# Patient Record
Sex: FEMALE | Race: WHITE | NOT HISPANIC OR LATINO | Employment: OTHER | URBAN - NONMETROPOLITAN AREA
[De-identification: names, ages, dates, MRNs, and addresses within clinical notes are randomized per-mention and may not be internally consistent; named-entity substitution may affect disease eponyms.]

---

## 2017-01-03 ENCOUNTER — APPOINTMENT (OUTPATIENT)
Dept: CT IMAGING | Facility: HOSPITAL | Age: 77
End: 2017-01-03

## 2017-01-03 ENCOUNTER — HOSPITAL ENCOUNTER (EMERGENCY)
Facility: HOSPITAL | Age: 77
Discharge: HOME | End: 2017-01-03
Attending: EMERGENCY MEDICINE | Admitting: EMERGENCY MEDICINE

## 2017-01-03 VITALS
DIASTOLIC BLOOD PRESSURE: 72 MMHG | HEIGHT: 68 IN | OXYGEN SATURATION: 96 % | HEART RATE: 72 BPM | RESPIRATION RATE: 16 BRPM | WEIGHT: 135 LBS | BODY MASS INDEX: 20.46 KG/M2 | SYSTOLIC BLOOD PRESSURE: 138 MMHG | TEMPERATURE: 97.6 F

## 2017-01-03 DIAGNOSIS — R30.0 DYSURIA: Primary | ICD-10-CM

## 2017-01-03 LAB
ALBUMIN SERPL-MCNC: 4.3 G/DL (ref 3.4–4.8)
ALBUMIN/GLOB SERPL: 1.2 G/DL (ref 1.5–2.5)
ALP SERPL-CCNC: 94 U/L (ref 46–116)
ALT SERPL W P-5'-P-CCNC: 6 U/L (ref 10–36)
ANION GAP SERPL CALCULATED.3IONS-SCNC: 8 MMOL/L (ref 3.6–11.2)
AST SERPL-CCNC: 23 U/L (ref 10–30)
BASOPHILS # BLD AUTO: 0.05 10*3/MM3 (ref 0–0.3)
BASOPHILS NFR BLD AUTO: 0.7 % (ref 0–2)
BILIRUB SERPL-MCNC: 0.3 MG/DL (ref 0.2–1.8)
BILIRUB UR QL STRIP: NEGATIVE
BUN BLD-MCNC: 13 MG/DL (ref 7–21)
BUN/CREAT SERPL: 20.3 (ref 7–25)
CALCIUM SPEC-SCNC: 9.6 MG/DL (ref 7.7–10)
CHLORIDE SERPL-SCNC: 97 MMOL/L (ref 99–112)
CLARITY UR: CLEAR
CO2 SERPL-SCNC: 27 MMOL/L (ref 24.3–31.9)
COLOR UR: YELLOW
CREAT BLD-MCNC: 0.64 MG/DL (ref 0.43–1.29)
DEPRECATED RDW RBC AUTO: 40.8 FL (ref 37–54)
EOSINOPHIL # BLD AUTO: 0.05 10*3/MM3 (ref 0–0.7)
EOSINOPHIL NFR BLD AUTO: 0.7 % (ref 0–7)
ERYTHROCYTE [DISTWIDTH] IN BLOOD BY AUTOMATED COUNT: 13.2 % (ref 11.5–14.5)
GFR SERPL CREATININE-BSD FRML MDRD: 90 ML/MIN/1.73
GLOBULIN UR ELPH-MCNC: 3.5 GM/DL
GLUCOSE BLD-MCNC: 95 MG/DL (ref 70–110)
GLUCOSE UR STRIP-MCNC: NEGATIVE MG/DL
HCT VFR BLD AUTO: 38 % (ref 37–47)
HGB BLD-MCNC: 12.4 G/DL (ref 12–16)
HGB UR QL STRIP.AUTO: NEGATIVE
IMM GRANULOCYTES # BLD: 0.01 10*3/MM3 (ref 0–0.03)
IMM GRANULOCYTES NFR BLD: 0.1 % (ref 0–0.5)
KETONES UR QL STRIP: NEGATIVE
LEUKOCYTE ESTERASE UR QL STRIP.AUTO: NEGATIVE
LYMPHOCYTES # BLD AUTO: 1.72 10*3/MM3 (ref 1–3)
LYMPHOCYTES NFR BLD AUTO: 25.5 % (ref 16–46)
MCH RBC QN AUTO: 28 PG (ref 27–33)
MCHC RBC AUTO-ENTMCNC: 32.6 G/DL (ref 33–37)
MCV RBC AUTO: 85.8 FL (ref 80–94)
MONOCYTES # BLD AUTO: 0.76 10*3/MM3 (ref 0.1–0.9)
MONOCYTES NFR BLD AUTO: 11.3 % (ref 0–12)
NEUTROPHILS # BLD AUTO: 4.15 10*3/MM3 (ref 1.4–6.5)
NEUTROPHILS NFR BLD AUTO: 61.7 % (ref 40–75)
NITRITE UR QL STRIP: NEGATIVE
OSMOLALITY SERPL CALC.SUM OF ELEC: 264.4 MOSM/KG (ref 273–305)
PH UR STRIP.AUTO: 7 [PH] (ref 5–8)
PLATELET # BLD AUTO: 211 10*3/MM3 (ref 130–400)
PMV BLD AUTO: 9.4 FL (ref 6–10)
POTASSIUM BLD-SCNC: 4.4 MMOL/L (ref 3.5–5.3)
PROT SERPL-MCNC: 7.8 G/DL (ref 6–8)
PROT UR QL STRIP: NEGATIVE
RBC # BLD AUTO: 4.43 10*6/MM3 (ref 4.2–5.4)
SODIUM BLD-SCNC: 132 MMOL/L (ref 135–153)
SP GR UR STRIP: 1.01 (ref 1–1.03)
UROBILINOGEN UR QL STRIP: NORMAL
WBC NRBC COR # BLD: 6.74 10*3/MM3 (ref 4.5–12.5)

## 2017-01-03 PROCEDURE — 81003 URINALYSIS AUTO W/O SCOPE: CPT | Performed by: PHYSICIAN ASSISTANT

## 2017-01-03 PROCEDURE — 80053 COMPREHEN METABOLIC PANEL: CPT | Performed by: PHYSICIAN ASSISTANT

## 2017-01-03 PROCEDURE — 74177 CT ABD & PELVIS W/CONTRAST: CPT

## 2017-01-03 PROCEDURE — 85025 COMPLETE CBC W/AUTO DIFF WBC: CPT | Performed by: PHYSICIAN ASSISTANT

## 2017-01-03 PROCEDURE — 99283 EMERGENCY DEPT VISIT LOW MDM: CPT

## 2017-01-03 PROCEDURE — 74177 CT ABD & PELVIS W/CONTRAST: CPT | Performed by: RADIOLOGY

## 2017-01-03 PROCEDURE — 0 IOPAMIDOL 61 % SOLUTION: Performed by: EMERGENCY MEDICINE

## 2017-01-03 RX ORDER — SODIUM CHLORIDE 0.9 % (FLUSH) 0.9 %
10 SYRINGE (ML) INJECTION AS NEEDED
Status: DISCONTINUED | OUTPATIENT
Start: 2017-01-03 | End: 2017-01-03 | Stop reason: HOSPADM

## 2017-01-03 RX ORDER — PHENAZOPYRIDINE HYDROCHLORIDE 200 MG/1
200 TABLET, FILM COATED ORAL 3 TIMES DAILY PRN
Qty: 6 TABLET | Refills: 0 | Status: SHIPPED | OUTPATIENT
Start: 2017-01-03 | End: 2019-09-21

## 2017-01-03 RX ORDER — PHENAZOPYRIDINE HYDROCHLORIDE 200 MG/1
200 TABLET, FILM COATED ORAL ONCE
Status: COMPLETED | OUTPATIENT
Start: 2017-01-03 | End: 2017-01-03

## 2017-01-03 RX ADMIN — IOPAMIDOL 100 ML: 612 INJECTION, SOLUTION INTRAVENOUS at 18:49

## 2017-01-03 RX ADMIN — PHENAZOPYRIDINE 200 MG: 200 TABLET ORAL at 19:48

## 2017-01-03 NOTE — ED PROVIDER NOTES
Subjective   Patient is a 76 y.o. female presenting with difficulty urinating.   Female Dysuria   Pain quality:  Burning  Pain severity:  Moderate  Onset quality:  Gradual  Duration:  10 days  Timing:  Intermittent  Chronicity:  New  Recent urinary tract infections: yes    Relieved by:  Nothing  Worsened by:  Nothing  Ineffective treatments:  Antibiotics  Urinary symptoms: no frequent urination and no hematuria    Associated symptoms: abdominal pain (burning in her lower abdomen earlier today.  This has resolved.)    Associated symptoms: no fever, no flank pain, no nausea and no vomiting    Risk factors: no hx of pyelonephritis, no hx of urolithiasis and no kidney transplant        Review of Systems   Constitutional: Negative.  Negative for fever.   HENT: Negative.    Respiratory: Negative.    Cardiovascular: Negative.  Negative for chest pain.   Gastrointestinal: Positive for abdominal pain (burning in her lower abdomen earlier today.  This has resolved.). Negative for nausea and vomiting.   Endocrine: Negative.    Genitourinary: Positive for difficulty urinating. Negative for dysuria and flank pain.   Skin: Negative.    Neurological: Negative.    Psychiatric/Behavioral: Negative.    All other systems reviewed and are negative.      Past Medical History   Diagnosis Date   • Disease of thyroid gland        Allergies   Allergen Reactions   • Penicillins    • Quinolones        History reviewed. No pertinent past surgical history.    History reviewed. No pertinent family history.    Social History     Social History   • Marital status:      Spouse name: N/A   • Number of children: N/A   • Years of education: N/A     Social History Main Topics   • Smoking status: Current Every Day Smoker     Types: Cigarettes   • Smokeless tobacco: None   • Alcohol use None   • Drug use: None   • Sexual activity: Not Asked     Other Topics Concern   • None     Social History Narrative   • None           Objective   Physical Exam    Constitutional: She is oriented to person, place, and time. She appears well-developed and well-nourished. No distress.   HENT:   Head: Normocephalic and atraumatic.   Right Ear: External ear normal.   Left Ear: External ear normal.   Nose: Nose normal.   Eyes: Conjunctivae and EOM are normal. Pupils are equal, round, and reactive to light.   Neck: Normal range of motion. Neck supple. No JVD present. No tracheal deviation present.   Cardiovascular: Normal rate, regular rhythm and normal heart sounds.    No murmur heard.  Pulmonary/Chest: Effort normal and breath sounds normal. No respiratory distress. She has no wheezes.   Abdominal: Soft. Bowel sounds are normal. There is no tenderness.   Musculoskeletal: Normal range of motion. She exhibits no edema or deformity.   Neurological: She is alert and oriented to person, place, and time. No cranial nerve deficit.   Skin: Skin is warm and dry. No rash noted. She is not diaphoretic. No erythema. No pallor.   Psychiatric: She has a normal mood and affect. Her behavior is normal. Thought content normal.   Nursing note and vitals reviewed.      Procedures         ED Course  ED Course                  MDM  Number of Diagnoses or Management Options  Dysuria: new and requires workup     Amount and/or Complexity of Data Reviewed  Clinical lab tests: ordered and reviewed  Tests in the radiology section of CPT®: ordered and reviewed  Discuss the patient with other providers: yes  Independent visualization of images, tracings, or specimens: yes    Risk of Complications, Morbidity, and/or Mortality  Presenting problems: moderate        Final diagnoses:   Dysuria            MAURA Velasco  01/03/17 1931

## 2017-01-04 NOTE — DISCHARGE INSTRUCTIONS

## 2019-07-16 ENCOUNTER — TRANSCRIBE ORDERS (OUTPATIENT)
Dept: ADMINISTRATIVE | Facility: HOSPITAL | Age: 79
End: 2019-07-16

## 2019-07-16 DIAGNOSIS — G89.29 CHRONIC NONINTRACTABLE HEADACHE, UNSPECIFIED HEADACHE TYPE: Primary | ICD-10-CM

## 2019-07-16 DIAGNOSIS — R51.9 CHRONIC NONINTRACTABLE HEADACHE, UNSPECIFIED HEADACHE TYPE: Primary | ICD-10-CM

## 2019-07-26 ENCOUNTER — HOSPITAL ENCOUNTER (OUTPATIENT)
Dept: MRI IMAGING | Facility: HOSPITAL | Age: 79
Discharge: HOME | End: 2019-07-26
Admitting: FAMILY MEDICINE

## 2019-07-26 DIAGNOSIS — R51.9 CHRONIC NONINTRACTABLE HEADACHE, UNSPECIFIED HEADACHE TYPE: ICD-10-CM

## 2019-07-26 DIAGNOSIS — G89.29 CHRONIC NONINTRACTABLE HEADACHE, UNSPECIFIED HEADACHE TYPE: ICD-10-CM

## 2019-07-26 PROCEDURE — 70551 MRI BRAIN STEM W/O DYE: CPT

## 2019-07-26 PROCEDURE — 70551 MRI BRAIN STEM W/O DYE: CPT | Performed by: RADIOLOGY

## 2019-09-21 ENCOUNTER — APPOINTMENT (OUTPATIENT)
Dept: GENERAL RADIOLOGY | Facility: HOSPITAL | Age: 79
End: 2019-09-21

## 2019-09-21 ENCOUNTER — APPOINTMENT (OUTPATIENT)
Dept: CT IMAGING | Facility: HOSPITAL | Age: 79
End: 2019-09-21

## 2019-09-21 ENCOUNTER — HOSPITAL ENCOUNTER (INPATIENT)
Facility: HOSPITAL | Age: 79
LOS: 5 days | Discharge: HOME | End: 2019-09-26
Attending: FAMILY MEDICINE | Admitting: INTERNAL MEDICINE

## 2019-09-21 DIAGNOSIS — E87.1 HYPONATREMIA: Primary | ICD-10-CM

## 2019-09-21 DIAGNOSIS — I16.0 HYPERTENSIVE URGENCY: ICD-10-CM

## 2019-09-21 LAB
ALBUMIN SERPL-MCNC: 4.3 G/DL (ref 3.5–5.2)
ALBUMIN/GLOB SERPL: 1.3 G/DL
ALP SERPL-CCNC: 90 U/L (ref 39–117)
ALT SERPL W P-5'-P-CCNC: 7 U/L (ref 1–33)
ANION GAP SERPL CALCULATED.3IONS-SCNC: 9.5 MMOL/L (ref 5–15)
AST SERPL-CCNC: 20 U/L (ref 1–32)
BASOPHILS # BLD AUTO: 0.03 10*3/MM3 (ref 0–0.2)
BASOPHILS NFR BLD AUTO: 0.8 % (ref 0–1.5)
BILIRUB SERPL-MCNC: 0.2 MG/DL (ref 0.2–1.2)
BILIRUB UR QL STRIP: NEGATIVE
BUN BLD-MCNC: 15 MG/DL (ref 8–23)
BUN/CREAT SERPL: 20.8 (ref 7–25)
CALCIUM SPEC-SCNC: 9.5 MG/DL (ref 8.6–10.5)
CHLORIDE SERPL-SCNC: 81 MMOL/L (ref 98–107)
CHLORIDE UR-SCNC: 42 MMOL/L
CK SERPL-CCNC: 74 U/L (ref 20–180)
CLARITY UR: CLEAR
CO2 SERPL-SCNC: 26.5 MMOL/L (ref 22–29)
COLOR UR: YELLOW
CREAT BLD-MCNC: 0.72 MG/DL (ref 0.57–1)
CRP SERPL-MCNC: 0.22 MG/DL (ref 0–0.5)
DEPRECATED RDW RBC AUTO: 41.9 FL (ref 37–54)
EOSINOPHIL # BLD AUTO: 0.03 10*3/MM3 (ref 0–0.4)
EOSINOPHIL NFR BLD AUTO: 0.8 % (ref 0.3–6.2)
ERYTHROCYTE [DISTWIDTH] IN BLOOD BY AUTOMATED COUNT: 14.9 % (ref 12.3–15.4)
GFR SERPL CREATININE-BSD FRML MDRD: 78 ML/MIN/1.73
GLOBULIN UR ELPH-MCNC: 3.2 GM/DL
GLUCOSE BLD-MCNC: 107 MG/DL (ref 65–99)
GLUCOSE UR STRIP-MCNC: NEGATIVE MG/DL
HCT VFR BLD AUTO: 30.4 % (ref 34–46.6)
HGB BLD-MCNC: 9.8 G/DL (ref 12–15.9)
HGB UR QL STRIP.AUTO: NEGATIVE
IMM GRANULOCYTES # BLD AUTO: 0 10*3/MM3 (ref 0–0.05)
IMM GRANULOCYTES NFR BLD AUTO: 0 % (ref 0–0.5)
KETONES UR QL STRIP: NEGATIVE
LEUKOCYTE ESTERASE UR QL STRIP.AUTO: NEGATIVE
LYMPHOCYTES # BLD AUTO: 1.2 10*3/MM3 (ref 0.7–3.1)
LYMPHOCYTES NFR BLD AUTO: 30.2 % (ref 19.6–45.3)
MAGNESIUM SERPL-MCNC: 1.7 MG/DL (ref 1.6–2.4)
MCH RBC QN AUTO: 25.3 PG (ref 26.6–33)
MCHC RBC AUTO-ENTMCNC: 32.2 G/DL (ref 31.5–35.7)
MCV RBC AUTO: 78.6 FL (ref 79–97)
MONOCYTES # BLD AUTO: 0.46 10*3/MM3 (ref 0.1–0.9)
MONOCYTES NFR BLD AUTO: 11.6 % (ref 5–12)
NEUTROPHILS # BLD AUTO: 2.26 10*3/MM3 (ref 1.7–7)
NEUTROPHILS NFR BLD AUTO: 56.6 % (ref 42.7–76)
NITRITE UR QL STRIP: NEGATIVE
NT-PROBNP SERPL-MCNC: 999.7 PG/ML (ref 5–1800)
PH UR STRIP.AUTO: 7.5 [PH] (ref 5–8)
PLATELET # BLD AUTO: 232 10*3/MM3 (ref 140–450)
PMV BLD AUTO: 8.4 FL (ref 6–12)
POTASSIUM BLD-SCNC: 4.6 MMOL/L (ref 3.5–5.2)
PROT SERPL-MCNC: 7.5 G/DL (ref 6–8.5)
PROT UR QL STRIP: NEGATIVE
RBC # BLD AUTO: 3.87 10*6/MM3 (ref 3.77–5.28)
SODIUM BLD-SCNC: 117 MMOL/L (ref 136–145)
SODIUM UR-SCNC: 58 MMOL/L
SP GR UR STRIP: 1.01 (ref 1–1.03)
T4 FREE SERPL-MCNC: 1.68 NG/DL (ref 0.93–1.7)
TROPONIN T SERPL-MCNC: <0.01 NG/ML (ref 0–0.03)
TROPONIN T SERPL-MCNC: <0.01 NG/ML (ref 0–0.03)
TSH SERPL DL<=0.05 MIU/L-ACNC: 0.19 UIU/ML (ref 0.27–4.2)
UROBILINOGEN UR QL STRIP: NORMAL
WBC NRBC COR # BLD: 3.98 10*3/MM3 (ref 3.4–10.8)

## 2019-09-21 PROCEDURE — 84481 FREE ASSAY (FT-3): CPT | Performed by: HOSPITALIST

## 2019-09-21 PROCEDURE — 84484 ASSAY OF TROPONIN QUANT: CPT | Performed by: FAMILY MEDICINE

## 2019-09-21 PROCEDURE — 83880 ASSAY OF NATRIURETIC PEPTIDE: CPT | Performed by: FAMILY MEDICINE

## 2019-09-21 PROCEDURE — 82550 ASSAY OF CK (CPK): CPT | Performed by: FAMILY MEDICINE

## 2019-09-21 PROCEDURE — 93005 ELECTROCARDIOGRAM TRACING: CPT | Performed by: FAMILY MEDICINE

## 2019-09-21 PROCEDURE — 84439 ASSAY OF FREE THYROXINE: CPT | Performed by: HOSPITALIST

## 2019-09-21 PROCEDURE — 84300 ASSAY OF URINE SODIUM: CPT | Performed by: HOSPITALIST

## 2019-09-21 PROCEDURE — 99284 EMERGENCY DEPT VISIT MOD MDM: CPT

## 2019-09-21 PROCEDURE — 83735 ASSAY OF MAGNESIUM: CPT | Performed by: FAMILY MEDICINE

## 2019-09-21 PROCEDURE — 25010000002 INFLUENZA VAC SUBUNIT QUAD 0.5 ML SUSPENSION PREFILLED SYRINGE: Performed by: HOSPITALIST

## 2019-09-21 PROCEDURE — 82436 ASSAY OF URINE CHLORIDE: CPT | Performed by: HOSPITALIST

## 2019-09-21 PROCEDURE — 90674 CCIIV4 VAC NO PRSV 0.5 ML IM: CPT | Performed by: HOSPITALIST

## 2019-09-21 PROCEDURE — 84443 ASSAY THYROID STIM HORMONE: CPT | Performed by: FAMILY MEDICINE

## 2019-09-21 PROCEDURE — 71045 X-RAY EXAM CHEST 1 VIEW: CPT

## 2019-09-21 PROCEDURE — 82570 ASSAY OF URINE CREATININE: CPT | Performed by: INTERNAL MEDICINE

## 2019-09-21 PROCEDURE — 70450 CT HEAD/BRAIN W/O DYE: CPT

## 2019-09-21 PROCEDURE — G0008 ADMIN INFLUENZA VIRUS VAC: HCPCS | Performed by: HOSPITALIST

## 2019-09-21 PROCEDURE — 80053 COMPREHEN METABOLIC PANEL: CPT | Performed by: FAMILY MEDICINE

## 2019-09-21 PROCEDURE — 25010000002 HYDRALAZINE PER 20 MG

## 2019-09-21 PROCEDURE — 25010000002 HEPARIN (PORCINE) PER 1000 UNITS: Performed by: INTERNAL MEDICINE

## 2019-09-21 PROCEDURE — 99223 1ST HOSP IP/OBS HIGH 75: CPT | Performed by: HOSPITALIST

## 2019-09-21 PROCEDURE — 81003 URINALYSIS AUTO W/O SCOPE: CPT | Performed by: HOSPITALIST

## 2019-09-21 PROCEDURE — 85025 COMPLETE CBC W/AUTO DIFF WBC: CPT | Performed by: FAMILY MEDICINE

## 2019-09-21 PROCEDURE — 84484 ASSAY OF TROPONIN QUANT: CPT | Performed by: INTERNAL MEDICINE

## 2019-09-21 PROCEDURE — 86140 C-REACTIVE PROTEIN: CPT | Performed by: FAMILY MEDICINE

## 2019-09-21 RX ORDER — HEPARIN SODIUM 5000 [USP'U]/ML
5000 INJECTION, SOLUTION INTRAVENOUS; SUBCUTANEOUS EVERY 8 HOURS SCHEDULED
Status: DISCONTINUED | OUTPATIENT
Start: 2019-09-21 | End: 2019-09-26 | Stop reason: HOSPADM

## 2019-09-21 RX ORDER — POTASSIUM CHLORIDE 1.5 G/1.77G
40 POWDER, FOR SOLUTION ORAL AS NEEDED
Status: DISCONTINUED | OUTPATIENT
Start: 2019-09-21 | End: 2019-09-26 | Stop reason: HOSPADM

## 2019-09-21 RX ORDER — HYDRALAZINE HYDROCHLORIDE 20 MG/ML
INJECTION INTRAMUSCULAR; INTRAVENOUS
Status: COMPLETED
Start: 2019-09-21 | End: 2019-09-21

## 2019-09-21 RX ORDER — MAGNESIUM SULFATE HEPTAHYDRATE 40 MG/ML
2 INJECTION, SOLUTION INTRAVENOUS AS NEEDED
Status: DISCONTINUED | OUTPATIENT
Start: 2019-09-21 | End: 2019-09-26 | Stop reason: HOSPADM

## 2019-09-21 RX ORDER — MAGNESIUM SULFATE HEPTAHYDRATE 40 MG/ML
4 INJECTION, SOLUTION INTRAVENOUS AS NEEDED
Status: DISCONTINUED | OUTPATIENT
Start: 2019-09-21 | End: 2019-09-26 | Stop reason: HOSPADM

## 2019-09-21 RX ORDER — PHENAZOPYRIDINE HYDROCHLORIDE 100 MG/1
100 TABLET, FILM COATED ORAL 3 TIMES DAILY
Status: CANCELLED | OUTPATIENT
Start: 2019-09-21

## 2019-09-21 RX ORDER — LEVOTHYROXINE SODIUM 88 UG/1
88 TABLET ORAL DAILY
COMMUNITY
End: 2019-09-21

## 2019-09-21 RX ORDER — METOPROLOL SUCCINATE 25 MG/1
25 TABLET, EXTENDED RELEASE ORAL DAILY
COMMUNITY
End: 2022-08-12 | Stop reason: HOSPADM

## 2019-09-21 RX ORDER — POTASSIUM CHLORIDE 7.45 MG/ML
10 INJECTION INTRAVENOUS
Status: DISCONTINUED | OUTPATIENT
Start: 2019-09-21 | End: 2019-09-26 | Stop reason: HOSPADM

## 2019-09-21 RX ORDER — ASPIRIN 81 MG/1
81 TABLET ORAL DAILY
COMMUNITY
End: 2022-06-17

## 2019-09-21 RX ORDER — HYDROCODONE BITARTRATE AND ACETAMINOPHEN 10; 325 MG/1; MG/1
1 TABLET ORAL EVERY 8 HOURS PRN
COMMUNITY

## 2019-09-21 RX ORDER — SODIUM CHLORIDE 0.9 % (FLUSH) 0.9 %
10 SYRINGE (ML) INJECTION AS NEEDED
Status: DISCONTINUED | OUTPATIENT
Start: 2019-09-21 | End: 2019-09-26 | Stop reason: HOSPADM

## 2019-09-21 RX ORDER — LABETALOL HYDROCHLORIDE 5 MG/ML
10 INJECTION, SOLUTION INTRAVENOUS ONCE
Status: COMPLETED | OUTPATIENT
Start: 2019-09-21 | End: 2019-09-21

## 2019-09-21 RX ORDER — HYDRALAZINE HYDROCHLORIDE 20 MG/ML
20 INJECTION INTRAMUSCULAR; INTRAVENOUS ONCE
Status: COMPLETED | OUTPATIENT
Start: 2019-09-21 | End: 2019-09-21

## 2019-09-21 RX ORDER — ERGOCALCIFEROL (VITAMIN D2) 10 MCG
400 TABLET ORAL DAILY
COMMUNITY
End: 2022-06-17

## 2019-09-21 RX ORDER — AMLODIPINE BESYLATE 5 MG/1
5 TABLET ORAL DAILY
Status: ON HOLD | COMMUNITY
End: 2019-09-26 | Stop reason: SDUPTHER

## 2019-09-21 RX ORDER — LEVOTHYROXINE SODIUM 88 UG/1
88 TABLET ORAL DAILY
COMMUNITY
End: 2019-09-26 | Stop reason: HOSPADM

## 2019-09-21 RX ORDER — MAGNESIUM SULFATE HEPTAHYDRATE 40 MG/ML
4 INJECTION, SOLUTION INTRAVENOUS ONCE
Status: COMPLETED | OUTPATIENT
Start: 2019-09-21 | End: 2019-09-22

## 2019-09-21 RX ORDER — SODIUM CHLORIDE 0.9 % (FLUSH) 0.9 %
10 SYRINGE (ML) INJECTION EVERY 12 HOURS SCHEDULED
Status: DISCONTINUED | OUTPATIENT
Start: 2019-09-21 | End: 2019-09-26 | Stop reason: HOSPADM

## 2019-09-21 RX ORDER — LISINOPRIL AND HYDROCHLOROTHIAZIDE 20; 12.5 MG/1; MG/1
1 TABLET ORAL DAILY
COMMUNITY
End: 2019-09-26 | Stop reason: HOSPADM

## 2019-09-21 RX ORDER — HYDRALAZINE HYDROCHLORIDE 20 MG/ML
10 INJECTION INTRAMUSCULAR; INTRAVENOUS EVERY 6 HOURS PRN
Status: DISCONTINUED | OUTPATIENT
Start: 2019-09-21 | End: 2019-09-26 | Stop reason: HOSPADM

## 2019-09-21 RX ORDER — ISOSORBIDE DINITRATE 30 MG/1
30 TABLET ORAL 2 TIMES DAILY
COMMUNITY
End: 2023-01-11 | Stop reason: SDUPTHER

## 2019-09-21 RX ORDER — SODIUM CHLORIDE 9 MG/ML
75 INJECTION, SOLUTION INTRAVENOUS CONTINUOUS
Status: DISCONTINUED | OUTPATIENT
Start: 2019-09-21 | End: 2019-09-22

## 2019-09-21 RX ORDER — POTASSIUM CHLORIDE 750 MG/1
40 CAPSULE, EXTENDED RELEASE ORAL AS NEEDED
Status: DISCONTINUED | OUTPATIENT
Start: 2019-09-21 | End: 2019-09-26 | Stop reason: HOSPADM

## 2019-09-21 RX ORDER — ALPRAZOLAM 1 MG/1
0.5 TABLET ORAL NIGHTLY
COMMUNITY

## 2019-09-21 RX ADMIN — LABETALOL HYDROCHLORIDE 10 MG: 5 INJECTION INTRAVENOUS at 17:20

## 2019-09-21 RX ADMIN — HYDRALAZINE HYDROCHLORIDE 20 MG: 20 INJECTION INTRAMUSCULAR; INTRAVENOUS at 19:23

## 2019-09-21 RX ADMIN — INFLUENZA A VIRUS A/SINGAPORE/GP1908/2015 IVR-180 (H1N1) ANTIGEN (MDCK CELL DERIVED, PROPIOLACTONE INACTIVATED), INFLUENZA A VIRUS A/NORTH CAROLINA/04/2016 (H3N2) HEMAGGLUTININ ANTIGEN (MDCK CELL DERIVED, PROPIOLACTONE INACTIVATED), INFLUENZA B VIRUS B/IOWA/06/2017 HEMAGGLUTININ ANTIGEN (MDCK CELL DERIVED, PROPIOLACTONE INACTIVATED), INFLUENZA B VIRUS B/SINGAPORE/INFTT-16-0610/2016 HEMAGGLUTININ ANTIGEN (MDCK CELL DERIVED, PROPIOLACTONE INACTIVATED) 0.5 ML: 15; 15; 15; 15 INJECTION, SUSPENSION INTRAMUSCULAR at 21:38

## 2019-09-21 RX ADMIN — MAGNESIUM SULFATE HEPTAHYDRATE 4 G: 40 INJECTION, SOLUTION INTRAVENOUS at 21:28

## 2019-09-21 RX ADMIN — SODIUM CHLORIDE, PRESERVATIVE FREE 10 ML: 5 INJECTION INTRAVENOUS at 21:28

## 2019-09-21 RX ADMIN — HEPARIN SODIUM 5000 UNITS: 5000 INJECTION INTRAVENOUS; SUBCUTANEOUS at 21:38

## 2019-09-21 RX ADMIN — SODIUM CHLORIDE 75 ML/HR: 9 INJECTION, SOLUTION INTRAVENOUS at 21:28

## 2019-09-22 ENCOUNTER — APPOINTMENT (OUTPATIENT)
Dept: ULTRASOUND IMAGING | Facility: HOSPITAL | Age: 79
End: 2019-09-22

## 2019-09-22 ENCOUNTER — APPOINTMENT (OUTPATIENT)
Dept: CARDIOLOGY | Facility: HOSPITAL | Age: 79
End: 2019-09-22

## 2019-09-22 ENCOUNTER — APPOINTMENT (OUTPATIENT)
Dept: CT IMAGING | Facility: HOSPITAL | Age: 79
End: 2019-09-22

## 2019-09-22 LAB
25(OH)D3 SERPL-MCNC: 37.5 NG/ML (ref 30–100)
ALBUMIN SERPL-MCNC: 4 G/DL (ref 3.5–5.2)
ALBUMIN/GLOB SERPL: 1.2 G/DL
ALP SERPL-CCNC: 86 U/L (ref 39–117)
ALT SERPL W P-5'-P-CCNC: 7 U/L (ref 1–33)
ANION GAP SERPL CALCULATED.3IONS-SCNC: 12.9 MMOL/L (ref 5–15)
ANION GAP SERPL CALCULATED.3IONS-SCNC: 14.5 MMOL/L (ref 5–15)
ANION GAP SERPL CALCULATED.3IONS-SCNC: 16 MMOL/L (ref 5–15)
ANION GAP SERPL CALCULATED.3IONS-SCNC: 17 MMOL/L (ref 5–15)
AST SERPL-CCNC: 21 U/L (ref 1–32)
BASOPHILS # BLD AUTO: 0.02 10*3/MM3 (ref 0–0.2)
BASOPHILS NFR BLD AUTO: 0.3 % (ref 0–1.5)
BH CV ECHO MEAS - ACS: 1.2 CM
BH CV ECHO MEAS - AO MAX PG: 2.9 MMHG
BH CV ECHO MEAS - AO MEAN PG: 1.6 MMHG
BH CV ECHO MEAS - AO ROOT AREA (BSA CORRECTED): 2
BH CV ECHO MEAS - AO ROOT AREA: 9.4 CM^2
BH CV ECHO MEAS - AO ROOT DIAM: 3.5 CM
BH CV ECHO MEAS - AO V2 MAX: 85.5 CM/SEC
BH CV ECHO MEAS - AO V2 MEAN: 59.4 CM/SEC
BH CV ECHO MEAS - AO V2 VTI: 19.3 CM
BH CV ECHO MEAS - BSA(HAYCOCK): 1.7 M^2
BH CV ECHO MEAS - BSA: 1.7 M^2
BH CV ECHO MEAS - BZI_BMI: 19.8 KILOGRAMS/M^2
BH CV ECHO MEAS - BZI_METRIC_HEIGHT: 172.7 CM
BH CV ECHO MEAS - BZI_METRIC_WEIGHT: 59 KG
BH CV ECHO MEAS - EDV(CUBED): 28.9 ML
BH CV ECHO MEAS - EDV(MOD-SP4): 28 ML
BH CV ECHO MEAS - EDV(TEICH): 37 ML
BH CV ECHO MEAS - EF(CUBED): 77.7 %
BH CV ECHO MEAS - EF(MOD-SP4): 64.3 %
BH CV ECHO MEAS - EF(TEICH): 71.4 %
BH CV ECHO MEAS - ESV(CUBED): 6.5 ML
BH CV ECHO MEAS - ESV(MOD-SP4): 10 ML
BH CV ECHO MEAS - ESV(TEICH): 10.6 ML
BH CV ECHO MEAS - FS: 39.4 %
BH CV ECHO MEAS - IVS/LVPW: 1.1
BH CV ECHO MEAS - IVSD: 1.5 CM
BH CV ECHO MEAS - LA DIMENSION: 2.6 CM
BH CV ECHO MEAS - LA/AO: 0.74
BH CV ECHO MEAS - LV DIASTOLIC VOL/BSA (35-75): 16.5 ML/M^2
BH CV ECHO MEAS - LV MASS(C)D: 148.1 GRAMS
BH CV ECHO MEAS - LV MASS(C)DI: 87 GRAMS/M^2
BH CV ECHO MEAS - LV SYSTOLIC VOL/BSA (12-30): 5.9 ML/M^2
BH CV ECHO MEAS - LVIDD: 3.1 CM
BH CV ECHO MEAS - LVIDS: 1.9 CM
BH CV ECHO MEAS - LVLD AP4: 5.5 CM
BH CV ECHO MEAS - LVLS AP4: 4.8 CM
BH CV ECHO MEAS - LVOT AREA (M): 3.1 CM^2
BH CV ECHO MEAS - LVOT AREA: 3.1 CM^2
BH CV ECHO MEAS - LVOT DIAM: 2 CM
BH CV ECHO MEAS - LVPWD: 1.4 CM
BH CV ECHO MEAS - MV A MAX VEL: 83.9 CM/SEC
BH CV ECHO MEAS - MV E MAX VEL: 38.5 CM/SEC
BH CV ECHO MEAS - MV E/A: 0.46
BH CV ECHO MEAS - PA ACC SLOPE: 1068 CM/SEC^2
BH CV ECHO MEAS - PA ACC TIME: 0.09 SEC
BH CV ECHO MEAS - PA PR(ACCEL): 37.8 MMHG
BH CV ECHO MEAS - RAP SYSTOLE: 10 MMHG
BH CV ECHO MEAS - RVSP: 32.3 MMHG
BH CV ECHO MEAS - SI(AO): 106.1 ML/M^2
BH CV ECHO MEAS - SI(CUBED): 13.2 ML/M^2
BH CV ECHO MEAS - SI(MOD-SP4): 10.6 ML/M^2
BH CV ECHO MEAS - SI(TEICH): 15.5 ML/M^2
BH CV ECHO MEAS - SV(AO): 180.7 ML
BH CV ECHO MEAS - SV(CUBED): 22.5 ML
BH CV ECHO MEAS - SV(MOD-SP4): 18 ML
BH CV ECHO MEAS - SV(TEICH): 26.4 ML
BH CV ECHO MEAS - TR MAX VEL: 236.2 CM/SEC
BILIRUB SERPL-MCNC: 0.3 MG/DL (ref 0.2–1.2)
BUN BLD-MCNC: 10 MG/DL (ref 8–23)
BUN BLD-MCNC: 12 MG/DL (ref 8–23)
BUN BLD-MCNC: 15 MG/DL (ref 8–23)
BUN BLD-MCNC: 15 MG/DL (ref 8–23)
BUN/CREAT SERPL: 17.9 (ref 7–25)
BUN/CREAT SERPL: 19.7 (ref 7–25)
BUN/CREAT SERPL: 22.4 (ref 7–25)
BUN/CREAT SERPL: 22.7 (ref 7–25)
CALCIUM SPEC-SCNC: 8.8 MG/DL (ref 8.6–10.5)
CALCIUM SPEC-SCNC: 9 MG/DL (ref 8.6–10.5)
CALCIUM SPEC-SCNC: 9.1 MG/DL (ref 8.6–10.5)
CALCIUM SPEC-SCNC: 9.2 MG/DL (ref 8.6–10.5)
CHLORIDE SERPL-SCNC: 80 MMOL/L (ref 98–107)
CHLORIDE SERPL-SCNC: 81 MMOL/L (ref 98–107)
CHLORIDE SERPL-SCNC: 83 MMOL/L (ref 98–107)
CHLORIDE SERPL-SCNC: 83 MMOL/L (ref 98–107)
CO2 SERPL-SCNC: 20 MMOL/L (ref 22–29)
CO2 SERPL-SCNC: 21 MMOL/L (ref 22–29)
CO2 SERPL-SCNC: 22.1 MMOL/L (ref 22–29)
CO2 SERPL-SCNC: 22.5 MMOL/L (ref 22–29)
CORTIS SERPL-MCNC: 17.54 MCG/DL
CREAT BLD-MCNC: 0.56 MG/DL (ref 0.57–1)
CREAT BLD-MCNC: 0.61 MG/DL (ref 0.57–1)
CREAT BLD-MCNC: 0.66 MG/DL (ref 0.57–1)
CREAT BLD-MCNC: 0.67 MG/DL (ref 0.57–1)
CREAT UR-MCNC: 14.3 MG/DL
DEPRECATED RDW RBC AUTO: 41.6 FL (ref 37–54)
EOSINOPHIL # BLD AUTO: 0.02 10*3/MM3 (ref 0–0.4)
EOSINOPHIL NFR BLD AUTO: 0.3 % (ref 0.3–6.2)
ERYTHROCYTE [DISTWIDTH] IN BLOOD BY AUTOMATED COUNT: 14.9 % (ref 12.3–15.4)
FERRITIN SERPL-MCNC: 16.71 NG/ML (ref 13–150)
FOLATE SERPL-MCNC: 11 NG/ML (ref 4.78–24.2)
GFR SERPL CREATININE-BSD FRML MDRD: 104 ML/MIN/1.73
GFR SERPL CREATININE-BSD FRML MDRD: 85 ML/MIN/1.73
GFR SERPL CREATININE-BSD FRML MDRD: 86 ML/MIN/1.73
GFR SERPL CREATININE-BSD FRML MDRD: 94 ML/MIN/1.73
GLOBULIN UR ELPH-MCNC: 3.3 GM/DL
GLUCOSE BLD-MCNC: 116 MG/DL (ref 65–99)
GLUCOSE BLD-MCNC: 129 MG/DL (ref 65–99)
GLUCOSE BLD-MCNC: 130 MG/DL (ref 65–99)
GLUCOSE BLD-MCNC: 138 MG/DL (ref 65–99)
HCT VFR BLD AUTO: 30.9 % (ref 34–46.6)
HGB BLD-MCNC: 10 G/DL (ref 12–15.9)
IMM GRANULOCYTES # BLD AUTO: 0.02 10*3/MM3 (ref 0–0.05)
IMM GRANULOCYTES NFR BLD AUTO: 0.3 % (ref 0–0.5)
IRON 24H UR-MRATE: 32 MCG/DL (ref 37–145)
IRON SATN MFR SERPL: 9 % (ref 20–50)
LYMPHOCYTES # BLD AUTO: 1.43 10*3/MM3 (ref 0.7–3.1)
LYMPHOCYTES NFR BLD AUTO: 22.2 % (ref 19.6–45.3)
MAGNESIUM SERPL-MCNC: 3.1 MG/DL (ref 1.6–2.4)
MAXIMAL PREDICTED HEART RATE: 140 BPM
MCH RBC QN AUTO: 25.2 PG (ref 26.6–33)
MCHC RBC AUTO-ENTMCNC: 32.4 G/DL (ref 31.5–35.7)
MCV RBC AUTO: 77.8 FL (ref 79–97)
MONOCYTES # BLD AUTO: 0.66 10*3/MM3 (ref 0.1–0.9)
MONOCYTES NFR BLD AUTO: 10.3 % (ref 5–12)
NEUTROPHILS # BLD AUTO: 4.28 10*3/MM3 (ref 1.7–7)
NEUTROPHILS NFR BLD AUTO: 66.6 % (ref 42.7–76)
PHOSPHATE SERPL-MCNC: 3 MG/DL (ref 2.5–4.5)
PLATELET # BLD AUTO: 253 10*3/MM3 (ref 140–450)
PMV BLD AUTO: 8.7 FL (ref 6–12)
POTASSIUM BLD-SCNC: 4.1 MMOL/L (ref 3.5–5.2)
POTASSIUM BLD-SCNC: 4.2 MMOL/L (ref 3.5–5.2)
POTASSIUM BLD-SCNC: 4.2 MMOL/L (ref 3.5–5.2)
PROT SERPL-MCNC: 7.3 G/DL (ref 6–8.5)
RBC # BLD AUTO: 3.97 10*6/MM3 (ref 3.77–5.28)
SODIUM BLD-SCNC: 116 MMOL/L (ref 136–145)
SODIUM BLD-SCNC: 118 MMOL/L (ref 136–145)
SODIUM BLD-SCNC: 119 MMOL/L (ref 136–145)
SODIUM BLD-SCNC: 120 MMOL/L (ref 136–145)
STRESS TARGET HR: 119 BPM
T3FREE SERPL-MCNC: 1.96 PG/ML (ref 2–4.4)
TIBC SERPL-MCNC: 361 MCG/DL (ref 298–536)
TRANSFERRIN SERPL-MCNC: 242 MG/DL (ref 200–360)
TROPONIN T SERPL-MCNC: <0.01 NG/ML (ref 0–0.03)
TROPONIN T SERPL-MCNC: <0.01 NG/ML (ref 0–0.03)
TSH SERPL DL<=0.05 MIU/L-ACNC: 0.13 UIU/ML (ref 0.27–4.2)
VIT B12 BLD-MCNC: 226 PG/ML (ref 211–946)
WBC NRBC COR # BLD: 6.43 10*3/MM3 (ref 3.4–10.8)

## 2019-09-22 PROCEDURE — 76536 US EXAM OF HEAD AND NECK: CPT

## 2019-09-22 PROCEDURE — 84443 ASSAY THYROID STIM HORMONE: CPT | Performed by: INTERNAL MEDICINE

## 2019-09-22 PROCEDURE — 82533 TOTAL CORTISOL: CPT | Performed by: INTERNAL MEDICINE

## 2019-09-22 PROCEDURE — 82607 VITAMIN B-12: CPT | Performed by: INTERNAL MEDICINE

## 2019-09-22 PROCEDURE — 85025 COMPLETE CBC W/AUTO DIFF WBC: CPT | Performed by: INTERNAL MEDICINE

## 2019-09-22 PROCEDURE — 71260 CT THORAX DX C+: CPT | Performed by: RADIOLOGY

## 2019-09-22 PROCEDURE — 84484 ASSAY OF TROPONIN QUANT: CPT | Performed by: INTERNAL MEDICINE

## 2019-09-22 PROCEDURE — 80053 COMPREHEN METABOLIC PANEL: CPT | Performed by: INTERNAL MEDICINE

## 2019-09-22 PROCEDURE — 25010000002 HEPARIN (PORCINE) PER 1000 UNITS: Performed by: INTERNAL MEDICINE

## 2019-09-22 PROCEDURE — 93306 TTE W/DOPPLER COMPLETE: CPT

## 2019-09-22 PROCEDURE — 84132 ASSAY OF SERUM POTASSIUM: CPT | Performed by: INTERNAL MEDICINE

## 2019-09-22 PROCEDURE — 93880 EXTRACRANIAL BILAT STUDY: CPT

## 2019-09-22 PROCEDURE — 82746 ASSAY OF FOLIC ACID SERUM: CPT | Performed by: INTERNAL MEDICINE

## 2019-09-22 PROCEDURE — 71260 CT THORAX DX C+: CPT

## 2019-09-22 PROCEDURE — 93880 EXTRACRANIAL BILAT STUDY: CPT | Performed by: RADIOLOGY

## 2019-09-22 PROCEDURE — 82728 ASSAY OF FERRITIN: CPT | Performed by: INTERNAL MEDICINE

## 2019-09-22 PROCEDURE — 84466 ASSAY OF TRANSFERRIN: CPT | Performed by: INTERNAL MEDICINE

## 2019-09-22 PROCEDURE — 83540 ASSAY OF IRON: CPT | Performed by: INTERNAL MEDICINE

## 2019-09-22 PROCEDURE — 94799 UNLISTED PULMONARY SVC/PX: CPT

## 2019-09-22 PROCEDURE — 99233 SBSQ HOSP IP/OBS HIGH 50: CPT | Performed by: INTERNAL MEDICINE

## 2019-09-22 PROCEDURE — 76536 US EXAM OF HEAD AND NECK: CPT | Performed by: RADIOLOGY

## 2019-09-22 PROCEDURE — 82306 VITAMIN D 25 HYDROXY: CPT | Performed by: INTERNAL MEDICINE

## 2019-09-22 PROCEDURE — 84100 ASSAY OF PHOSPHORUS: CPT | Performed by: INTERNAL MEDICINE

## 2019-09-22 PROCEDURE — 0 IOVERSOL 68 % SOLUTION: Performed by: INTERNAL MEDICINE

## 2019-09-22 PROCEDURE — 83735 ASSAY OF MAGNESIUM: CPT | Performed by: INTERNAL MEDICINE

## 2019-09-22 RX ORDER — AMLODIPINE BESYLATE 5 MG/1
5 TABLET ORAL DAILY
Status: DISCONTINUED | OUTPATIENT
Start: 2019-09-22 | End: 2019-09-26 | Stop reason: HOSPADM

## 2019-09-22 RX ORDER — OMEGA-3S/DHA/EPA/FISH OIL/D3 300MG-1000
400 CAPSULE ORAL DAILY
Status: DISCONTINUED | OUTPATIENT
Start: 2019-09-22 | End: 2019-09-26 | Stop reason: HOSPADM

## 2019-09-22 RX ORDER — LEVOTHYROXINE SODIUM 0.07 MG/1
75 TABLET ORAL DAILY
Status: DISCONTINUED | OUTPATIENT
Start: 2019-09-22 | End: 2019-09-26 | Stop reason: HOSPADM

## 2019-09-22 RX ORDER — 3% SODIUM CHLORIDE 3 G/100ML
25 INJECTION, SOLUTION INTRAVENOUS CONTINUOUS
Status: DISCONTINUED | OUTPATIENT
Start: 2019-09-22 | End: 2019-09-23

## 2019-09-22 RX ORDER — ASPIRIN 81 MG/1
81 TABLET ORAL DAILY
Status: DISCONTINUED | OUTPATIENT
Start: 2019-09-22 | End: 2019-09-26 | Stop reason: HOSPADM

## 2019-09-22 RX ORDER — ACETAMINOPHEN 325 MG/1
650 TABLET ORAL EVERY 6 HOURS PRN
Status: DISCONTINUED | OUTPATIENT
Start: 2019-09-22 | End: 2019-09-26 | Stop reason: HOSPADM

## 2019-09-22 RX ORDER — METOPROLOL SUCCINATE 25 MG/1
25 TABLET, EXTENDED RELEASE ORAL DAILY
Status: DISCONTINUED | OUTPATIENT
Start: 2019-09-22 | End: 2019-09-26 | Stop reason: HOSPADM

## 2019-09-22 RX ORDER — DEXTROSE MONOHYDRATE 50 MG/ML
6 INJECTION, SOLUTION INTRAVENOUS CONTINUOUS PRN
Status: DISCONTINUED | OUTPATIENT
Start: 2019-09-22 | End: 2019-09-26 | Stop reason: HOSPADM

## 2019-09-22 RX ORDER — ALPRAZOLAM 1 MG/1
1 TABLET ORAL NIGHTLY PRN
Status: DISCONTINUED | OUTPATIENT
Start: 2019-09-22 | End: 2019-09-26 | Stop reason: HOSPADM

## 2019-09-22 RX ORDER — FERROUS SULFATE 325(65) MG
325 TABLET ORAL 2 TIMES DAILY WITH MEALS
Status: DISCONTINUED | OUTPATIENT
Start: 2019-09-22 | End: 2019-09-26 | Stop reason: HOSPADM

## 2019-09-22 RX ORDER — 3% SODIUM CHLORIDE 3 G/100ML
25 INJECTION, SOLUTION INTRAVENOUS CONTINUOUS
Status: DISPENSED | OUTPATIENT
Start: 2019-09-22 | End: 2019-09-22

## 2019-09-22 RX ORDER — HYDROCODONE BITARTRATE AND ACETAMINOPHEN 10; 325 MG/1; MG/1
1 TABLET ORAL EVERY 8 HOURS PRN
Status: DISCONTINUED | OUTPATIENT
Start: 2019-09-22 | End: 2019-09-26 | Stop reason: HOSPADM

## 2019-09-22 RX ADMIN — CHOLECALCIFEROL TAB 10 MCG (400 UNIT) 400 UNITS: 10 TAB at 14:02

## 2019-09-22 RX ADMIN — SODIUM CHLORIDE, PRESERVATIVE FREE 10 ML: 5 INJECTION INTRAVENOUS at 20:22

## 2019-09-22 RX ADMIN — ISOSORBIDE DINITRATE 30 MG: 20 TABLET ORAL at 14:04

## 2019-09-22 RX ADMIN — IOVERSOL 100 ML: 678 INJECTION INTRA-ARTERIAL; INTRAVENOUS at 17:30

## 2019-09-22 RX ADMIN — SODIUM CHLORIDE 25 ML/HR: 3 INJECTION, SOLUTION INTRAVENOUS at 20:20

## 2019-09-22 RX ADMIN — HEPARIN SODIUM 5000 UNITS: 5000 INJECTION INTRAVENOUS; SUBCUTANEOUS at 14:03

## 2019-09-22 RX ADMIN — ALPRAZOLAM 1 MG: 1 TABLET ORAL at 21:09

## 2019-09-22 RX ADMIN — HYDROCODONE BITARTRATE AND ACETAMINOPHEN 1 TABLET: 10; 325 TABLET ORAL at 21:09

## 2019-09-22 RX ADMIN — METOPROLOL SUCCINATE 25 MG: 25 TABLET, EXTENDED RELEASE ORAL at 14:04

## 2019-09-22 RX ADMIN — ISOSORBIDE DINITRATE 30 MG: 20 TABLET ORAL at 17:11

## 2019-09-22 RX ADMIN — HEPARIN SODIUM 5000 UNITS: 5000 INJECTION INTRAVENOUS; SUBCUTANEOUS at 09:39

## 2019-09-22 RX ADMIN — SODIUM CHLORIDE, PRESERVATIVE FREE 10 ML: 5 INJECTION INTRAVENOUS at 08:16

## 2019-09-22 RX ADMIN — SODIUM CHLORIDE 25 ML/HR: 3 INJECTION, SOLUTION INTRAVENOUS at 02:59

## 2019-09-22 RX ADMIN — ACETAMINOPHEN 650 MG: 325 TABLET ORAL at 09:39

## 2019-09-22 RX ADMIN — ASPIRIN 81 MG: 81 TABLET, COATED ORAL at 14:02

## 2019-09-22 RX ADMIN — FERROUS SULFATE TAB 325 MG (65 MG ELEMENTAL FE) 325 MG: 325 (65 FE) TAB at 17:11

## 2019-09-22 RX ADMIN — AMLODIPINE BESYLATE 5 MG: 5 TABLET ORAL at 14:02

## 2019-09-22 RX ADMIN — LEVOTHYROXINE SODIUM 75 MCG: 75 TABLET ORAL at 14:04

## 2019-09-23 LAB
ALBUMIN SERPL-MCNC: 3.65 G/DL (ref 3.5–5.2)
ALBUMIN/GLOB SERPL: 1.2 G/DL
ALP SERPL-CCNC: 79 U/L (ref 39–117)
ALT SERPL W P-5'-P-CCNC: 9 U/L (ref 1–33)
ANION GAP SERPL CALCULATED.3IONS-SCNC: 13.2 MMOL/L (ref 5–15)
AST SERPL-CCNC: 26 U/L (ref 1–32)
BASOPHILS # BLD AUTO: 0.02 10*3/MM3 (ref 0–0.2)
BASOPHILS NFR BLD AUTO: 0.4 % (ref 0–1.5)
BILIRUB SERPL-MCNC: 0.2 MG/DL (ref 0.2–1.2)
BUN BLD-MCNC: 17 MG/DL (ref 8–23)
BUN/CREAT SERPL: 25.8 (ref 7–25)
CALCIUM SPEC-SCNC: 8.9 MG/DL (ref 8.6–10.5)
CHLORIDE SERPL-SCNC: 86 MMOL/L (ref 98–107)
CO2 SERPL-SCNC: 20.8 MMOL/L (ref 22–29)
CREAT BLD-MCNC: 0.66 MG/DL (ref 0.57–1)
DEPRECATED RDW RBC AUTO: 42.3 FL (ref 37–54)
EOSINOPHIL # BLD AUTO: 0.04 10*3/MM3 (ref 0–0.4)
EOSINOPHIL NFR BLD AUTO: 0.7 % (ref 0.3–6.2)
ERYTHROCYTE [DISTWIDTH] IN BLOOD BY AUTOMATED COUNT: 15.4 % (ref 12.3–15.4)
GFR SERPL CREATININE-BSD FRML MDRD: 86 ML/MIN/1.73
GLOBULIN UR ELPH-MCNC: 3 GM/DL
GLUCOSE BLD-MCNC: 102 MG/DL (ref 65–99)
HCT VFR BLD AUTO: 27.8 % (ref 34–46.6)
HGB BLD-MCNC: 9 G/DL (ref 12–15.9)
IMM GRANULOCYTES # BLD AUTO: 0.02 10*3/MM3 (ref 0–0.05)
IMM GRANULOCYTES NFR BLD AUTO: 0.4 % (ref 0–0.5)
LYMPHOCYTES # BLD AUTO: 1.6 10*3/MM3 (ref 0.7–3.1)
LYMPHOCYTES NFR BLD AUTO: 28.2 % (ref 19.6–45.3)
MAGNESIUM SERPL-MCNC: 2.2 MG/DL (ref 1.6–2.4)
MCH RBC QN AUTO: 25.4 PG (ref 26.6–33)
MCHC RBC AUTO-ENTMCNC: 32.4 G/DL (ref 31.5–35.7)
MCV RBC AUTO: 78.3 FL (ref 79–97)
MONOCYTES # BLD AUTO: 0.73 10*3/MM3 (ref 0.1–0.9)
MONOCYTES NFR BLD AUTO: 12.9 % (ref 5–12)
NEUTROPHILS # BLD AUTO: 3.26 10*3/MM3 (ref 1.7–7)
NEUTROPHILS NFR BLD AUTO: 57.4 % (ref 42.7–76)
PLATELET # BLD AUTO: 237 10*3/MM3 (ref 140–450)
PMV BLD AUTO: 9.2 FL (ref 6–12)
POTASSIUM BLD-SCNC: 3.8 MMOL/L (ref 3.5–5.2)
POTASSIUM BLD-SCNC: 3.9 MMOL/L (ref 3.5–5.2)
POTASSIUM BLD-SCNC: 3.9 MMOL/L (ref 3.5–5.2)
POTASSIUM BLD-SCNC: 4 MMOL/L (ref 3.5–5.2)
POTASSIUM BLD-SCNC: 4 MMOL/L (ref 3.5–5.2)
POTASSIUM BLD-SCNC: 4.6 MMOL/L (ref 3.5–5.2)
PROT SERPL-MCNC: 6.6 G/DL (ref 6–8.5)
RBC # BLD AUTO: 3.55 10*6/MM3 (ref 3.77–5.28)
SODIUM BLD-SCNC: 119 MMOL/L (ref 136–145)
SODIUM BLD-SCNC: 120 MMOL/L (ref 136–145)
SODIUM BLD-SCNC: 122 MMOL/L (ref 136–145)
SODIUM BLD-SCNC: 123 MMOL/L (ref 136–145)
SODIUM BLD-SCNC: 123 MMOL/L (ref 136–145)
SODIUM BLD-SCNC: 125 MMOL/L (ref 136–145)
WBC NRBC COR # BLD: 5.67 10*3/MM3 (ref 3.4–10.8)

## 2019-09-23 PROCEDURE — 86235 NUCLEAR ANTIGEN ANTIBODY: CPT | Performed by: INTERNAL MEDICINE

## 2019-09-23 PROCEDURE — 85025 COMPLETE CBC W/AUTO DIFF WBC: CPT | Performed by: INTERNAL MEDICINE

## 2019-09-23 PROCEDURE — 84132 ASSAY OF SERUM POTASSIUM: CPT | Performed by: INTERNAL MEDICINE

## 2019-09-23 PROCEDURE — 80053 COMPREHEN METABOLIC PANEL: CPT | Performed by: INTERNAL MEDICINE

## 2019-09-23 PROCEDURE — 87305 ASPERGILLUS AG IA: CPT | Performed by: INTERNAL MEDICINE

## 2019-09-23 PROCEDURE — 86256 FLUORESCENT ANTIBODY TITER: CPT | Performed by: INTERNAL MEDICINE

## 2019-09-23 PROCEDURE — 87385 HISTOPLASMA CAPSUL AG IA: CPT | Performed by: INTERNAL MEDICINE

## 2019-09-23 PROCEDURE — 83735 ASSAY OF MAGNESIUM: CPT | Performed by: INTERNAL MEDICINE

## 2019-09-23 PROCEDURE — 86635 COCCIDIOIDES ANTIBODY: CPT | Performed by: INTERNAL MEDICINE

## 2019-09-23 PROCEDURE — 86225 DNA ANTIBODY NATIVE: CPT | Performed by: INTERNAL MEDICINE

## 2019-09-23 PROCEDURE — 87449 NOS EACH ORGANISM AG IA: CPT | Performed by: INTERNAL MEDICINE

## 2019-09-23 PROCEDURE — 83520 IMMUNOASSAY QUANT NOS NONAB: CPT | Performed by: INTERNAL MEDICINE

## 2019-09-23 PROCEDURE — 84295 ASSAY OF SERUM SODIUM: CPT | Performed by: INTERNAL MEDICINE

## 2019-09-23 PROCEDURE — 25010000002 HEPARIN (PORCINE) PER 1000 UNITS: Performed by: INTERNAL MEDICINE

## 2019-09-23 PROCEDURE — 99223 1ST HOSP IP/OBS HIGH 75: CPT | Performed by: INTERNAL MEDICINE

## 2019-09-23 PROCEDURE — 83935 ASSAY OF URINE OSMOLALITY: CPT | Performed by: INTERNAL MEDICINE

## 2019-09-23 PROCEDURE — 99233 SBSQ HOSP IP/OBS HIGH 50: CPT | Performed by: INTERNAL MEDICINE

## 2019-09-23 PROCEDURE — 86038 ANTINUCLEAR ANTIBODIES: CPT | Performed by: INTERNAL MEDICINE

## 2019-09-23 PROCEDURE — 86480 TB TEST CELL IMMUN MEASURE: CPT | Performed by: INTERNAL MEDICINE

## 2019-09-23 RX ORDER — ECHINACEA PURPUREA EXTRACT 125 MG
2 TABLET ORAL AS NEEDED
Status: DISCONTINUED | OUTPATIENT
Start: 2019-09-23 | End: 2019-09-26 | Stop reason: HOSPADM

## 2019-09-23 RX ADMIN — HEPARIN SODIUM 5000 UNITS: 5000 INJECTION INTRAVENOUS; SUBCUTANEOUS at 22:05

## 2019-09-23 RX ADMIN — SODIUM CHLORIDE, PRESERVATIVE FREE 10 ML: 5 INJECTION INTRAVENOUS at 08:24

## 2019-09-23 RX ADMIN — ALPRAZOLAM 1 MG: 1 TABLET ORAL at 22:08

## 2019-09-23 RX ADMIN — HYDROCODONE BITARTRATE AND ACETAMINOPHEN 1 TABLET: 10; 325 TABLET ORAL at 15:38

## 2019-09-23 RX ADMIN — FERROUS SULFATE TAB 325 MG (65 MG ELEMENTAL FE) 325 MG: 325 (65 FE) TAB at 08:20

## 2019-09-23 RX ADMIN — HEPARIN SODIUM 5000 UNITS: 5000 INJECTION INTRAVENOUS; SUBCUTANEOUS at 15:32

## 2019-09-23 RX ADMIN — CHOLECALCIFEROL TAB 10 MCG (400 UNIT) 400 UNITS: 10 TAB at 08:22

## 2019-09-23 RX ADMIN — ISOSORBIDE DINITRATE 30 MG: 20 TABLET ORAL at 18:26

## 2019-09-23 RX ADMIN — ASPIRIN 81 MG: 81 TABLET, COATED ORAL at 08:22

## 2019-09-23 RX ADMIN — HEPARIN SODIUM 5000 UNITS: 5000 INJECTION INTRAVENOUS; SUBCUTANEOUS at 00:35

## 2019-09-23 RX ADMIN — FERROUS SULFATE TAB 325 MG (65 MG ELEMENTAL FE) 325 MG: 325 (65 FE) TAB at 18:27

## 2019-09-23 RX ADMIN — LEVOTHYROXINE SODIUM 75 MCG: 75 TABLET ORAL at 08:20

## 2019-09-23 RX ADMIN — HEPARIN SODIUM 5000 UNITS: 5000 INJECTION INTRAVENOUS; SUBCUTANEOUS at 06:42

## 2019-09-23 RX ADMIN — SODIUM CHLORIDE, PRESERVATIVE FREE 10 ML: 5 INJECTION INTRAVENOUS at 22:08

## 2019-09-24 LAB
ALBUMIN SERPL-MCNC: 3.65 G/DL (ref 3.5–5.2)
ALBUMIN/GLOB SERPL: 1.2 G/DL
ALP SERPL-CCNC: 74 U/L (ref 39–117)
ALT SERPL W P-5'-P-CCNC: 9 U/L (ref 1–33)
ANION GAP SERPL CALCULATED.3IONS-SCNC: 12.4 MMOL/L (ref 5–15)
AST SERPL-CCNC: 27 U/L (ref 1–32)
BASOPHILS # BLD AUTO: 0.03 10*3/MM3 (ref 0–0.2)
BASOPHILS NFR BLD AUTO: 0.6 % (ref 0–1.5)
BILIRUB SERPL-MCNC: <0.2 MG/DL (ref 0.2–1.2)
BUN BLD-MCNC: 14 MG/DL (ref 8–23)
BUN/CREAT SERPL: 21.5 (ref 7–25)
CALCIUM SPEC-SCNC: 9 MG/DL (ref 8.6–10.5)
CHLORIDE SERPL-SCNC: 92 MMOL/L (ref 98–107)
CO2 SERPL-SCNC: 22.6 MMOL/L (ref 22–29)
CREAT BLD-MCNC: 0.65 MG/DL (ref 0.57–1)
DEPRECATED RDW RBC AUTO: 44.5 FL (ref 37–54)
EOSINOPHIL # BLD AUTO: 0.07 10*3/MM3 (ref 0–0.4)
EOSINOPHIL NFR BLD AUTO: 1.3 % (ref 0.3–6.2)
ERYTHROCYTE [DISTWIDTH] IN BLOOD BY AUTOMATED COUNT: 15.9 % (ref 12.3–15.4)
GFR SERPL CREATININE-BSD FRML MDRD: 88 ML/MIN/1.73
GLOBULIN UR ELPH-MCNC: 3 GM/DL
GLUCOSE BLD-MCNC: 91 MG/DL (ref 65–99)
HCT VFR BLD AUTO: 28.9 % (ref 34–46.6)
HGB BLD-MCNC: 9.2 G/DL (ref 12–15.9)
IMM GRANULOCYTES # BLD AUTO: 0.01 10*3/MM3 (ref 0–0.05)
IMM GRANULOCYTES NFR BLD AUTO: 0.2 % (ref 0–0.5)
LYMPHOCYTES # BLD AUTO: 1.47 10*3/MM3 (ref 0.7–3.1)
LYMPHOCYTES NFR BLD AUTO: 28.2 % (ref 19.6–45.3)
MAGNESIUM SERPL-MCNC: 2.2 MG/DL (ref 1.6–2.4)
MCH RBC QN AUTO: 25.4 PG (ref 26.6–33)
MCHC RBC AUTO-ENTMCNC: 31.8 G/DL (ref 31.5–35.7)
MCV RBC AUTO: 79.8 FL (ref 79–97)
MONOCYTES # BLD AUTO: 0.69 10*3/MM3 (ref 0.1–0.9)
MONOCYTES NFR BLD AUTO: 13.2 % (ref 5–12)
NEUTROPHILS # BLD AUTO: 2.95 10*3/MM3 (ref 1.7–7)
NEUTROPHILS NFR BLD AUTO: 56.5 % (ref 42.7–76)
OSMOLALITY UR: 354 MOSM/KG
OSMOLALITY UR: 394 MOSM/KG
PLATELET # BLD AUTO: 231 10*3/MM3 (ref 140–450)
PMV BLD AUTO: 9.2 FL (ref 6–12)
POTASSIUM BLD-SCNC: 4 MMOL/L (ref 3.5–5.2)
POTASSIUM BLD-SCNC: 4.2 MMOL/L (ref 3.5–5.2)
POTASSIUM BLD-SCNC: 4.4 MMOL/L (ref 3.5–5.2)
POTASSIUM BLD-SCNC: 4.4 MMOL/L (ref 3.5–5.2)
POTASSIUM BLD-SCNC: 4.5 MMOL/L (ref 3.5–5.2)
POTASSIUM BLD-SCNC: 4.5 MMOL/L (ref 3.5–5.2)
POTASSIUM BLD-SCNC: 4.8 MMOL/L (ref 3.5–5.2)
PROT SERPL-MCNC: 6.6 G/DL (ref 6–8.5)
RBC # BLD AUTO: 3.62 10*6/MM3 (ref 3.77–5.28)
SODIUM BLD-SCNC: 122 MMOL/L (ref 136–145)
SODIUM BLD-SCNC: 127 MMOL/L (ref 136–145)
SODIUM BLD-SCNC: 128 MMOL/L (ref 136–145)
SODIUM BLD-SCNC: 129 MMOL/L (ref 136–145)
WBC NRBC COR # BLD: 5.22 10*3/MM3 (ref 3.4–10.8)

## 2019-09-24 PROCEDURE — 84132 ASSAY OF SERUM POTASSIUM: CPT | Performed by: INTERNAL MEDICINE

## 2019-09-24 PROCEDURE — 25010000002 HEPARIN (PORCINE) PER 1000 UNITS: Performed by: INTERNAL MEDICINE

## 2019-09-24 PROCEDURE — 83935 ASSAY OF URINE OSMOLALITY: CPT | Performed by: INTERNAL MEDICINE

## 2019-09-24 PROCEDURE — 99233 SBSQ HOSP IP/OBS HIGH 50: CPT | Performed by: INTERNAL MEDICINE

## 2019-09-24 PROCEDURE — 80053 COMPREHEN METABOLIC PANEL: CPT | Performed by: INTERNAL MEDICINE

## 2019-09-24 PROCEDURE — 85025 COMPLETE CBC W/AUTO DIFF WBC: CPT | Performed by: INTERNAL MEDICINE

## 2019-09-24 PROCEDURE — 99231 SBSQ HOSP IP/OBS SF/LOW 25: CPT | Performed by: INTERNAL MEDICINE

## 2019-09-24 PROCEDURE — 83735 ASSAY OF MAGNESIUM: CPT | Performed by: INTERNAL MEDICINE

## 2019-09-24 PROCEDURE — 84295 ASSAY OF SERUM SODIUM: CPT | Performed by: INTERNAL MEDICINE

## 2019-09-24 RX ADMIN — ISOSORBIDE DINITRATE 30 MG: 20 TABLET ORAL at 08:32

## 2019-09-24 RX ADMIN — HEPARIN SODIUM 5000 UNITS: 5000 INJECTION INTRAVENOUS; SUBCUTANEOUS at 14:51

## 2019-09-24 RX ADMIN — METOPROLOL SUCCINATE 25 MG: 25 TABLET, EXTENDED RELEASE ORAL at 08:31

## 2019-09-24 RX ADMIN — HYDROCODONE BITARTRATE AND ACETAMINOPHEN 1 TABLET: 10; 325 TABLET ORAL at 18:08

## 2019-09-24 RX ADMIN — HYDROCODONE BITARTRATE AND ACETAMINOPHEN 1 TABLET: 10; 325 TABLET ORAL at 02:01

## 2019-09-24 RX ADMIN — AMLODIPINE BESYLATE 5 MG: 5 TABLET ORAL at 08:31

## 2019-09-24 RX ADMIN — ISOSORBIDE DINITRATE 30 MG: 20 TABLET ORAL at 18:09

## 2019-09-24 RX ADMIN — SODIUM CHLORIDE, PRESERVATIVE FREE 10 ML: 5 INJECTION INTRAVENOUS at 08:33

## 2019-09-24 RX ADMIN — HEPARIN SODIUM 5000 UNITS: 5000 INJECTION INTRAVENOUS; SUBCUTANEOUS at 21:45

## 2019-09-24 RX ADMIN — ASPIRIN 81 MG: 81 TABLET, COATED ORAL at 08:32

## 2019-09-24 RX ADMIN — CHOLECALCIFEROL TAB 10 MCG (400 UNIT) 400 UNITS: 10 TAB at 08:31

## 2019-09-24 RX ADMIN — FERROUS SULFATE TAB 325 MG (65 MG ELEMENTAL FE) 325 MG: 325 (65 FE) TAB at 18:09

## 2019-09-24 RX ADMIN — LEVOTHYROXINE SODIUM 75 MCG: 75 TABLET ORAL at 08:32

## 2019-09-24 RX ADMIN — FERROUS SULFATE TAB 325 MG (65 MG ELEMENTAL FE) 325 MG: 325 (65 FE) TAB at 08:31

## 2019-09-24 RX ADMIN — ALPRAZOLAM 1 MG: 1 TABLET ORAL at 21:45

## 2019-09-24 RX ADMIN — HEPARIN SODIUM 5000 UNITS: 5000 INJECTION INTRAVENOUS; SUBCUTANEOUS at 06:19

## 2019-09-24 RX ADMIN — SODIUM CHLORIDE, PRESERVATIVE FREE 10 ML: 5 INJECTION INTRAVENOUS at 21:45

## 2019-09-25 LAB
ANA SER QL IA: POSITIVE
ANA SPECKLED TITR SER: ABNORMAL {TITER}
ANION GAP SERPL CALCULATED.3IONS-SCNC: 13.3 MMOL/L (ref 5–15)
BUN BLD-MCNC: 18 MG/DL (ref 8–23)
BUN/CREAT SERPL: 26.1 (ref 7–25)
CALCIUM SPEC-SCNC: 9 MG/DL (ref 8.6–10.5)
CENTROMERE B AB SER-ACNC: <0.2 AI (ref 0–0.9)
CHLORIDE SERPL-SCNC: 95 MMOL/L (ref 98–107)
CHROMATIN AB SERPL-ACNC: 0.2 AI (ref 0–0.9)
CO2 SERPL-SCNC: 21.7 MMOL/L (ref 22–29)
CREAT BLD-MCNC: 0.69 MG/DL (ref 0.57–1)
DSDNA AB SER-ACNC: 1 IU/ML (ref 0–9)
ENA JO1 AB SER-ACNC: <0.2 AI (ref 0–0.9)
ENA RNP AB SER-ACNC: <0.2 AI (ref 0–0.9)
ENA SCL70 AB SER-ACNC: <0.2 AI (ref 0–0.9)
ENA SM AB SER-ACNC: <0.2 AI (ref 0–0.9)
ENA SS-A AB SER-ACNC: >8 AI (ref 0–0.9)
ENA SS-B AB SER-ACNC: <0.2 AI (ref 0–0.9)
GALACTOMANNAN AG SPEC IA-ACNC: 0.03 INDEX (ref 0–0.49)
GFR SERPL CREATININE-BSD FRML MDRD: 82 ML/MIN/1.73
GLUCOSE BLD-MCNC: 103 MG/DL (ref 65–99)
H CAPSUL AG SER IA-MCNC: <0.5 NG/ML
Lab: ABNORMAL
Lab: NORMAL
NUCLEAR DOT PATTERN: ABNORMAL
POTASSIUM BLD-SCNC: 4.1 MMOL/L (ref 3.5–5.2)
POTASSIUM BLD-SCNC: 4.2 MMOL/L (ref 3.5–5.2)
POTASSIUM BLD-SCNC: 4.2 MMOL/L (ref 3.5–5.2)
POTASSIUM BLD-SCNC: 4.4 MMOL/L (ref 3.5–5.2)
POTASSIUM BLD-SCNC: 4.5 MMOL/L (ref 3.5–5.2)
POTASSIUM BLD-SCNC: 4.7 MMOL/L (ref 3.5–5.2)
POTASSIUM BLD-SCNC: 4.9 MMOL/L (ref 3.5–5.2)
RESULT: 58 PG/ML
SODIUM BLD-SCNC: 128 MMOL/L (ref 136–145)
SODIUM BLD-SCNC: 128 MMOL/L (ref 136–145)
SODIUM BLD-SCNC: 129 MMOL/L (ref 136–145)
SODIUM BLD-SCNC: 130 MMOL/L (ref 136–145)

## 2019-09-25 PROCEDURE — 25010000002 HEPARIN (PORCINE) PER 1000 UNITS: Performed by: INTERNAL MEDICINE

## 2019-09-25 PROCEDURE — 84295 ASSAY OF SERUM SODIUM: CPT | Performed by: INTERNAL MEDICINE

## 2019-09-25 PROCEDURE — 84132 ASSAY OF SERUM POTASSIUM: CPT | Performed by: INTERNAL MEDICINE

## 2019-09-25 PROCEDURE — 80048 BASIC METABOLIC PNL TOTAL CA: CPT | Performed by: INTERNAL MEDICINE

## 2019-09-25 PROCEDURE — 99232 SBSQ HOSP IP/OBS MODERATE 35: CPT | Performed by: INTERNAL MEDICINE

## 2019-09-25 PROCEDURE — 99231 SBSQ HOSP IP/OBS SF/LOW 25: CPT | Performed by: INTERNAL MEDICINE

## 2019-09-25 RX ADMIN — LEVOTHYROXINE SODIUM 75 MCG: 75 TABLET ORAL at 08:30

## 2019-09-25 RX ADMIN — FERROUS SULFATE TAB 325 MG (65 MG ELEMENTAL FE) 325 MG: 325 (65 FE) TAB at 08:30

## 2019-09-25 RX ADMIN — FERROUS SULFATE TAB 325 MG (65 MG ELEMENTAL FE) 325 MG: 325 (65 FE) TAB at 17:30

## 2019-09-25 RX ADMIN — ISOSORBIDE DINITRATE 30 MG: 20 TABLET ORAL at 08:30

## 2019-09-25 RX ADMIN — SODIUM CHLORIDE, PRESERVATIVE FREE 10 ML: 5 INJECTION INTRAVENOUS at 08:30

## 2019-09-25 RX ADMIN — ASPIRIN 81 MG: 81 TABLET, COATED ORAL at 08:30

## 2019-09-25 RX ADMIN — ISOSORBIDE DINITRATE 30 MG: 20 TABLET ORAL at 17:30

## 2019-09-25 RX ADMIN — METOPROLOL SUCCINATE 25 MG: 25 TABLET, EXTENDED RELEASE ORAL at 08:29

## 2019-09-25 RX ADMIN — HEPARIN SODIUM 5000 UNITS: 5000 INJECTION INTRAVENOUS; SUBCUTANEOUS at 06:24

## 2019-09-25 RX ADMIN — HEPARIN SODIUM 5000 UNITS: 5000 INJECTION INTRAVENOUS; SUBCUTANEOUS at 22:40

## 2019-09-25 RX ADMIN — HYDROCODONE BITARTRATE AND ACETAMINOPHEN 1 TABLET: 10; 325 TABLET ORAL at 15:16

## 2019-09-25 RX ADMIN — CHOLECALCIFEROL TAB 10 MCG (400 UNIT) 400 UNITS: 10 TAB at 08:30

## 2019-09-25 RX ADMIN — SODIUM CHLORIDE, PRESERVATIVE FREE 10 ML: 5 INJECTION INTRAVENOUS at 22:40

## 2019-09-25 RX ADMIN — AMLODIPINE BESYLATE 5 MG: 5 TABLET ORAL at 08:30

## 2019-09-25 RX ADMIN — HEPARIN SODIUM 5000 UNITS: 5000 INJECTION INTRAVENOUS; SUBCUTANEOUS at 14:10

## 2019-09-26 VITALS
RESPIRATION RATE: 15 BRPM | HEIGHT: 68 IN | WEIGHT: 127.19 LBS | SYSTOLIC BLOOD PRESSURE: 122 MMHG | TEMPERATURE: 97.5 F | BODY MASS INDEX: 19.28 KG/M2 | DIASTOLIC BLOOD PRESSURE: 62 MMHG | OXYGEN SATURATION: 98 % | HEART RATE: 66 BPM

## 2019-09-26 PROBLEM — E87.1 HYPONATREMIA: Status: RESOLVED | Noted: 2019-09-21 | Resolved: 2019-09-26

## 2019-09-26 LAB
ALBUMIN SERPL-MCNC: 3.65 G/DL (ref 3.5–5.2)
ALBUMIN/GLOB SERPL: 1.2 G/DL
ALP SERPL-CCNC: 76 U/L (ref 39–117)
ALT SERPL W P-5'-P-CCNC: 10 U/L (ref 1–33)
ANION GAP SERPL CALCULATED.3IONS-SCNC: 10.8 MMOL/L (ref 5–15)
AST SERPL-CCNC: 21 U/L (ref 1–32)
BASOPHILS # BLD AUTO: 0.04 10*3/MM3 (ref 0–0.2)
BASOPHILS NFR BLD AUTO: 0.7 % (ref 0–1.5)
BILIRUB SERPL-MCNC: <0.2 MG/DL (ref 0.2–1.2)
BUN BLD-MCNC: 16 MG/DL (ref 8–23)
BUN/CREAT SERPL: 23.2 (ref 7–25)
C-ANCA TITR SER IF: ABNORMAL TITER
CALCIUM SPEC-SCNC: 8.9 MG/DL (ref 8.6–10.5)
CHLORIDE SERPL-SCNC: 95 MMOL/L (ref 98–107)
CO2 SERPL-SCNC: 24.2 MMOL/L (ref 22–29)
CREAT BLD-MCNC: 0.69 MG/DL (ref 0.57–1)
DEPRECATED RDW RBC AUTO: 48.9 FL (ref 37–54)
EOSINOPHIL # BLD AUTO: 0.1 10*3/MM3 (ref 0–0.4)
EOSINOPHIL NFR BLD AUTO: 1.7 % (ref 0.3–6.2)
ERYTHROCYTE [DISTWIDTH] IN BLOOD BY AUTOMATED COUNT: 16.8 % (ref 12.3–15.4)
GFR SERPL CREATININE-BSD FRML MDRD: 82 ML/MIN/1.73
GLOBULIN UR ELPH-MCNC: 3.2 GM/DL
GLUCOSE BLD-MCNC: 97 MG/DL (ref 65–99)
HCT VFR BLD AUTO: 30 % (ref 34–46.6)
HGB BLD-MCNC: 9 G/DL (ref 12–15.9)
IMM GRANULOCYTES # BLD AUTO: 0.02 10*3/MM3 (ref 0–0.05)
IMM GRANULOCYTES NFR BLD AUTO: 0.3 % (ref 0–0.5)
LYMPHOCYTES # BLD AUTO: 1.8 10*3/MM3 (ref 0.7–3.1)
LYMPHOCYTES NFR BLD AUTO: 30.5 % (ref 19.6–45.3)
MCH RBC QN AUTO: 24.7 PG (ref 26.6–33)
MCHC RBC AUTO-ENTMCNC: 30 G/DL (ref 31.5–35.7)
MCV RBC AUTO: 82.2 FL (ref 79–97)
MONOCYTES # BLD AUTO: 0.83 10*3/MM3 (ref 0.1–0.9)
MONOCYTES NFR BLD AUTO: 14.1 % (ref 5–12)
MYELOPEROXIDASE AB SER-ACNC: <9 U/ML (ref 0–9)
NEUTROPHILS # BLD AUTO: 3.11 10*3/MM3 (ref 1.7–7)
NEUTROPHILS NFR BLD AUTO: 52.7 % (ref 42.7–76)
P-ANCA ATYPICAL TITR SER IF: ABNORMAL TITER
P-ANCA TITR SER IF: ABNORMAL TITER
PLATELET # BLD AUTO: 227 10*3/MM3 (ref 140–450)
PMV BLD AUTO: 8.8 FL (ref 6–12)
POTASSIUM BLD-SCNC: 4.3 MMOL/L (ref 3.5–5.2)
POTASSIUM BLD-SCNC: 4.4 MMOL/L (ref 3.5–5.2)
POTASSIUM BLD-SCNC: 4.4 MMOL/L (ref 3.5–5.2)
POTASSIUM BLD-SCNC: 4.6 MMOL/L (ref 3.5–5.2)
PROT SERPL-MCNC: 6.8 G/DL (ref 6–8.5)
PROTEINASE3 AB SER IA-ACNC: <3.5 U/ML (ref 0–3.5)
QUANTIFERON CRITERIA: NORMAL
QUANTIFERON MITOGEN VALUE: >10 IU/ML
QUANTIFERON NIL VALUE: 0.02 IU/ML
QUANTIFERON TB1 AG VALUE: 0.09 IU/ML
QUANTIFERON TB2 AG VALUE: 0.07 IU/ML
QUANTIFERON-TB GOLD PLUS: NEGATIVE
RBC # BLD AUTO: 3.65 10*6/MM3 (ref 3.77–5.28)
SODIUM BLD-SCNC: 130 MMOL/L (ref 136–145)
SODIUM BLD-SCNC: 130 MMOL/L (ref 136–145)
SODIUM BLD-SCNC: 132 MMOL/L (ref 136–145)
URATE SERPL-MCNC: 3.2 MG/DL (ref 2.4–5.7)
WBC NRBC COR # BLD: 5.9 10*3/MM3 (ref 3.4–10.8)

## 2019-09-26 PROCEDURE — 84295 ASSAY OF SERUM SODIUM: CPT | Performed by: INTERNAL MEDICINE

## 2019-09-26 PROCEDURE — 85025 COMPLETE CBC W/AUTO DIFF WBC: CPT | Performed by: INTERNAL MEDICINE

## 2019-09-26 PROCEDURE — 99239 HOSP IP/OBS DSCHRG MGMT >30: CPT | Performed by: INTERNAL MEDICINE

## 2019-09-26 PROCEDURE — 99231 SBSQ HOSP IP/OBS SF/LOW 25: CPT | Performed by: INTERNAL MEDICINE

## 2019-09-26 PROCEDURE — 80053 COMPREHEN METABOLIC PANEL: CPT | Performed by: INTERNAL MEDICINE

## 2019-09-26 PROCEDURE — 84550 ASSAY OF BLOOD/URIC ACID: CPT | Performed by: INTERNAL MEDICINE

## 2019-09-26 PROCEDURE — 84132 ASSAY OF SERUM POTASSIUM: CPT | Performed by: INTERNAL MEDICINE

## 2019-09-26 RX ORDER — LEVOTHYROXINE SODIUM 0.07 MG/1
75 TABLET ORAL DAILY
Qty: 30 TABLET | Refills: 0 | Status: SHIPPED | OUTPATIENT
Start: 2019-09-27

## 2019-09-26 RX ORDER — AMLODIPINE BESYLATE 5 MG/1
2.5 TABLET ORAL DAILY
Qty: 30 TABLET | Refills: 0 | Status: ON HOLD | OUTPATIENT
Start: 2019-09-26 | End: 2022-08-09

## 2019-09-26 RX ORDER — FERROUS SULFATE 325(65) MG
325 TABLET ORAL 2 TIMES DAILY WITH MEALS
Qty: 60 TABLET | Refills: 0 | Status: SHIPPED | OUTPATIENT
Start: 2019-09-26 | End: 2022-06-17

## 2019-09-26 RX ADMIN — METOPROLOL SUCCINATE 25 MG: 25 TABLET, EXTENDED RELEASE ORAL at 08:05

## 2019-09-26 RX ADMIN — SODIUM CHLORIDE, PRESERVATIVE FREE 10 ML: 5 INJECTION INTRAVENOUS at 08:08

## 2019-09-26 RX ADMIN — LEVOTHYROXINE SODIUM 75 MCG: 75 TABLET ORAL at 08:06

## 2019-09-26 RX ADMIN — CHOLECALCIFEROL TAB 10 MCG (400 UNIT) 400 UNITS: 10 TAB at 08:06

## 2019-09-26 RX ADMIN — ISOSORBIDE DINITRATE 30 MG: 20 TABLET ORAL at 08:05

## 2019-09-26 RX ADMIN — FERROUS SULFATE TAB 325 MG (65 MG ELEMENTAL FE) 325 MG: 325 (65 FE) TAB at 08:06

## 2019-09-26 RX ADMIN — ALPRAZOLAM 1 MG: 1 TABLET ORAL at 02:14

## 2019-09-26 RX ADMIN — HYDROCODONE BITARTRATE AND ACETAMINOPHEN 1 TABLET: 10; 325 TABLET ORAL at 02:13

## 2019-09-26 RX ADMIN — ACETAMINOPHEN 650 MG: 325 TABLET ORAL at 02:13

## 2019-09-26 RX ADMIN — AMLODIPINE BESYLATE 5 MG: 5 TABLET ORAL at 08:06

## 2019-09-27 ENCOUNTER — READMISSION MANAGEMENT (OUTPATIENT)
Dept: CALL CENTER | Facility: HOSPITAL | Age: 79
End: 2019-09-27

## 2019-09-27 LAB
COCCIDIOIDES IGM SER QL: 0.5 IV
MVISTA(R) BLASTOMYCES AG: NORMAL NG/ML
SPECIMEN SOURCE: NORMAL

## 2019-09-27 NOTE — OUTREACH NOTE
Prep Survey      Responses   Facility patient discharged from?  [facility]   Is patient eligible?  Yes   Discharge diagnosis  HTN, hyponatremia, dysuria , pulmonary nodule of LLL   Does the patient have one of the following disease processes/diagnoses(primary or secondary)?  Other   Does the patient have Home health ordered?  No   Is there a DME ordered?  No   Comments regarding appointments  See AVS   Prep survey completed?  Yes          Tiesha Bazan RN

## 2019-10-01 ENCOUNTER — READMISSION MANAGEMENT (OUTPATIENT)
Dept: CALL CENTER | Facility: HOSPITAL | Age: 79
End: 2019-10-01

## 2019-10-01 NOTE — OUTREACH NOTE
Medical Week 1 Survey      Responses   Facility patient discharged from?  [facility]   Does the patient have one of the following disease processes/diagnoses(primary or secondary)?  Other   Is there a successful TCM telephone encounter documented?  No   Week 1 attempt successful?  No   Unsuccessful attempts  Attempt 1          Tori Chance RN

## 2019-10-02 ENCOUNTER — READMISSION MANAGEMENT (OUTPATIENT)
Dept: CALL CENTER | Facility: HOSPITAL | Age: 79
End: 2019-10-02

## 2019-10-02 NOTE — OUTREACH NOTE
Medical Week 1 Survey      Responses   Facility patient discharged from?  [facility]   Does the patient have one of the following disease processes/diagnoses(primary or secondary)?  Other   Is there a successful TCM telephone encounter documented?  No   Week 1 attempt successful?  No   Unsuccessful attempts  Attempt 2          Cyndy Hester RN

## 2019-10-03 ENCOUNTER — READMISSION MANAGEMENT (OUTPATIENT)
Dept: CALL CENTER | Facility: HOSPITAL | Age: 79
End: 2019-10-03

## 2019-10-03 NOTE — OUTREACH NOTE
Medical Week 2 Survey      Responses   Facility patient discharged from?  [facility]   Does the patient have one of the following disease processes/diagnoses(primary or secondary)?  Other   Week 2 attempt successful?  Yes   Call start time  1439   Discharge diagnosis  HTN, hyponatremia, dysuria , pulmonary nodule of LLL   Call end time  1440   Meds reviewed with patient/caregiver?  Yes   Is the patient having any side effects they believe may be caused by any medication additions or changes?  No   Does the patient have all medications ordered at discharge?  Yes   Is the patient taking all medications as directed (includes completed medication regime)?  Yes   Does the patient have a primary care provider?   Yes   Does the patient have an appointment with their PCP within 7 days of discharge?  Yes   Has the patient kept scheduled appointments due by today?  Yes   Has home health visited the patient within 72 hours of discharge?  N/A   Psychosocial issues?  No   Did the patient receive a copy of their discharge instructions?  Yes   Nursing interventions  Reviewed instructions with patient   What is the patient's perception of their health status since discharge?  Improving   Is the patient/caregiver able to teach back signs and symptoms related to disease process for when to call PCP?  Yes   Is the patient/caregiver able to teach back signs and symptoms related to disease process for when to call 911?  Yes   Is the patient/caregiver able to teach back the hierarchy of who to call/visit for symptoms/problems? PCP, Specialist, Home health nurse, Urgent Care, ED, 911  Yes   Additional teach back comments  Says she has seen the kidney drMarivel without changes.  Says she is doing ok at this time.   Week 2 Call Completed?  Yes          Mitzy Torres RN

## 2019-10-09 ENCOUNTER — OFFICE VISIT (OUTPATIENT)
Dept: PULMONOLOGY | Facility: CLINIC | Age: 79
End: 2019-10-09

## 2019-10-09 VITALS
SYSTOLIC BLOOD PRESSURE: 138 MMHG | WEIGHT: 122 LBS | HEIGHT: 68 IN | TEMPERATURE: 97.8 F | DIASTOLIC BLOOD PRESSURE: 68 MMHG | OXYGEN SATURATION: 96 % | HEART RATE: 78 BPM | BODY MASS INDEX: 18.49 KG/M2

## 2019-10-09 DIAGNOSIS — R76.8 POSITIVE ANA (ANTINUCLEAR ANTIBODY): Primary | ICD-10-CM

## 2019-10-09 DIAGNOSIS — R91.1 INCIDENTAL PULMONARY NODULE, GREATER THAN OR EQUAL TO 8MM: ICD-10-CM

## 2019-10-09 PROCEDURE — 99213 OFFICE O/P EST LOW 20 MIN: CPT | Performed by: PHYSICIAN ASSISTANT

## 2019-10-09 NOTE — PROGRESS NOTES
Interval history since last visit: None    Recent hospitalizations: Yes (Hospital Follow Up)    Investigations (imaging, PFT's, labs, sleep study, record requests, etc.) Tests done in hospital.     Have you had the Influenza Vaccine? yes    Would you like to receive this Vaccine today? no    Have you had the Pneumonia Vaccine?  no  Would you like to receive this Vaccine today? no    Subjective    Tila Cleveland presents for the following Hospital Follow Up      History of Present Illness     Patient presents today for follow-up of pulmonary nodule following recent hospitalization.  She was previously hospitalized from 9/21/2019 to 9/26/2019.  Initially admitted at that time for dizziness and uncontrolled hypertension.  She was also notable for hyponatremia upon assessment.  Imaging incidentally noted a parenchymal nodule of the left lower lobe.  Upon lab assessment, QuantiFERON-TB was negative.  ZENIA was positive as well as ZENIA SSA antibody was elevated.  Also notable for speckled pattern of 1-320.  Other autoimmune testing was negative.  She was recommended for outpatient rheumatology follow-up.  Fungal testing was negative as well.  For further assessment of the pulmonary nodule, she was recommended to complete a PET scan in the outpatient setting as risk of malignancy is 20% per Fleishner guidelines.   Further biopsying would depend upon PET scan results.    History is also significant for hypothyroidism, hypertension, ASCVD post stent placement.   Ambulation is partially impaired by chronic back pain.  Otherwise she is able to ambulate around her with minimal shortness of breath upon exertion.  No shortness of breath noted at rest.  She denies episodes of wheezing.  She denies any significant coughing, fever, chills, hemoptysis.  She denies a previous diagnosis of COPD.  She does not have a history of childhood asthma.  She is not aware of any significant snoring or apnea.  She does not use supplemental oxygen  during daytime or nighttime.  She has no previous history of DVT or PE.  She does have a previous smoking history of approximately 20 years with 1 pack/day use. She quit approximately 10 years ago.  She has since replaced smoking with tobacco dip use.      Review of Systems   Constitutional: Negative for activity change, fatigue and unexpected weight change.   HENT: Negative for congestion, postnasal drip and rhinorrhea.    Respiratory: Positive for shortness of breath (Occasional). Negative for apnea, cough, chest tightness and wheezing.    Cardiovascular: Negative for chest pain and palpitations.   Gastrointestinal: Negative for nausea.   Musculoskeletal: Positive for arthralgias and back pain.   Allergic/Immunologic: Negative for environmental allergies.   Psychiatric/Behavioral: Negative for agitation and confusion.       Active Problems:  Problem List Items Addressed This Visit     None            Past Medical History:  Past Medical History:   Diagnosis Date   • Disease of thyroid gland    • Hypertension    • Hypothyroidism        Family History:  History reviewed. No pertinent family history.    Social History:  Social History     Tobacco Use   • Smoking status: Former Smoker     Years: 30.00     Types: Cigarettes   • Smokeless tobacco: Current User     Types: Snuff   Substance Use Topics   • Alcohol use: No     Frequency: Never       Current Medications:  Current Outpatient Medications   Medication Sig Dispense Refill   • ALPRAZolam (XANAX) 1 MG tablet Take 1 mg by mouth At Night As Needed for Sleep.     • amLODIPine (NORVASC) 5 MG tablet Take 1/2 tablet by mouth Daily. 30 tablet 0   • aspirin 81 MG EC tablet Take 81 mg by mouth Daily.     • ferrous sulfate 325 (65 FE) MG tablet Take 1 tablet by mouth 2 Times a Day With Meals. 60 tablet 0   • HYDROcodone-acetaminophen (NORCO)  MG per tablet Take 1 tablet by mouth Every 8 (Eight) Hours As Needed for Moderate Pain .     • isosorbide dinitrate (ISORDIL) 30  "MG tablet Take 30 mg by mouth 2 (Two) Times a Day. PTA: Pt has been taking QD due to being unaware prescription was written for BID.     • levothyroxine (SYNTHROID, LEVOTHROID) 75 MCG tablet Take 1 tablet by mouth Daily. 30 tablet 0   • metoprolol succinate XL (TOPROL-XL) 25 MG 24 hr tablet Take 25 mg by mouth Daily.     • Vitamin D, Cholecalciferol, (CHOLECALCIFEROL) 400 units tablet Take 400 Units by mouth Daily.       No current facility-administered medications for this visit.        Allergies:  Allergies   Allergen Reactions   • Penicillins    • Quinolones        Vitals:  /68   Pulse 78   Temp 97.8 °F (36.6 °C) (Oral)   Ht 172.7 cm (68\")   Wt 55.3 kg (122 lb)   SpO2 96%   BMI 18.55 kg/m²     Imaging:    Imaging Results (most recent)     None          Pulmonary Functions Testing Results:    No results found for: FEV1, FVC, EKH3XST, TLC, DLCO    Results for orders placed or performed during the hospital encounter of 09/21/19   Blastomyces Antigen - Urine, Urine, Clean Catch   Result Value Ref Range    MVista(R) Blastomyces Ag None Detected None Detected ng/mL    Specimen Type URINE    Comprehensive Metabolic Panel   Result Value Ref Range    Glucose 107 (H) 65 - 99 mg/dL    BUN 15 8 - 23 mg/dL    Creatinine 0.72 0.57 - 1.00 mg/dL    Sodium 117 (C) 136 - 145 mmol/L    Potassium 4.6 3.5 - 5.2 mmol/L    Chloride 81 (L) 98 - 107 mmol/L    CO2 26.5 22.0 - 29.0 mmol/L    Calcium 9.5 8.6 - 10.5 mg/dL    Total Protein 7.5 6.0 - 8.5 g/dL    Albumin 4.30 3.50 - 5.20 g/dL    ALT (SGPT) 7 1 - 33 U/L    AST (SGOT) 20 1 - 32 U/L    Alkaline Phosphatase 90 39 - 117 U/L    Total Bilirubin 0.2 0.2 - 1.2 mg/dL    eGFR Non African Amer 78 >60 mL/min/1.73    Globulin 3.2 gm/dL    A/G Ratio 1.3 g/dL    BUN/Creatinine Ratio 20.8 7.0 - 25.0    Anion Gap 9.5 5.0 - 15.0 mmol/L   BNP   Result Value Ref Range    proBNP 999.7 5.0-1,800.0 pg/mL   Troponin   Result Value Ref Range    Troponin T <0.010 0.000 - 0.030 ng/mL "   C-reactive Protein   Result Value Ref Range    C-Reactive Protein 0.22 0.00 - 0.50 mg/dL   TSH   Result Value Ref Range    TSH 0.194 (L) 0.270 - 4.200 uIU/mL   Magnesium   Result Value Ref Range    Magnesium 1.7 1.6 - 2.4 mg/dL   CK   Result Value Ref Range    Creatine Kinase 74 20 - 180 U/L   CBC Auto Differential   Result Value Ref Range    WBC 3.98 3.40 - 10.80 10*3/mm3    RBC 3.87 3.77 - 5.28 10*6/mm3    Hemoglobin 9.8 (L) 12.0 - 15.9 g/dL    Hematocrit 30.4 (L) 34.0 - 46.6 %    MCV 78.6 (L) 79.0 - 97.0 fL    MCH 25.3 (L) 26.6 - 33.0 pg    MCHC 32.2 31.5 - 35.7 g/dL    RDW 14.9 12.3 - 15.4 %    RDW-SD 41.9 37.0 - 54.0 fl    MPV 8.4 6.0 - 12.0 fL    Platelets 232 140 - 450 10*3/mm3    Neutrophil % 56.6 42.7 - 76.0 %    Lymphocyte % 30.2 19.6 - 45.3 %    Monocyte % 11.6 5.0 - 12.0 %    Eosinophil % 0.8 0.3 - 6.2 %    Basophil % 0.8 0.0 - 1.5 %    Immature Grans % 0.0 0.0 - 0.5 %    Neutrophils, Absolute 2.26 1.70 - 7.00 10*3/mm3    Lymphocytes, Absolute 1.20 0.70 - 3.10 10*3/mm3    Monocytes, Absolute 0.46 0.10 - 0.90 10*3/mm3    Eosinophils, Absolute 0.03 0.00 - 0.40 10*3/mm3    Basophils, Absolute 0.03 0.00 - 0.20 10*3/mm3    Immature Grans, Absolute 0.00 0.00 - 0.05 10*3/mm3   T3, Free   Result Value Ref Range    T3, Free 1.96 (L) 2.00 - 4.40 pg/mL   T4, Free   Result Value Ref Range    Free T4 1.68 0.93 - 1.70 ng/dL   Troponin   Result Value Ref Range    Troponin T <0.010 0.000 - 0.030 ng/mL   Troponin   Result Value Ref Range    Troponin T <0.010 0.000 - 0.030 ng/mL   Creatinine, Urine, Random - Urine, Clean Catch   Result Value Ref Range    Creatinine, Urine 14.3 mg/dL   Urinalysis With Culture If Indicated - Urine, Clean Catch   Result Value Ref Range    Color, UA Yellow Yellow, Straw    Appearance, UA Clear Clear    pH, UA 7.5 5.0 - 8.0    Specific Gravity, UA 1.006 1.005 - 1.030    Glucose, UA Negative Negative    Ketones, UA Negative Negative    Bilirubin, UA Negative Negative    Blood, UA Negative  Negative    Protein, UA Negative Negative    Leuk Esterase, UA Negative Negative    Nitrite, UA Negative Negative    Urobilinogen, UA 0.2 E.U./dL 0.2 - 1.0 E.U./dL   Sodium, Urine, Random - Urine, Clean Catch   Result Value Ref Range    Sodium, Urine 58 mmol/L   Chloride, Urine, Random - Urine, Clean Catch   Result Value Ref Range    Chloride, Urine 42 mmol/L   CBC Auto Differential   Result Value Ref Range    WBC 6.43 3.40 - 10.80 10*3/mm3    RBC 3.97 3.77 - 5.28 10*6/mm3    Hemoglobin 10.0 (L) 12.0 - 15.9 g/dL    Hematocrit 30.9 (L) 34.0 - 46.6 %    MCV 77.8 (L) 79.0 - 97.0 fL    MCH 25.2 (L) 26.6 - 33.0 pg    MCHC 32.4 31.5 - 35.7 g/dL    RDW 14.9 12.3 - 15.4 %    RDW-SD 41.6 37.0 - 54.0 fl    MPV 8.7 6.0 - 12.0 fL    Platelets 253 140 - 450 10*3/mm3    Neutrophil % 66.6 42.7 - 76.0 %    Lymphocyte % 22.2 19.6 - 45.3 %    Monocyte % 10.3 5.0 - 12.0 %    Eosinophil % 0.3 0.3 - 6.2 %    Basophil % 0.3 0.0 - 1.5 %    Immature Grans % 0.3 0.0 - 0.5 %    Neutrophils, Absolute 4.28 1.70 - 7.00 10*3/mm3    Lymphocytes, Absolute 1.43 0.70 - 3.10 10*3/mm3    Monocytes, Absolute 0.66 0.10 - 0.90 10*3/mm3    Eosinophils, Absolute 0.02 0.00 - 0.40 10*3/mm3    Basophils, Absolute 0.02 0.00 - 0.20 10*3/mm3    Immature Grans, Absolute 0.02 0.00 - 0.05 10*3/mm3   Comprehensive Metabolic Panel   Result Value Ref Range    Glucose 116 (H) 65 - 99 mg/dL    BUN 12 8 - 23 mg/dL    Creatinine 0.61 0.57 - 1.00 mg/dL    Sodium 116 (C) 136 - 145 mmol/L    Potassium 4.1 3.5 - 5.2 mmol/L    Chloride 81 (L) 98 - 107 mmol/L    CO2 22.1 22.0 - 29.0 mmol/L    Calcium 9.2 8.6 - 10.5 mg/dL    Total Protein 7.3 6.0 - 8.5 g/dL    Albumin 4.00 3.50 - 5.20 g/dL    ALT (SGPT) 7 1 - 33 U/L    AST (SGOT) 21 1 - 32 U/L    Alkaline Phosphatase 86 39 - 117 U/L    Total Bilirubin 0.3 0.2 - 1.2 mg/dL    eGFR Non African Amer 94 >60 mL/min/1.73    Globulin 3.3 gm/dL    A/G Ratio 1.2 g/dL    BUN/Creatinine Ratio 19.7 7.0 - 25.0    Anion Gap 12.9 5.0 - 15.0  mmol/L   TSH   Result Value Ref Range    TSH 0.127 (L) 0.270 - 4.200 uIU/mL   Magnesium   Result Value Ref Range    Magnesium 3.1 (H) 1.6 - 2.4 mg/dL   Phosphorus   Result Value Ref Range    Phosphorus 3.0 2.5 - 4.5 mg/dL   Cortisol   Result Value Ref Range    Cortisol 17.54   mcg/dL   Iron Profile   Result Value Ref Range    Iron 32 (L) 37 - 145 mcg/dL    Iron Saturation 9 (L) 20 - 50 %    Transferrin 242 200 - 360 mg/dL    TIBC 361 298 - 536 mcg/dL   Ferritin   Result Value Ref Range    Ferritin 16.71 13.00 - 150.00 ng/mL   Vitamin B12   Result Value Ref Range    Vitamin B-12 226 211 - 946 pg/mL   Folate   Result Value Ref Range    Folate 11.00 4.78 - 24.20 ng/mL   Vitamin D 25 Hydroxy   Result Value Ref Range    25 Hydroxy, Vitamin D 37.5 30.0 - 100.0 ng/ml   Basic Metabolic Panel   Result Value Ref Range    Glucose 130 (H) 65 - 99 mg/dL    BUN 10 8 - 23 mg/dL    Creatinine 0.56 (L) 0.57 - 1.00 mg/dL    Sodium 120 (L) 136 - 145 mmol/L    Potassium 4.1 3.5 - 5.2 mmol/L    Chloride 83 (L) 98 - 107 mmol/L    CO2 22.5 22.0 - 29.0 mmol/L    Calcium 9.1 8.6 - 10.5 mg/dL    eGFR Non African Amer 104 >60 mL/min/1.73    BUN/Creatinine Ratio 17.9 7.0 - 25.0    Anion Gap 14.5 5.0 - 15.0 mmol/L   Troponin   Result Value Ref Range    Troponin T <0.010 0.000 - 0.030 ng/mL   Basic Metabolic Panel   Result Value Ref Range    Glucose 138 (H) 65 - 99 mg/dL    BUN 15 8 - 23 mg/dL    Creatinine 0.67 0.57 - 1.00 mg/dL    Sodium 118 (C) 136 - 145 mmol/L    Potassium 4.1 3.5 - 5.2 mmol/L    Chloride 80 (L) 98 - 107 mmol/L    CO2 21.0 (L) 22.0 - 29.0 mmol/L    Calcium 9.0 8.6 - 10.5 mg/dL    eGFR Non African Amer 85 >60 mL/min/1.73    BUN/Creatinine Ratio 22.4 7.0 - 25.0    Anion Gap 17.0 (H) 5.0 - 15.0 mmol/L   Potassium   Result Value Ref Range    Potassium 4.2 3.5 - 5.2 mmol/L   Potassium   Result Value Ref Range    Potassium 4.0 3.5 - 5.2 mmol/L   Basic Metabolic Panel   Result Value Ref Range    Glucose 129 (H) 65 - 99 mg/dL    BUN  15 8 - 23 mg/dL    Creatinine 0.66 0.57 - 1.00 mg/dL    Sodium 119 (C) 136 - 145 mmol/L    Potassium 4.2 3.5 - 5.2 mmol/L    Chloride 83 (L) 98 - 107 mmol/L    CO2 20.0 (L) 22.0 - 29.0 mmol/L    Calcium 8.8 8.6 - 10.5 mg/dL    eGFR Non African Amer 86 >60 mL/min/1.73    BUN/Creatinine Ratio 22.7 7.0 - 25.0    Anion Gap 16.0 (H) 5.0 - 15.0 mmol/L   Sodium   Result Value Ref Range    Sodium 119 (C) 136 - 145 mmol/L   Potassium   Result Value Ref Range    Potassium 3.8 3.5 - 5.2 mmol/L   Comprehensive Metabolic Panel   Result Value Ref Range    Glucose 102 (H) 65 - 99 mg/dL    BUN 17 8 - 23 mg/dL    Creatinine 0.66 0.57 - 1.00 mg/dL    Sodium 120 (L) 136 - 145 mmol/L    Potassium 4.0 3.5 - 5.2 mmol/L    Chloride 86 (L) 98 - 107 mmol/L    CO2 20.8 (L) 22.0 - 29.0 mmol/L    Calcium 8.9 8.6 - 10.5 mg/dL    Total Protein 6.6 6.0 - 8.5 g/dL    Albumin 3.65 3.50 - 5.20 g/dL    ALT (SGPT) 9 1 - 33 U/L    AST (SGOT) 26 1 - 32 U/L    Alkaline Phosphatase 79 39 - 117 U/L    Total Bilirubin 0.2 0.2 - 1.2 mg/dL    eGFR Non African Amer 86 >60 mL/min/1.73    Globulin 3.0 gm/dL    A/G Ratio 1.2 g/dL    BUN/Creatinine Ratio 25.8 (H) 7.0 - 25.0    Anion Gap 13.2 5.0 - 15.0 mmol/L   Magnesium   Result Value Ref Range    Magnesium 2.2 1.6 - 2.4 mg/dL   CBC Auto Differential   Result Value Ref Range    WBC 5.67 3.40 - 10.80 10*3/mm3    RBC 3.55 (L) 3.77 - 5.28 10*6/mm3    Hemoglobin 9.0 (L) 12.0 - 15.9 g/dL    Hematocrit 27.8 (L) 34.0 - 46.6 %    MCV 78.3 (L) 79.0 - 97.0 fL    MCH 25.4 (L) 26.6 - 33.0 pg    MCHC 32.4 31.5 - 35.7 g/dL    RDW 15.4 12.3 - 15.4 %    RDW-SD 42.3 37.0 - 54.0 fl    MPV 9.2 6.0 - 12.0 fL    Platelets 237 140 - 450 10*3/mm3    Neutrophil % 57.4 42.7 - 76.0 %    Lymphocyte % 28.2 19.6 - 45.3 %    Monocyte % 12.9 (H) 5.0 - 12.0 %    Eosinophil % 0.7 0.3 - 6.2 %    Basophil % 0.4 0.0 - 1.5 %    Immature Grans % 0.4 0.0 - 0.5 %    Neutrophils, Absolute 3.26 1.70 - 7.00 10*3/mm3    Lymphocytes, Absolute 1.60 0.70 -  3.10 10*3/mm3    Monocytes, Absolute 0.73 0.10 - 0.90 10*3/mm3    Eosinophils, Absolute 0.04 0.00 - 0.40 10*3/mm3    Basophils, Absolute 0.02 0.00 - 0.20 10*3/mm3    Immature Grans, Absolute 0.02 0.00 - 0.05 10*3/mm3   Sodium   Result Value Ref Range    Sodium 125 (L) 136 - 145 mmol/L   Potassium   Result Value Ref Range    Potassium 3.9 3.5 - 5.2 mmol/L   Sodium   Result Value Ref Range    Sodium 123 (L) 136 - 145 mmol/L   Potassium   Result Value Ref Range    Potassium 3.9 3.5 - 5.2 mmol/L   Osmolality, Urine - Urine, Clean Catch   Result Value Ref Range    Osmolality, Urine 394 mOsm/kg   Sodium   Result Value Ref Range    Sodium 122 (L) 136 - 145 mmol/L   Sodium   Result Value Ref Range    Sodium 123 (L) 136 - 145 mmol/L   Potassium   Result Value Ref Range    Potassium 4.6 3.5 - 5.2 mmol/L   QuantiFERON TB Plus Client Incubated   Result Value Ref Range    QuantiFERON Criteria Comment     QUANTIFERON TB1 AG VALUE 0.09 IU/mL    QUANTIFERON TB2 AG VALUE 0.07 IU/mL    QuantiFERON Nil Value 0.02 IU/mL    QuantiFERON Mitogen Value >10.00 IU/mL    QUANTIFERON-TB GOLD PLUS Negative Negative   ZENIA by IFA, Reflex 9-biomarkers profile   Result Value Ref Range    ZENIA Positive (A)     Please note Comment    ANCA Panel   Result Value Ref Range    Myeloperoxidase Ab <9.0 0.0 - 9.0 U/mL    Antiproteinase 3 (NV-3) <3.5 0.0 - 3.5 U/mL    C-ANCA <1:20 Neg:<1:20 titer    P-ANCA <1:20 Neg:<1:20 titer    Atypical pANCA 1:40 (H) Neg:<1:20 titer   Histoplasma Antigen ser   Result Value Ref Range    Histoplasma Galactomannan Ag Ser <0.5 <0.5 ng/mL    Disclaimer: Comment    Aspergillus Galactomannan Antigen   Result Value Ref Range    Aspergillus Ag, BAL/Serum 0.03 0.00 - 0.49 Index   Fungitell B-D Glucan   Result Value Ref Range    Result 58 <80 pg/mL   Coccidioides Ab, IgM by JAZZ   Result Value Ref Range    Coccidioides IgM 0.5 <=0.9 IV   Sodium   Result Value Ref Range    Sodium 122 (L) 136 - 145 mmol/L   Potassium   Result Value  Ref Range    Potassium 4.2 3.5 - 5.2 mmol/L   Potassium   Result Value Ref Range    Potassium 4.4 3.5 - 5.2 mmol/L   Comprehensive Metabolic Panel   Result Value Ref Range    Glucose 91 65 - 99 mg/dL    BUN 14 8 - 23 mg/dL    Creatinine 0.65 0.57 - 1.00 mg/dL    Sodium 127 (L) 136 - 145 mmol/L    Potassium 4.4 3.5 - 5.2 mmol/L    Chloride 92 (L) 98 - 107 mmol/L    CO2 22.6 22.0 - 29.0 mmol/L    Calcium 9.0 8.6 - 10.5 mg/dL    Total Protein 6.6 6.0 - 8.5 g/dL    Albumin 3.65 3.50 - 5.20 g/dL    ALT (SGPT) 9 1 - 33 U/L    AST (SGOT) 27 1 - 32 U/L    Alkaline Phosphatase 74 39 - 117 U/L    Total Bilirubin <0.2 (L) 0.2 - 1.2 mg/dL    eGFR Non African Amer 88 >60 mL/min/1.73    Globulin 3.0 gm/dL    A/G Ratio 1.2 g/dL    BUN/Creatinine Ratio 21.5 7.0 - 25.0    Anion Gap 12.4 5.0 - 15.0 mmol/L   Magnesium   Result Value Ref Range    Magnesium 2.2 1.6 - 2.4 mg/dL   CBC Auto Differential   Result Value Ref Range    WBC 5.22 3.40 - 10.80 10*3/mm3    RBC 3.62 (L) 3.77 - 5.28 10*6/mm3    Hemoglobin 9.2 (L) 12.0 - 15.9 g/dL    Hematocrit 28.9 (L) 34.0 - 46.6 %    MCV 79.8 79.0 - 97.0 fL    MCH 25.4 (L) 26.6 - 33.0 pg    MCHC 31.8 31.5 - 35.7 g/dL    RDW 15.9 (H) 12.3 - 15.4 %    RDW-SD 44.5 37.0 - 54.0 fl    MPV 9.2 6.0 - 12.0 fL    Platelets 231 140 - 450 10*3/mm3    Neutrophil % 56.5 42.7 - 76.0 %    Lymphocyte % 28.2 19.6 - 45.3 %    Monocyte % 13.2 (H) 5.0 - 12.0 %    Eosinophil % 1.3 0.3 - 6.2 %    Basophil % 0.6 0.0 - 1.5 %    Immature Grans % 0.2 0.0 - 0.5 %    Neutrophils, Absolute 2.95 1.70 - 7.00 10*3/mm3    Lymphocytes, Absolute 1.47 0.70 - 3.10 10*3/mm3    Monocytes, Absolute 0.69 0.10 - 0.90 10*3/mm3    Eosinophils, Absolute 0.07 0.00 - 0.40 10*3/mm3    Basophils, Absolute 0.03 0.00 - 0.20 10*3/mm3    Immature Grans, Absolute 0.01 0.00 - 0.05 10*3/mm3   Sodium   Result Value Ref Range    Sodium 127 (L) 136 - 145 mmol/L   Potassium   Result Value Ref Range    Potassium 4.8 3.5 - 5.2 mmol/L   Sodium   Result Value  Ref Range    Sodium 127 (L) 136 - 145 mmol/L   Potassium   Result Value Ref Range    Potassium 4.0 3.5 - 5.2 mmol/L   Osmolality, Urine - Urine, Clean Catch   Result Value Ref Range    Osmolality, Urine 354 mOsm/kg   Sodium   Result Value Ref Range    Sodium 128 (L) 136 - 145 mmol/L   Potassium   Result Value Ref Range    Potassium 4.5 3.5 - 5.2 mmol/L   Sodium   Result Value Ref Range    Sodium 129 (L) 136 - 145 mmol/L   Potassium   Result Value Ref Range    Potassium 4.5 3.5 - 5.2 mmol/L   Sodium   Result Value Ref Range    Sodium 129 (L) 136 - 145 mmol/L   Potassium   Result Value Ref Range    Potassium 4.4 3.5 - 5.2 mmol/L   Sodium   Result Value Ref Range    Sodium 128 (L) 136 - 145 mmol/L   Potassium   Result Value Ref Range    Potassium 4.1 3.5 - 5.2 mmol/L   Sodium   Result Value Ref Range    Sodium 130 (L) 136 - 145 mmol/L   Potassium   Result Value Ref Range    Potassium 4.2 3.5 - 5.2 mmol/L   Sodium   Result Value Ref Range    Sodium 130 (L) 136 - 145 mmol/L   Potassium   Result Value Ref Range    Potassium 4.5 3.5 - 5.2 mmol/L   Basic Metabolic Panel   Result Value Ref Range    Glucose 103 (H) 65 - 99 mg/dL    BUN 18 8 - 23 mg/dL    Creatinine 0.69 0.57 - 1.00 mg/dL    Sodium 130 (L) 136 - 145 mmol/L    Potassium 4.2 3.5 - 5.2 mmol/L    Chloride 95 (L) 98 - 107 mmol/L    CO2 21.7 (L) 22.0 - 29.0 mmol/L    Calcium 9.0 8.6 - 10.5 mg/dL    eGFR Non African Amer 82 >60 mL/min/1.73    BUN/Creatinine Ratio 26.1 (H) 7.0 - 25.0    Anion Gap 13.3 5.0 - 15.0 mmol/L   Sodium   Result Value Ref Range    Sodium 128 (L) 136 - 145 mmol/L   Potassium   Result Value Ref Range    Potassium 4.7 3.5 - 5.2 mmol/L   Sodium   Result Value Ref Range    Sodium 130 (L) 136 - 145 mmol/L   Potassium   Result Value Ref Range    Potassium 4.9 3.5 - 5.2 mmol/L   JOSEFA+DNA/DS+Antich+Centro+FA..   Result Value Ref Range    Speckled Pattern 1:320 (H)     Nuclear Dot Pattern >1:1280 (A)     Note: (Reference) Comment     Anti-DNA (DS) Ab Qn  1 0 - 9 IU/mL    RNP Antibodies <0.2 0.0 - 0.9 AI    Cho Antibodies <0.2 0.0 - 0.9 AI    Antiscleroderma-70 Antibodies <0.2 0.0 - 0.9 AI    JOSEFA SSA (RO) Ab >8.0 (H) 0.0 - 0.9 AI    JOSEFA SSB (LA) Ab <0.2 0.0 - 0.9 AI    Antichromatin Antibodies 0.2 0.0 - 0.9 AI    ROSELYN-1 IgG <0.2 0.0 - 0.9 AI    Anti-Centromere B Antibodies <0.2 0.0 - 0.9 AI    See below: Comment    Potassium   Result Value Ref Range    Potassium 4.4 3.5 - 5.2 mmol/L   Sodium   Result Value Ref Range    Sodium 130 (L) 136 - 145 mmol/L   Potassium   Result Value Ref Range    Potassium 4.3 3.5 - 5.2 mmol/L   Uric Acid   Result Value Ref Range    Uric Acid 3.2 2.4 - 5.7 mg/dL   Comprehensive Metabolic Panel   Result Value Ref Range    Glucose 97 65 - 99 mg/dL    BUN 16 8 - 23 mg/dL    Creatinine 0.69 0.57 - 1.00 mg/dL    Sodium 130 (L) 136 - 145 mmol/L    Potassium 4.4 3.5 - 5.2 mmol/L    Chloride 95 (L) 98 - 107 mmol/L    CO2 24.2 22.0 - 29.0 mmol/L    Calcium 8.9 8.6 - 10.5 mg/dL    Total Protein 6.8 6.0 - 8.5 g/dL    Albumin 3.65 3.50 - 5.20 g/dL    ALT (SGPT) 10 1 - 33 U/L    AST (SGOT) 21 1 - 32 U/L    Alkaline Phosphatase 76 39 - 117 U/L    Total Bilirubin <0.2 (L) 0.2 - 1.2 mg/dL    eGFR Non African Amer 82 >60 mL/min/1.73    Globulin 3.2 gm/dL    A/G Ratio 1.2 g/dL    BUN/Creatinine Ratio 23.2 7.0 - 25.0    Anion Gap 10.8 5.0 - 15.0 mmol/L   CBC Auto Differential   Result Value Ref Range    WBC 5.90 3.40 - 10.80 10*3/mm3    RBC 3.65 (L) 3.77 - 5.28 10*6/mm3    Hemoglobin 9.0 (L) 12.0 - 15.9 g/dL    Hematocrit 30.0 (L) 34.0 - 46.6 %    MCV 82.2 79.0 - 97.0 fL    MCH 24.7 (L) 26.6 - 33.0 pg    MCHC 30.0 (L) 31.5 - 35.7 g/dL    RDW 16.8 (H) 12.3 - 15.4 %    RDW-SD 48.9 37.0 - 54.0 fl    MPV 8.8 6.0 - 12.0 fL    Platelets 227 140 - 450 10*3/mm3    Neutrophil % 52.7 42.7 - 76.0 %    Lymphocyte % 30.5 19.6 - 45.3 %    Monocyte % 14.1 (H) 5.0 - 12.0 %    Eosinophil % 1.7 0.3 - 6.2 %    Basophil % 0.7 0.0 - 1.5 %    Immature Grans % 0.3 0.0 - 0.5 %     Neutrophils, Absolute 3.11 1.70 - 7.00 10*3/mm3    Lymphocytes, Absolute 1.80 0.70 - 3.10 10*3/mm3    Monocytes, Absolute 0.83 0.10 - 0.90 10*3/mm3    Eosinophils, Absolute 0.10 0.00 - 0.40 10*3/mm3    Basophils, Absolute 0.04 0.00 - 0.20 10*3/mm3    Immature Grans, Absolute 0.02 0.00 - 0.05 10*3/mm3   Sodium   Result Value Ref Range    Sodium 132 (L) 136 - 145 mmol/L   Potassium   Result Value Ref Range    Potassium 4.6 3.5 - 5.2 mmol/L   Adult Transthoracic Echo Complete W/ Cont if Necessary Per Protocol   Result Value Ref Range    BSA 1.7 m^2    IVSd 1.5 cm    LVIDd 3.1 cm    LVIDs 1.9 cm    LVPWd 1.4 cm    IVS/LVPW 1.1     FS 39.4 %    EDV(Teich) 37.0 ml    ESV(Teich) 10.6 ml    EF(Teich) 71.4 %    EDV(cubed) 28.9 ml    ESV(cubed) 6.5 ml    EF(cubed) 77.7 %    LV mass(C)d 148.1 grams    LV mass(C)dI 87.0 grams/m^2    SV(Teich) 26.4 ml    SI(Teich) 15.5 ml/m^2    SV(cubed) 22.5 ml    SI(cubed) 13.2 ml/m^2    Ao root diam 3.5 cm    Ao root area 9.4 cm^2    ACS 1.2 cm    LA dimension 2.6 cm    LA/Ao 0.74     LVOT diam 2.0 cm    LVOT area 3.1 cm^2    LVOT area(traced) 3.1 cm^2    LVLd ap4 5.5 cm    EDV(MOD-sp4) 28.0 ml    LVLs ap4 4.8 cm    ESV(MOD-sp4) 10.0 ml    EF(MOD-sp4) 64.3 %    SV(MOD-sp4) 18.0 ml    SI(MOD-sp4) 10.6 ml/m^2    Ao root area (BSA corrected) 2.0     LV Yoon Vol (BSA corrected) 16.5 ml/m^2    LV Sys Vol (BSA corrected) 5.9 ml/m^2    MV E max maco 38.5 cm/sec    MV A max maco 83.9 cm/sec    MV E/A 0.46     Ao pk maco 85.5 cm/sec    Ao max PG 2.9 mmHg    Ao V2 mean 59.4 cm/sec    Ao mean PG 1.6 mmHg    Ao V2 VTI 19.3 cm    SV(Ao) 180.7 ml    SI(Ao) 106.1 ml/m^2    PA acc slope 1,068 cm/sec^2    PA acc time 0.09 sec    TR max maco 236.2 cm/sec    RVSP(TR) 32.3 mmHg    RAP systole 10.0 mmHg    PA pr(Accel) 37.8 mmHg     CV ECHO LSO - BZI_BMI 19.8 kilograms/m^2     CV ECHO LOS - BSA(HAYCOCK) 1.7 m^2     CV ECHO LOS - BZI_METRIC_WEIGHT 59.0 kg     CV ECHO LOS - BZI_METRIC_HEIGHT 172.7 cm     Target HR (85%) 119 bpm    Max. Pred. HR (100%) 140 bpm       Objective   Physical Exam   Constitutional: She is oriented to person, place, and time. She appears well-developed and well-nourished. No distress.   Elderly female.   HENT:   Head: Normocephalic and atraumatic.   Nose: Nose normal.   Mouth/Throat: No oropharyngeal exudate.   Eyes: EOM are normal. Pupils are equal, round, and reactive to light.   Cardiovascular: Normal rate, regular rhythm, S1 normal, S2 normal and normal heart sounds.   Pulmonary/Chest: Effort normal.   Bilateral air entry positive and appreciated throughout.  No wheezing, rhonchi, or crackles.   Musculoskeletal: Normal range of motion. She exhibits no edema.   Neurological: She is alert and oriented to person, place, and time.   Skin: Skin is warm and dry. She is not diaphoretic.   Psychiatric: She has a normal mood and affect. Her behavior is normal.   Vitals reviewed.        Assessment/Plan      I have reviewed the past medical history, family history, social history, surgical history, and allergies.     I have reviewed the recent labs.  Labs on 9/23/2019 were significant for positive ZENIA,  Elevated JOSEFA SSA RO antibody, elevated speckled pattern ratio, and atypical p-ANCA.  Other labs including: TB, RNP antibodies, Cho antibodies, Coccidioides IgM, histoplasma galactomannan antigen, Blastomyces antigen, anticentromere B antibodies, anti-chromatin antibodies, anti-DNA antibody, c-ANCA, JOSEFA SSB antibody, Shanita-1 IgG, myeloperoxidase antibody, p-ANCA, anti-proteinase 3,  anti-scleroderma, Aspergillus antigen were negative.    I have reviewed the imaging of the previous CT of the chest completed on 9/22/2019.  This showed parenchymal nodule of the left lower lobe on image 39 measuring approximately 9.7 mm in the greatest dimension.  No other nodules present.  COPD changes are noted.    I have reviewed the echocardiogram completed on 9/22/2019.  This showed EF 56 to 60%, grade 1 diastolic  dysfunction, trace to mild mitral valve regurgitation, mild tricuspid valve regurgitation.  No evidence of pulmonary hypertension.  No pericardial effusion.          ICD-10-CM ICD-9-CM   1. Positive ZENIA (antinuclear antibody) R76.8 795.79   2. Incidental pulmonary nodule, greater than or equal to 8mm R91.1 793.11        Positive ZENIA:  · Lab testing during recent hospitalization was positive for ZENIA, elevated ZENIA SSA RO antibody, elevated speckled pattern ratio, and atypical p-ANCA.  · I have placed the referral to outpatient rheumatology for further lab assessment and management.      Left lower lobe parenchymal nodule, Former smoker:  · Recommended for outpatient PET scan following discharge during the recent hospitalization.  Nodule measured at 9.7 mm.  Patient also has previous smoking history and quit in the last 10 years.  · I have ordered for PET scan as there is 20% risk of malignancy following Fleishner guidelines.       Saturation noted at 96% today upon room air.  Patient denies significant dyspnea on exertion at this time or episodic shortness of breath, wheezing.  Patient preferred to await COPD work-up at this time, and believes that she did not need a rescue inhaler at this time.  We will reassess at the next visit.  My member present today confirm stability of symptoms at this time      Vaccinations: Up-to-date on influenza vaccination.  Awaiting pneumonia vaccination today.    Patient's Body mass index is 18.55 kg/m². BMI is within normal parameters. No follow-up required..      Return in about 6 months (around 4/9/2020), or as needed.

## 2019-10-18 ENCOUNTER — HOSPITAL ENCOUNTER (OUTPATIENT)
Dept: PET IMAGING | Facility: HOSPITAL | Age: 79
Discharge: HOME | End: 2019-10-18

## 2019-10-18 DIAGNOSIS — R91.1 INCIDENTAL PULMONARY NODULE, GREATER THAN OR EQUAL TO 8MM: ICD-10-CM

## 2019-10-25 ENCOUNTER — APPOINTMENT (OUTPATIENT)
Dept: PET IMAGING | Facility: HOSPITAL | Age: 79
End: 2019-10-25

## 2019-11-01 ENCOUNTER — HOSPITAL ENCOUNTER (OUTPATIENT)
Dept: PET IMAGING | Facility: HOSPITAL | Age: 79
Discharge: HOME | End: 2019-11-01
Admitting: PHYSICIAN ASSISTANT

## 2019-11-01 PROCEDURE — 0 FLUDEOXYGLUCOSE F18 SOLUTION: Performed by: PHYSICIAN ASSISTANT

## 2019-11-01 PROCEDURE — 78815 PET IMAGE W/CT SKULL-THIGH: CPT | Performed by: RADIOLOGY

## 2019-11-01 PROCEDURE — A9552 F18 FDG: HCPCS | Performed by: PHYSICIAN ASSISTANT

## 2019-11-01 PROCEDURE — 78815 PET IMAGE W/CT SKULL-THIGH: CPT

## 2019-11-01 RX ADMIN — FLUDEOXYGLUCOSE F18 1 DOSE: 300 INJECTION INTRAVENOUS at 12:20

## 2019-11-08 ENCOUNTER — TELEPHONE (OUTPATIENT)
Dept: PULMONOLOGY | Facility: CLINIC | Age: 79
End: 2019-11-08

## 2019-11-08 DIAGNOSIS — R91.1 LEFT LOWER LOBE PULMONARY NODULE: Primary | ICD-10-CM

## 2019-11-08 NOTE — TELEPHONE ENCOUNTER
Called patient with PET scan results for left lower lobe pulmonary nodule with recommend PET scan imaging as it was previously noted at 9.7 mm in size on 9/22/2019.     PET scan showed the 1 cm nodule persisted without hypermetabolic activity.  Recommended for repeat CT imaging in 3 months.   Also notable for focal airspace disease of the right upper lobe without significant hypermetabolism, concerning for resolving phase pneumonia or pneumonitis.    Discussed findings and stated that we will follow-up with another CT without contrast in 3 months.   · Ordered CT chest without contrast for February 3rd 2020  Discussed the findings of possible resolving pneumonia/pneumonitis.  She stated that she had recently had a head cold or allergies, and had recently seen her primary care physician but otherwise was feeling well at this time.  I am awaiting antibiotic therapy as the pneumonia may have resolved, but discussed if symptoms worsen to follow-up with her primary care provider.  She conveyed understanding.

## 2020-01-17 ENCOUNTER — HOSPITAL ENCOUNTER (OUTPATIENT)
Dept: CT IMAGING | Facility: HOSPITAL | Age: 80
Discharge: HOME | End: 2020-01-17
Admitting: PHYSICIAN ASSISTANT

## 2020-01-17 DIAGNOSIS — R91.1 LEFT LOWER LOBE PULMONARY NODULE: ICD-10-CM

## 2020-01-17 PROCEDURE — 71250 CT THORAX DX C-: CPT

## 2020-01-17 PROCEDURE — 71250 CT THORAX DX C-: CPT | Performed by: RADIOLOGY

## 2020-01-27 ENCOUNTER — TELEPHONE (OUTPATIENT)
Dept: PULMONOLOGY | Facility: CLINIC | Age: 80
End: 2020-01-27

## 2020-01-27 DIAGNOSIS — R91.1 LEFT LOWER LOBE PULMONARY NODULE: Primary | ICD-10-CM

## 2020-01-27 NOTE — TELEPHONE ENCOUNTER
Called the patient with CT scan results.  She was able to answer the phone.  I discussed that the CT was notable for a slight decrease in size, but overall stability of the 1 cm pulmonary nodule of the left lower lobe.    Discussed follow-up recommendations with Dr. Castellon. Recommended repeat imaging in 6 months.   She would still like to proceed with follow-up imaging and monitoring at this time.  She remains asymptomatic overall with no complaints of shortness of breath or hemoptysis.    She believes she is currently scheduled for a follow-up in March.  I provided the office phone number to call and confirm the appointment or schedule I as needed with us.  She was agreeable with this plan.     · Ordered CT chest without contrast for July 2020 for 6-month follow-up of 1 cm left lower lobe nodule

## 2020-07-31 ENCOUNTER — HOSPITAL ENCOUNTER (OUTPATIENT)
Dept: CT IMAGING | Facility: HOSPITAL | Age: 80
Discharge: HOME | End: 2020-07-31
Admitting: PHYSICIAN ASSISTANT

## 2020-07-31 DIAGNOSIS — R91.1 LEFT LOWER LOBE PULMONARY NODULE: ICD-10-CM

## 2020-07-31 PROCEDURE — 71250 CT THORAX DX C-: CPT | Performed by: RADIOLOGY

## 2020-07-31 PROCEDURE — 71250 CT THORAX DX C-: CPT

## 2020-08-07 ENCOUNTER — TELEPHONE (OUTPATIENT)
Dept: PULMONOLOGY | Facility: CLINIC | Age: 80
End: 2020-08-07

## 2020-08-07 DIAGNOSIS — R91.1 LEFT LOWER LOBE PULMONARY NODULE: Primary | ICD-10-CM

## 2020-08-07 NOTE — TELEPHONE ENCOUNTER
Call patient with CT scan results.  This was notable for stability of the left lower lobe nodule.  It continues to measure approximately 1.2 cm.  Calcifications of the upper right lung also appear stable as compared to the previous imaging.  Thus, the nodule stable over 6 months.  Previous PET scan was nonsuggestive of hypermetabolic activity.    Discussed recommendations for repeat scan in approximately 8 months for continued monitoring.  She was agreeable.      Left lower 1.2 cm nodule:   · Ordered CT chest without contrast for March 2021.

## 2021-02-26 ENCOUNTER — IMMUNIZATION (OUTPATIENT)
Dept: VACCINE CLINIC | Facility: HOSPITAL | Age: 81
End: 2021-02-26

## 2021-02-26 PROCEDURE — 0001A: CPT | Performed by: INTERNAL MEDICINE

## 2021-02-26 PROCEDURE — 91300 HC SARSCOV02 VAC 30MCG/0.3ML IM: CPT | Performed by: INTERNAL MEDICINE

## 2021-03-19 ENCOUNTER — IMMUNIZATION (OUTPATIENT)
Dept: VACCINE CLINIC | Facility: HOSPITAL | Age: 81
End: 2021-03-19

## 2021-03-19 PROCEDURE — 91300 HC SARSCOV02 VAC 30MCG/0.3ML IM: CPT | Performed by: INTERNAL MEDICINE

## 2021-03-19 PROCEDURE — 0002A: CPT | Performed by: INTERNAL MEDICINE

## 2021-03-25 ENCOUNTER — HOSPITAL ENCOUNTER (OUTPATIENT)
Dept: CT IMAGING | Facility: HOSPITAL | Age: 81
Discharge: HOME | End: 2021-03-25
Admitting: PHYSICIAN ASSISTANT

## 2021-03-25 DIAGNOSIS — R91.1 LEFT LOWER LOBE PULMONARY NODULE: ICD-10-CM

## 2021-03-25 PROCEDURE — 71250 CT THORAX DX C-: CPT

## 2021-03-25 PROCEDURE — 71250 CT THORAX DX C-: CPT | Performed by: RADIOLOGY

## 2021-03-26 ENCOUNTER — APPOINTMENT (OUTPATIENT)
Dept: CT IMAGING | Facility: HOSPITAL | Age: 81
End: 2021-03-26

## 2021-04-01 ENCOUNTER — TELEPHONE (OUTPATIENT)
Dept: PULMONOLOGY | Facility: CLINIC | Age: 81
End: 2021-04-01

## 2021-04-01 DIAGNOSIS — R91.1 LEFT LOWER LOBE PULMONARY NODULE: Primary | ICD-10-CM

## 2021-04-01 NOTE — TELEPHONE ENCOUNTER
Called patient with CT scan results.         1) Previously referred to rheumatology for positive ZENIA and multiple calcified nodules.   2) Reviewed results with Dr. Castellon. Recommended referral to Dr. Sainz for possible biopsy due to slight increase in size over the last 8 months.     · Ordered CT surgery referral to Dr. Sainz as Mrs. Cleveland was agreeable with this recommendation.

## 2021-04-21 ENCOUNTER — OFFICE VISIT (OUTPATIENT)
Dept: CARDIAC SURGERY | Facility: CLINIC | Age: 81
End: 2021-04-21

## 2021-04-21 VITALS
SYSTOLIC BLOOD PRESSURE: 149 MMHG | WEIGHT: 137 LBS | OXYGEN SATURATION: 97 % | HEART RATE: 76 BPM | DIASTOLIC BLOOD PRESSURE: 78 MMHG | TEMPERATURE: 97.5 F | BODY MASS INDEX: 20.76 KG/M2 | HEIGHT: 68 IN

## 2021-04-21 DIAGNOSIS — R91.1 LEFT LOWER LOBE PULMONARY NODULE: Primary | ICD-10-CM

## 2021-04-21 PROCEDURE — 99203 OFFICE O/P NEW LOW 30 MIN: CPT | Performed by: THORACIC SURGERY (CARDIOTHORACIC VASCULAR SURGERY)

## 2021-04-21 NOTE — PROGRESS NOTES
04/21/2021  Patient Information  [place] [address]   1940  'PCP/Referring Physician'  Hayden Luciano MD  [phone]  Nandini Concepcion, ASUNCION*  [phone]  Chief Complaint   Patient presents with   • Consult     Np referred a left lower lobe lung nodule that has increased in size,complains of having a cough in the mornings.   • Lung Nodule       History of Present Illness: 81-year-old  female with a history of hypertension, coronary artery disease status post stenting and previous tobacco abuse who presents with a left lower lobe lung nodule.  The patient has chronic back pain that is unchanged.  She denies a cough other than in the morning, hemoptysis, lymphadenopathy, fevers or chills.  She has subjectively lost some weight over the past year but cannot quantify the weight.      Patient Active Problem List   Diagnosis   • Left lower lobe pulmonary nodule     Past Medical History:   Diagnosis Date   • Disease of thyroid gland    • Hypertension    • Hypothyroidism    • Lung nodule      Past Surgical History:   Procedure Laterality Date   • CORONARY ANGIOPLASTY WITH STENT PLACEMENT     • THYROIDECTOMY, PARTIAL         Current Outpatient Medications:   •  ALPRAZolam (XANAX) 1 MG tablet, Take 1 mg by mouth At Night As Needed for Sleep., Disp: , Rfl:   •  amLODIPine (NORVASC) 5 MG tablet, Take 1/2 tablet by mouth Daily., Disp: 30 tablet, Rfl: 0  •  HYDROcodone-acetaminophen (NORCO)  MG per tablet, Take 1 tablet by mouth Every 8 (Eight) Hours As Needed for Moderate Pain ., Disp: , Rfl:   •  isosorbide dinitrate (ISORDIL) 30 MG tablet, Take 30 mg by mouth 2 (Two) Times a Day. PTA: Pt has been taking QD due to being unaware prescription was written for BID., Disp: , Rfl:   •  levothyroxine (SYNTHROID, LEVOTHROID) 75 MCG tablet, Take 1 tablet by mouth Daily., Disp: 30 tablet,  Rfl: 0  •  metoprolol succinate XL (TOPROL-XL) 25 MG 24 hr tablet, Take 25 mg by mouth Daily., Disp: , Rfl:   •  aspirin 81 MG EC tablet, Take 81 mg by mouth Daily., Disp: , Rfl:   •  ferrous sulfate 325 (65 FE) MG tablet, Take 1 tablet by mouth 2 Times a Day With Meals., Disp: 60 tablet, Rfl: 0  •  Vitamin D, Cholecalciferol, (CHOLECALCIFEROL) 400 units tablet, Take 400 Units by mouth Daily., Disp: , Rfl:   Allergies   Allergen Reactions   • Quinolones Other (See Comments)     Can not recall reaction but she had to go to the hospital.   • Penicillins Rash     Social History     Socioeconomic History   • Marital status:      Spouse name: Not on file   • Number of children: 3   • Years of education: Not on file   • Highest education level: Not on file   Tobacco Use   • Smoking status: Former Smoker     Packs/day: 1.00     Years: 20.00     Pack years: 20.00     Types: Cigarettes     Quit date:      Years since quittin.3   • Smokeless tobacco: Current User     Types: Snuff   Substance and Sexual Activity   • Alcohol use: No   • Drug use: No   • Sexual activity: Defer     Family History   Problem Relation Age of Onset   • Diabetes Mother    • Stroke Mother    • Lung cancer Father      Review of Systems   Constitutional: Positive for malaise/fatigue. Negative for chills, fever, night sweats and weight loss.   HENT: Negative for hearing loss, odynophagia and sore throat.    Cardiovascular: Negative for chest pain, dyspnea on exertion, leg swelling, orthopnea and palpitations.   Respiratory: Positive for cough (in the mornings). Negative for hemoptysis.    Endocrine: Negative for cold intolerance, heat intolerance, polydipsia, polyphagia and polyuria.   Hematologic/Lymphatic: Does not bruise/bleed easily.   Skin: Negative for itching and rash.   Musculoskeletal: Positive for back pain. Negative for joint pain, joint swelling and myalgias.   Gastrointestinal: Negative for abdominal pain, constipation,  "diarrhea, hematemesis, hematochezia, melena, nausea and vomiting.   Genitourinary: Negative for dysuria, frequency and hematuria.   Neurological: Positive for numbness (leftleg). Negative for focal weakness, headaches and seizures.   Psychiatric/Behavioral: Negative for suicidal ideas. The patient has insomnia.    All other systems reviewed and are negative.    Vitals:    04/21/21 1323   BP: 149/78   BP Location: Left arm   Patient Position: Sitting   Pulse: 76   Temp: 97.5 °F (36.4 °C)   SpO2: 97%   Weight: 62.1 kg (137 lb)   Height: 172.7 cm (68\")      Physical Exam  Vitals reviewed.   Constitutional:       General: She is not in acute distress.     Appearance: Normal appearance.      Comments: Elderly  female who is present with her granddaughter and great granddaughter.  The patient has a rolling walker.   HENT:      Head: Normocephalic and atraumatic.      Nose: Nose normal.   Eyes:      General: No scleral icterus.  Cardiovascular:      Rate and Rhythm: Normal rate and regular rhythm.      Heart sounds: No murmur heard.   No friction rub. No gallop.    Pulmonary:      Effort: Pulmonary effort is normal. No respiratory distress.      Breath sounds: Normal breath sounds. No rhonchi.   Abdominal:      General: There is no distension.      Palpations: Abdomen is soft. There is no mass.      Tenderness: There is no abdominal tenderness. There is no guarding or rebound.   Musculoskeletal:         General: No deformity.      Cervical back: Neck supple.      Right lower leg: No edema.      Left lower leg: No edema.   Lymphadenopathy:      Cervical: No cervical adenopathy.      Upper Body:      Right upper body: No supraclavicular or axillary adenopathy.      Left upper body: No supraclavicular or axillary adenopathy.   Skin:     General: Skin is warm and dry.      Coloration: Skin is not jaundiced.   Neurological:      Mental Status: She is alert and oriented to person, place, and time.      Gait: Gait " normal.   Psychiatric:         Mood and Affect: Mood normal.         Behavior: Behavior normal.         Thought Content: Thought content normal.         Judgment: Judgment normal.         The ROS, past medical history, surgical history, family history, social history and vitals were reviewed by myself and corrected as needed.      Labs/Imaging:  -CT of the chest without contrast performed 3/25/2021, personally reviewed, demonstrates a 1.2 cm irregular left lower lobe lung nodule.  This has minimally changed as compared to CT scan performed back in 2019.    Assessment/Plan:  81-year-old  female with a history of hypertension, coronary artery disease status post stenting and previous tobacco abuse who presents with a slightly enlarging left lower lobe lung nodule.  I discussed with the patient performing a CT-guided needle biopsy.  The patient did not wish to proceed with biopsy at this time and wanted to pursue continued surveillance imaging.  I will arrange for a repeat CT scan of the chest in 6 months to evaluate for interval growth.  If this nodule continues to grow, we will again discuss possible biopsy.  The patient will follow-up in surgical clinic in 6 months to discuss the results of her repeat scan.    Patient Active Problem List   Diagnosis   • Left lower lobe pulmonary nodule

## 2021-10-05 ENCOUNTER — HOSPITAL ENCOUNTER (OUTPATIENT)
Dept: CT IMAGING | Facility: HOSPITAL | Age: 81
Discharge: HOME | End: 2021-10-05
Admitting: THORACIC SURGERY (CARDIOTHORACIC VASCULAR SURGERY)

## 2021-10-05 DIAGNOSIS — R91.1 LEFT LOWER LOBE PULMONARY NODULE: ICD-10-CM

## 2021-10-05 PROCEDURE — 71250 CT THORAX DX C-: CPT

## 2021-10-05 PROCEDURE — 71250 CT THORAX DX C-: CPT | Performed by: RADIOLOGY

## 2021-10-13 ENCOUNTER — OFFICE VISIT (OUTPATIENT)
Dept: CARDIAC SURGERY | Facility: CLINIC | Age: 81
End: 2021-10-13

## 2021-10-13 VITALS
BODY MASS INDEX: 23.82 KG/M2 | HEART RATE: 90 BPM | HEIGHT: 65 IN | DIASTOLIC BLOOD PRESSURE: 62 MMHG | SYSTOLIC BLOOD PRESSURE: 108 MMHG | TEMPERATURE: 98.4 F | OXYGEN SATURATION: 93 % | WEIGHT: 143 LBS

## 2021-10-13 DIAGNOSIS — R91.1 LEFT LOWER LOBE PULMONARY NODULE: Primary | ICD-10-CM

## 2021-10-13 PROCEDURE — 99214 OFFICE O/P EST MOD 30 MIN: CPT | Performed by: NURSE PRACTITIONER

## 2021-10-13 RX ORDER — ERGOCALCIFEROL 1.25 MG/1
1 CAPSULE ORAL WEEKLY
COMMUNITY
Start: 2021-06-07 | End: 2022-06-17

## 2021-10-13 RX ORDER — CLOTRIMAZOLE 10 MG/1
LOZENGE ORAL; TOPICAL
COMMUNITY
Start: 2021-10-08 | End: 2022-06-17

## 2021-10-13 NOTE — PROGRESS NOTES
[facility] Cardiothoracic Surgery Office Follow Up Note     Date of Encounter: 10/13/2021     Name: Tila Cleveland  : 1940     Referred By: No ref. provider found  PCP: Jodi Guthrie APRN    Chief Complaint:    Chief Complaint   Patient presents with   • Follow-up     ^month folllwo up ct chest Left lung nodule. Pt states that she is fatigeud sometimes but no other complaints. Denies SOB        Subjective      History of Present Illness:    Tila Cleveland is a 81 y.o. female former smoker, HTN, CAD s/p stenting and left lower lobe lung nodule. Last seen in clinic by Dr Sainz for slightly enlarging left lower lobe lung nodule. At that time Dr Sainz recommended CT guided needle biopsy, but patient wanted to pursue continued surveillance imaging. Patient presents today for 6 month f/u with imaging. She denies a cough other than in the morning, hemoptysis, lymphadenopathy, weight loss, fevers or chills.  Reports she has been feeling well overall.     Review of Systems:  Review of Systems   Constitutional: Positive for malaise/fatigue. Negative for chills, decreased appetite, fever, night sweats, weight gain and weight loss.   HENT: Negative for hearing loss, odynophagia and sore throat.    Cardiovascular: Negative for chest pain, claudication, cyanosis, dyspnea on exertion, irregular heartbeat, leg swelling, near-syncope, orthopnea, palpitations, paroxysmal nocturnal dyspnea and syncope.   Respiratory: Positive for cough. Negative for hemoptysis.    Endocrine: Negative for cold intolerance, heat intolerance, polydipsia, polyphagia and polyuria.   Hematologic/Lymphatic: Negative for adenopathy. Does not bruise/bleed easily.   Skin: Negative for itching and rash.   Musculoskeletal: Positive for back pain, joint pain, neck pain and stiffness. Negative for arthritis, falls, gout, joint swelling, muscle weakness and myalgias.   Gastrointestinal: Negative for abdominal pain, anorexia, constipation,  diarrhea, dysphagia, heartburn, hematemesis, hematochezia, melena, nausea and vomiting.   Genitourinary: Negative for dysuria, frequency and hematuria.   Neurological: Negative for dizziness, focal weakness, headaches, loss of balance, numbness, seizures, vertigo and weakness.   Psychiatric/Behavioral: Negative for altered mental status, depression, substance abuse and suicidal ideas. The patient is not nervous/anxious.    Allergic/Immunologic: Positive for environmental allergies. Negative for HIV exposure and persistent infections.   All other systems reviewed and are negative.      I have reviewed the following portions of the patient's history: allergies, current medications, past family history, past medical history, past social history, past surgical history and ROS and confirm it's accurate.    Allergies:  Allergies   Allergen Reactions   • Quinolones Other (See Comments)     Can not recall reaction but she had to go to the hospital.   • Penicillins Rash       Medications:      Current Outpatient Medications:   •  ALPRAZolam (XANAX) 1 MG tablet, Take 1 mg by mouth At Night As Needed for Sleep., Disp: , Rfl:   •  amLODIPine (NORVASC) 5 MG tablet, Take 1/2 tablet by mouth Daily., Disp: 30 tablet, Rfl: 0  •  aspirin 81 MG EC tablet, Take 81 mg by mouth Daily., Disp: , Rfl:   •  ergocalciferol (ERGOCALCIFEROL) 1.25 MG (59839 UT) capsule, Take 1 capsule by mouth 1 (One) Time Per Week., Disp: , Rfl:   •  HYDROcodone-acetaminophen (NORCO)  MG per tablet, Take 1 tablet by mouth Every 8 (Eight) Hours As Needed for Moderate Pain ., Disp: , Rfl:   •  isosorbide dinitrate (ISORDIL) 30 MG tablet, Take 30 mg by mouth 2 (Two) Times a Day. PTA: Pt has been taking QD due to being unaware prescription was written for BID., Disp: , Rfl:   •  levothyroxine (SYNTHROID, LEVOTHROID) 75 MCG tablet, Take 1 tablet by mouth Daily., Disp: 30 tablet, Rfl: 0  •  metoprolol succinate XL (TOPROL-XL) 25 MG 24 hr tablet, Take 25 mg by  "mouth Daily., Disp: , Rfl:   •  Vitamin D, Cholecalciferol, (CHOLECALCIFEROL) 400 units tablet, Take 400 Units by mouth Daily., Disp: , Rfl:   •  clotrimazole (MYCELEX) 10 MG vanessa, DISSOLVE 1 TABLET ON TONGUE SLOWLY IN MOUTH 5 TIMES A DAY AS NEEDED, Disp: , Rfl:   •  ferrous sulfate 325 (65 FE) MG tablet, Take 1 tablet by mouth 2 Times a Day With Meals., Disp: 60 tablet, Rfl: 0    History:   Past Medical History:   Diagnosis Date   • Disease of thyroid gland    • Hypertension    • Hypothyroidism    • Lung nodule        Past Surgical History:   Procedure Laterality Date   • CORONARY ANGIOPLASTY WITH STENT PLACEMENT     • THYROIDECTOMY, PARTIAL         Social History     Socioeconomic History   • Marital status:    • Number of children: 3   Tobacco Use   • Smoking status: Former Smoker     Packs/day: 1.00     Years: 20.00     Pack years: 20.00     Types: Cigarettes     Quit date:      Years since quittin.7   • Smokeless tobacco: Current User     Types: Snuff   Vaping Use   • Vaping Use: Never used   Substance and Sexual Activity   • Alcohol use: No   • Drug use: No   • Sexual activity: Defer        Family History   Problem Relation Age of Onset   • Diabetes Mother    • Stroke Mother    • Lung cancer Father        Objective     Physical Exam:  Vitals:    10/13/21 1156   BP: 108/62   Pulse: 90   Temp: 98.4 °F (36.9 °C)   SpO2: 93%   Weight: 64.9 kg (143 lb)   Height: 165.1 cm (65\")      Body mass index is 23.8 kg/m².    Physical Exam  Vitals and nursing note reviewed.   Constitutional:       General: She is awake.      Appearance: Normal appearance.   HENT:      Head: Normocephalic and atraumatic.   Eyes:      Pupils: Pupils are equal, round, and reactive to light.   Cardiovascular:      Rate and Rhythm: Normal rate and regular rhythm.      Pulses: Normal pulses.      Heart sounds: Normal heart sounds, S1 normal and S2 normal.   Pulmonary:      Effort: Pulmonary effort is normal.      Breath sounds: " Decreased air movement present.   Chest:   Breasts:      Right: No axillary adenopathy or supraclavicular adenopathy.      Left: No axillary adenopathy or supraclavicular adenopathy.       Abdominal:      Palpations: Abdomen is soft.   Musculoskeletal:         General: Normal range of motion.      Cervical back: Normal range of motion and neck supple.      Right lower leg: No edema.      Left lower leg: No edema.   Lymphadenopathy:      Cervical: No cervical adenopathy.      Right cervical: No superficial, deep or posterior cervical adenopathy.     Left cervical: No superficial, deep or posterior cervical adenopathy.      Upper Body:      Right upper body: No supraclavicular or axillary adenopathy.      Left upper body: No supraclavicular or axillary adenopathy.   Skin:     General: Skin is warm and dry.      Capillary Refill: Capillary refill takes less than 2 seconds.      Findings: No lesion.      Nails: There is no clubbing.      Comments: Thoracotomy incision: well healing with wound edges approximated, no surrounding erythema, edema, tenderness or induration on palpation  Chest tube site: closed with no surround erythema, warmth, or tenderness    Neurological:      General: No focal deficit present.      Mental Status: She is alert and oriented to person, place, and time. Mental status is at baseline.      GCS: GCS eye subscore is 4. GCS verbal subscore is 5. GCS motor subscore is 6.      Cranial Nerves: Cranial nerves are intact.      Sensory: Sensation is intact.      Motor: Motor function is intact.      Coordination: Coordination is intact.      Gait: Gait is intact.   Psychiatric:         Mood and Affect: Mood and affect normal. Mood is not depressed.         Speech: Speech normal.         Behavior: Behavior normal. Behavior is cooperative.         Thought Content: Thought content normal.         Judgment: Judgment normal.         Imaging/Labs:    CT Chest Without Contrast Diagnostic 10/5/2021  Personally  Reviewed: Nodule essentially unchanged, measurement 1.2 consistent with radiology impression below.  1.  1.3 cm parenchymal nodule in the left lung is stable. 2.  No new parenchymal nodules or masses. 3.  Coronary artery calcifications.    This report was finalized on 10/5/2021 1:21 PM by Dr. tOf Davidson MD.       CT of the chest without contrast 3/25/2021:   personally reviewed, demonstrates a 1.2 cm irregular left lower lobe lung nodule.  This has minimally changed as compared to CT scan performed back in 2019.      Assessment / Plan      Assessment / Plan:  Diagnoses and all orders for this visit:    1. Left lower lobe pulmonary nodule (Primary)       1. Left lower lobe pulmonary nodule: CT scan personally reviewed and compared to previous imaging, stable. Patient denies any new acute complaints or systemic symptoms. Discussed Dr Sainz's recommendation for CT guided needle biopsy again and she would like to continue surveillance imagine at this time due to stability of nodule. Will plan to see patient back in 6 months with CT Chest.       Follow Up:   Return in about 6 months (around 4/13/2022) for F/u with imaging CT Chest .   Or sooner for any further concerns or worsening sign and symptoms. If unable to reach us in the office please dial 911 or go to the nearest emergency department.      Mary WOODARD  [facility] Cardiothoracic Surgery    Time Spent: I spent 35 minutes caring for Tila on this date of service. This time includes time spent by me in the following activities: preparing for the visit, reviewing tests, obtaining and/or reviewing a separately obtained history, performing a medically appropriate examination and/or evaluation, counseling and educating the patient/family/caregiver, ordering medications, tests, or procedures, referring and communicating with other health care professionals, documenting information in the medical record and independently interpreting results and  communicating that information with the patient/family/caregiver.

## 2022-02-25 DIAGNOSIS — R91.1 LEFT LOWER LOBE PULMONARY NODULE: Primary | ICD-10-CM

## 2022-04-01 ENCOUNTER — HOSPITAL ENCOUNTER (OUTPATIENT)
Dept: CT IMAGING | Facility: HOSPITAL | Age: 82
Discharge: HOME | End: 2022-04-01
Admitting: NURSE PRACTITIONER

## 2022-04-01 DIAGNOSIS — R91.1 LEFT LOWER LOBE PULMONARY NODULE: ICD-10-CM

## 2022-04-01 LAB — CREAT BLDA-MCNC: 0.8 MG/DL (ref 0.6–1.3)

## 2022-04-01 PROCEDURE — 82565 ASSAY OF CREATININE: CPT

## 2022-04-01 PROCEDURE — 71270 CT THORAX DX C-/C+: CPT

## 2022-04-01 PROCEDURE — 71270 CT THORAX DX C-/C+: CPT | Performed by: RADIOLOGY

## 2022-04-01 PROCEDURE — 25010000002 IOPAMIDOL 61 % SOLUTION: Performed by: NURSE PRACTITIONER

## 2022-04-01 RX ADMIN — IOPAMIDOL 65 ML: 612 INJECTION, SOLUTION INTRAVENOUS at 14:31

## 2022-04-21 ENCOUNTER — APPOINTMENT (OUTPATIENT)
Dept: GENERAL RADIOLOGY | Facility: HOSPITAL | Age: 82
End: 2022-04-21

## 2022-04-21 ENCOUNTER — HOSPITAL ENCOUNTER (EMERGENCY)
Facility: HOSPITAL | Age: 82
Discharge: HOME | End: 2022-04-21
Attending: EMERGENCY MEDICINE | Admitting: EMERGENCY MEDICINE

## 2022-04-21 VITALS
WEIGHT: 150 LBS | OXYGEN SATURATION: 94 % | DIASTOLIC BLOOD PRESSURE: 93 MMHG | BODY MASS INDEX: 23.54 KG/M2 | HEIGHT: 67 IN | HEART RATE: 72 BPM | TEMPERATURE: 97 F | RESPIRATION RATE: 18 BRPM | SYSTOLIC BLOOD PRESSURE: 179 MMHG

## 2022-04-21 DIAGNOSIS — A49.9 BACTERIAL UTI: Primary | ICD-10-CM

## 2022-04-21 DIAGNOSIS — N39.0 BACTERIAL UTI: Primary | ICD-10-CM

## 2022-04-21 LAB
ALBUMIN SERPL-MCNC: 3.45 G/DL (ref 3.5–5.2)
ALBUMIN/GLOB SERPL: 1.1 G/DL
ALP SERPL-CCNC: 74 U/L (ref 39–117)
ALT SERPL W P-5'-P-CCNC: 7 U/L (ref 1–33)
AMPHET+METHAMPHET UR QL: NEGATIVE
AMPHETAMINES UR QL: NEGATIVE
ANION GAP SERPL CALCULATED.3IONS-SCNC: 10.7 MMOL/L (ref 5–15)
APTT PPP: 28.7 SECONDS (ref 26.5–34.5)
AST SERPL-CCNC: 21 U/L (ref 1–32)
BACTERIA UR QL AUTO: ABNORMAL /HPF
BARBITURATES UR QL SCN: NEGATIVE
BASOPHILS # BLD AUTO: 0.04 10*3/MM3 (ref 0–0.2)
BASOPHILS NFR BLD AUTO: 0.8 % (ref 0–1.5)
BENZODIAZ UR QL SCN: POSITIVE
BILIRUB SERPL-MCNC: 0.4 MG/DL (ref 0–1.2)
BILIRUB UR QL STRIP: NEGATIVE
BUN SERPL-MCNC: 11 MG/DL (ref 8–23)
BUN/CREAT SERPL: 10.5 (ref 7–25)
BUPRENORPHINE SERPL-MCNC: NEGATIVE NG/ML
CALCIUM SPEC-SCNC: 8.5 MG/DL (ref 8.6–10.5)
CANNABINOIDS SERPL QL: NEGATIVE
CHLORIDE SERPL-SCNC: 97 MMOL/L (ref 98–107)
CK SERPL-CCNC: 58 U/L (ref 20–180)
CLARITY UR: ABNORMAL
CO2 SERPL-SCNC: 24.3 MMOL/L (ref 22–29)
COCAINE UR QL: NEGATIVE
COLOR UR: YELLOW
CREAT SERPL-MCNC: 1.05 MG/DL (ref 0.57–1)
CRP SERPL-MCNC: 2.54 MG/DL (ref 0–0.5)
D-LACTATE SERPL-SCNC: 1 MMOL/L (ref 0.5–2)
DEPRECATED RDW RBC AUTO: 46.5 FL (ref 37–54)
EGFRCR SERPLBLD CKD-EPI 2021: 52.8 ML/MIN/1.73
EOSINOPHIL # BLD AUTO: 0.02 10*3/MM3 (ref 0–0.4)
EOSINOPHIL NFR BLD AUTO: 0.4 % (ref 0.3–6.2)
ERYTHROCYTE [DISTWIDTH] IN BLOOD BY AUTOMATED COUNT: 13.1 % (ref 12.3–15.4)
ERYTHROCYTE [SEDIMENTATION RATE] IN BLOOD: 10 MM/HR (ref 0–30)
FLUAV RNA RESP QL NAA+PROBE: NOT DETECTED
FLUBV RNA RESP QL NAA+PROBE: NOT DETECTED
GLOBULIN UR ELPH-MCNC: 3.3 GM/DL
GLUCOSE SERPL-MCNC: 115 MG/DL (ref 65–99)
GLUCOSE UR STRIP-MCNC: NEGATIVE MG/DL
HCT VFR BLD AUTO: 37.5 % (ref 34–46.6)
HGB BLD-MCNC: 12.6 G/DL (ref 12–15.9)
HGB UR QL STRIP.AUTO: ABNORMAL
HYALINE CASTS UR QL AUTO: ABNORMAL /LPF
IMM GRANULOCYTES # BLD AUTO: 0.02 10*3/MM3 (ref 0–0.05)
IMM GRANULOCYTES NFR BLD AUTO: 0.4 % (ref 0–0.5)
INR PPP: 1.07 (ref 0.9–1.1)
KETONES UR QL STRIP: NEGATIVE
LEUKOCYTE ESTERASE UR QL STRIP.AUTO: ABNORMAL
LIPASE SERPL-CCNC: 72 U/L (ref 13–60)
LYMPHOCYTES # BLD AUTO: 1.29 10*3/MM3 (ref 0.7–3.1)
LYMPHOCYTES NFR BLD AUTO: 26.5 % (ref 19.6–45.3)
MAGNESIUM SERPL-MCNC: 2 MG/DL (ref 1.6–2.4)
MCH RBC QN AUTO: 32.3 PG (ref 26.6–33)
MCHC RBC AUTO-ENTMCNC: 33.6 G/DL (ref 31.5–35.7)
MCV RBC AUTO: 96.2 FL (ref 79–97)
METHADONE UR QL SCN: NEGATIVE
MONOCYTES # BLD AUTO: 0.51 10*3/MM3 (ref 0.1–0.9)
MONOCYTES NFR BLD AUTO: 10.5 % (ref 5–12)
NEUTROPHILS NFR BLD AUTO: 2.98 10*3/MM3 (ref 1.7–7)
NEUTROPHILS NFR BLD AUTO: 61.4 % (ref 42.7–76)
NITRITE UR QL STRIP: NEGATIVE
NRBC BLD AUTO-RTO: 0 /100 WBC (ref 0–0.2)
NT-PROBNP SERPL-MCNC: 700.4 PG/ML (ref 0–1800)
OPIATES UR QL: POSITIVE
OXYCODONE UR QL SCN: NEGATIVE
PCP UR QL SCN: NEGATIVE
PH UR STRIP.AUTO: >=9 [PH] (ref 5–8)
PLATELET # BLD AUTO: 194 10*3/MM3 (ref 140–450)
PMV BLD AUTO: 9 FL (ref 6–12)
POTASSIUM SERPL-SCNC: 4.4 MMOL/L (ref 3.5–5.2)
PROCALCITONIN SERPL-MCNC: 0.08 NG/ML (ref 0–0.25)
PROPOXYPH UR QL: NEGATIVE
PROT SERPL-MCNC: 6.7 G/DL (ref 6–8.5)
PROT UR QL STRIP: NEGATIVE
PROTHROMBIN TIME: 14.2 SECONDS (ref 12.1–14.7)
RBC # BLD AUTO: 3.9 10*6/MM3 (ref 3.77–5.28)
RBC # UR STRIP: ABNORMAL /HPF
REF LAB TEST METHOD: ABNORMAL
SARS-COV-2 RNA RESP QL NAA+PROBE: NOT DETECTED
SODIUM SERPL-SCNC: 132 MMOL/L (ref 136–145)
SP GR UR STRIP: 1.01 (ref 1–1.03)
SQUAMOUS #/AREA URNS HPF: ABNORMAL /HPF
T4 FREE SERPL-MCNC: 0.92 NG/DL (ref 0.93–1.7)
TRICYCLICS UR QL SCN: NEGATIVE
TROPONIN T SERPL-MCNC: 0.01 NG/ML (ref 0–0.03)
TROPONIN T SERPL-MCNC: <0.01 NG/ML (ref 0–0.03)
TSH SERPL DL<=0.05 MIU/L-ACNC: 33.18 UIU/ML (ref 0.27–4.2)
UROBILINOGEN UR QL STRIP: ABNORMAL
WBC # UR STRIP: ABNORMAL /HPF
WBC NRBC COR # BLD: 4.86 10*3/MM3 (ref 3.4–10.8)

## 2022-04-21 PROCEDURE — 85025 COMPLETE CBC W/AUTO DIFF WBC: CPT | Performed by: EMERGENCY MEDICINE

## 2022-04-21 PROCEDURE — 96365 THER/PROPH/DIAG IV INF INIT: CPT

## 2022-04-21 PROCEDURE — 82550 ASSAY OF CK (CPK): CPT | Performed by: EMERGENCY MEDICINE

## 2022-04-21 PROCEDURE — 71045 X-RAY EXAM CHEST 1 VIEW: CPT

## 2022-04-21 PROCEDURE — 83690 ASSAY OF LIPASE: CPT | Performed by: EMERGENCY MEDICINE

## 2022-04-21 PROCEDURE — 85730 THROMBOPLASTIN TIME PARTIAL: CPT | Performed by: EMERGENCY MEDICINE

## 2022-04-21 PROCEDURE — 80053 COMPREHEN METABOLIC PANEL: CPT | Performed by: EMERGENCY MEDICINE

## 2022-04-21 PROCEDURE — 85610 PROTHROMBIN TIME: CPT | Performed by: EMERGENCY MEDICINE

## 2022-04-21 PROCEDURE — 93010 ELECTROCARDIOGRAM REPORT: CPT | Performed by: INTERNAL MEDICINE

## 2022-04-21 PROCEDURE — 25010000002 CEFTRIAXONE PER 250 MG: Performed by: EMERGENCY MEDICINE

## 2022-04-21 PROCEDURE — 84145 PROCALCITONIN (PCT): CPT | Performed by: EMERGENCY MEDICINE

## 2022-04-21 PROCEDURE — 83605 ASSAY OF LACTIC ACID: CPT | Performed by: EMERGENCY MEDICINE

## 2022-04-21 PROCEDURE — 87086 URINE CULTURE/COLONY COUNT: CPT | Performed by: EMERGENCY MEDICINE

## 2022-04-21 PROCEDURE — 81001 URINALYSIS AUTO W/SCOPE: CPT | Performed by: EMERGENCY MEDICINE

## 2022-04-21 PROCEDURE — 83880 ASSAY OF NATRIURETIC PEPTIDE: CPT | Performed by: EMERGENCY MEDICINE

## 2022-04-21 PROCEDURE — 84439 ASSAY OF FREE THYROXINE: CPT | Performed by: EMERGENCY MEDICINE

## 2022-04-21 PROCEDURE — 36415 COLL VENOUS BLD VENIPUNCTURE: CPT

## 2022-04-21 PROCEDURE — 85652 RBC SED RATE AUTOMATED: CPT | Performed by: EMERGENCY MEDICINE

## 2022-04-21 PROCEDURE — 83735 ASSAY OF MAGNESIUM: CPT | Performed by: EMERGENCY MEDICINE

## 2022-04-21 PROCEDURE — 84484 ASSAY OF TROPONIN QUANT: CPT | Performed by: EMERGENCY MEDICINE

## 2022-04-21 PROCEDURE — 87636 SARSCOV2 & INF A&B AMP PRB: CPT | Performed by: EMERGENCY MEDICINE

## 2022-04-21 PROCEDURE — 93005 ELECTROCARDIOGRAM TRACING: CPT | Performed by: EMERGENCY MEDICINE

## 2022-04-21 PROCEDURE — 84443 ASSAY THYROID STIM HORMONE: CPT | Performed by: EMERGENCY MEDICINE

## 2022-04-21 PROCEDURE — 80306 DRUG TEST PRSMV INSTRMNT: CPT | Performed by: EMERGENCY MEDICINE

## 2022-04-21 PROCEDURE — 87040 BLOOD CULTURE FOR BACTERIA: CPT | Performed by: EMERGENCY MEDICINE

## 2022-04-21 PROCEDURE — 86140 C-REACTIVE PROTEIN: CPT | Performed by: EMERGENCY MEDICINE

## 2022-04-21 PROCEDURE — 99284 EMERGENCY DEPT VISIT MOD MDM: CPT

## 2022-04-21 RX ORDER — CEFDINIR 300 MG/1
300 CAPSULE ORAL 2 TIMES DAILY
Qty: 20 CAPSULE | Refills: 0 | Status: SHIPPED | OUTPATIENT
Start: 2022-04-21 | End: 2022-06-17

## 2022-04-21 RX ORDER — SODIUM CHLORIDE 0.9 % (FLUSH) 0.9 %
10 SYRINGE (ML) INJECTION AS NEEDED
Status: DISCONTINUED | OUTPATIENT
Start: 2022-04-21 | End: 2022-04-21 | Stop reason: HOSPADM

## 2022-04-21 RX ADMIN — SODIUM CHLORIDE 500 ML: 9 INJECTION, SOLUTION INTRAVENOUS at 01:51

## 2022-04-21 RX ADMIN — CEFTRIAXONE 1 G: 1 INJECTION, POWDER, FOR SOLUTION INTRAMUSCULAR; INTRAVENOUS at 05:40

## 2022-04-21 NOTE — ED PROVIDER NOTES
Subjective     History provided by:  Patient   used: No    Weakness - Generalized  Severity:  Mild  Onset quality:  Gradual  Timing:  Intermittent  Progression:  Improving  Chronicity:  New  Context: not alcohol use, not allergies, not change in medication, not decreased sleep, not dehydration, not drug use, not increased activity, not pinched nerve, not recent infection, not stress and not urinary tract infection    Relieved by:  Nothing  Worsened by:  Nothing  Ineffective treatments:  None tried  Associated symptoms: headaches    Associated symptoms: no abdominal pain, no anorexia, no aphasia, no arthralgias, no ataxia, no chest pain, no cough, no diarrhea, no difficulty walking, no dizziness, no drooling, no dysphagia, no dysuria, no numbness in extremities, no falls, no fever, no foul-smelling urine, no frequency, no hematochezia, no lethargy, no loss of consciousness, no melena, no myalgias, no nausea, no near-syncope, no seizures, no sensory-motor deficit, no shortness of breath, no stroke symptoms, no syncope, no urgency, no vision change and no vomiting    Risk factors: no anemia, no congestive heart failure, no coronary artery disease, no diabetes, no excessive menstruation, no family hx of stroke, no heart disease, no neurologic disease, no new medications and no recent stressors        Review of Systems   Constitutional: Negative for activity change, appetite change, chills, diaphoresis, fatigue and fever.   HENT: Negative for congestion, drooling, ear pain and sore throat.    Eyes: Negative for redness.   Respiratory: Negative for cough, chest tightness, shortness of breath and wheezing.    Cardiovascular: Negative for chest pain, palpitations, leg swelling, syncope and near-syncope.   Gastrointestinal: Negative for abdominal pain, anorexia, diarrhea, dysphagia, hematochezia, melena, nausea and vomiting.   Genitourinary: Negative for dysuria, frequency and urgency.   Musculoskeletal:  Negative for arthralgias, back pain, falls, myalgias and neck pain.   Skin: Negative for pallor, rash and wound.   Neurological: Positive for weakness and headaches. Negative for dizziness, seizures, loss of consciousness and speech difficulty.   Psychiatric/Behavioral: Negative for agitation, behavioral problems, confusion and decreased concentration.   All other systems reviewed and are negative.      Past Medical History:   Diagnosis Date   • Disease of thyroid gland    • Hypertension    • Hypothyroidism    • Lung nodule        Allergies   Allergen Reactions   • Quinolones Other (See Comments)     Can not recall reaction but she had to go to the hospital.   • Penicillins Rash       Past Surgical History:   Procedure Laterality Date   • CORONARY ANGIOPLASTY WITH STENT PLACEMENT     • THYROIDECTOMY, PARTIAL         Family History   Problem Relation Age of Onset   • Diabetes Mother    • Stroke Mother    • Lung cancer Father        Social History     Socioeconomic History   • Marital status:    • Number of children: 3   Tobacco Use   • Smoking status: Former Smoker     Packs/day: 1.00     Years: 20.00     Pack years: 20.00     Types: Cigarettes     Quit date:      Years since quittin.3   • Smokeless tobacco: Current User     Types: Snuff   Vaping Use   • Vaping Use: Never used   Substance and Sexual Activity   • Alcohol use: No   • Drug use: No   • Sexual activity: Defer           Objective   Physical Exam  Vitals and nursing note reviewed.   Constitutional:       General: She is not in acute distress.     Appearance: Normal appearance. She is well-developed. She is not toxic-appearing or diaphoretic.   HENT:      Head: Normocephalic and atraumatic.      Right Ear: External ear normal.      Left Ear: External ear normal.      Nose: Nose normal.      Mouth/Throat:      Pharynx: No oropharyngeal exudate.      Tonsils: No tonsillar exudate.   Eyes:      General: Lids are normal.      Conjunctiva/sclera:  Conjunctivae normal.      Pupils: Pupils are equal, round, and reactive to light.   Neck:      Thyroid: No thyromegaly.   Cardiovascular:      Rate and Rhythm: Normal rate and regular rhythm.      Pulses: Normal pulses.      Heart sounds: Normal heart sounds, S1 normal and S2 normal.   Pulmonary:      Effort: Pulmonary effort is normal. No tachypnea or respiratory distress.      Breath sounds: Normal breath sounds. No decreased breath sounds, wheezing or rales.   Chest:      Chest wall: No tenderness.   Abdominal:      General: Bowel sounds are normal. There is no distension.      Palpations: Abdomen is soft.      Tenderness: There is no abdominal tenderness. There is no guarding or rebound.   Musculoskeletal:         General: No tenderness or deformity. Normal range of motion.      Cervical back: Full passive range of motion without pain, normal range of motion and neck supple.   Lymphadenopathy:      Cervical: No cervical adenopathy.   Skin:     General: Skin is warm and dry.      Coloration: Skin is not pale.      Findings: No erythema or rash.   Neurological:      Mental Status: She is alert and oriented to person, place, and time.      GCS: GCS eye subscore is 4. GCS verbal subscore is 5. GCS motor subscore is 6.      Cranial Nerves: No cranial nerve deficit.      Sensory: No sensory deficit.   Psychiatric:         Speech: Speech normal.         Behavior: Behavior normal.         Thought Content: Thought content normal.         Judgment: Judgment normal.         Procedures           ED Course  ED Course as of 04/21/22 0550   Thu Apr 21, 2022   0308 ECG 12 Lead  Vent. rate 68 BPM  AL interval 156 ms  QRS duration 108 ms  QT/QTcB 444/472 ms  P-R-T axes 93 13 -29  Normal sinus rhythm  Possible Anterior infarct (cited on or before 21-SEP-2019)  Abnormal ECG  When compared with ECG of 21-SEP-2019 16:46,  Questionable change in initial forces of Septal leads  Nonspecific T wave abnormality now evident in Inferior  leads  Nonspecific T wave abnormality now evident in Lateral leads [ES]      ED Course User Index  [ES] Azael Marquez MD                                                 MDM  Number of Diagnoses or Management Options  Bacterial UTI: new and requires workup     Amount and/or Complexity of Data Reviewed  Clinical lab tests: reviewed and ordered  Tests in the radiology section of CPT®: reviewed and ordered  Tests in the medicine section of CPT®: reviewed and ordered  Review and summarize past medical records: yes  Independent visualization of images, tracings, or specimens: yes    Risk of Complications, Morbidity, and/or Mortality  Presenting problems: moderate  Diagnostic procedures: moderate  Management options: moderate    Patient Progress  Patient progress: stable      Final diagnoses:   Bacterial UTI       ED Disposition  ED Disposition     ED Disposition   Discharge    Condition   Stable    Comment   --             Jodi Guthrie, YAMILET  [address] [facility] [id number] [phone]    Schedule an appointment as soon as possible for a visit in 1 day  MANNY         Medication List      New Prescriptions    cefdinir 300 MG capsule  Commonly known as: OMNICEF  Take 1 capsule by mouth 2 (Two) Times a Day.           Where to Get Your Medications      These medications were sent to [id number] - [place], [place] - [address] [place] [address] AT [facility] OF [facility] [phone] [facility] [phone]   [address] [place] [id number]    Phone: [phone]   · cefdinir 300 MG capsule          Azael Marquez MD  04/21/22 4258

## 2022-04-22 LAB
BACTERIA SPEC AEROBE CULT: NORMAL
QT INTERVAL: 444 MS
QTC INTERVAL: 472 MS

## 2022-04-25 ENCOUNTER — TRANSCRIBE ORDERS (OUTPATIENT)
Dept: ADMINISTRATIVE | Facility: HOSPITAL | Age: 82
End: 2022-04-25

## 2022-04-25 DIAGNOSIS — E04.9 ENLARGEMENT OF THYROID: Primary | ICD-10-CM

## 2022-04-26 LAB
BACTERIA SPEC AEROBE CULT: NORMAL
BACTERIA SPEC AEROBE CULT: NORMAL

## 2022-04-29 ENCOUNTER — HOSPITAL ENCOUNTER (OUTPATIENT)
Dept: ULTRASOUND IMAGING | Facility: HOSPITAL | Age: 82
Discharge: HOME | End: 2022-04-29
Admitting: NURSE PRACTITIONER

## 2022-04-29 DIAGNOSIS — E04.9 ENLARGEMENT OF THYROID: ICD-10-CM

## 2022-04-29 PROCEDURE — 76536 US EXAM OF HEAD AND NECK: CPT

## 2022-04-29 PROCEDURE — 76536 US EXAM OF HEAD AND NECK: CPT | Performed by: RADIOLOGY

## 2022-05-23 ENCOUNTER — TRANSCRIBE ORDERS (OUTPATIENT)
Dept: ADMINISTRATIVE | Facility: HOSPITAL | Age: 82
End: 2022-05-23

## 2022-05-23 DIAGNOSIS — R13.19 OTHER DYSPHAGIA: Primary | ICD-10-CM

## 2022-05-27 ENCOUNTER — HOSPITAL ENCOUNTER (OUTPATIENT)
Dept: GENERAL RADIOLOGY | Facility: HOSPITAL | Age: 82
Discharge: HOME | End: 2022-05-27
Admitting: NURSE PRACTITIONER

## 2022-05-27 DIAGNOSIS — R13.19 OTHER DYSPHAGIA: ICD-10-CM

## 2022-05-27 PROCEDURE — 74220 X-RAY XM ESOPHAGUS 1CNTRST: CPT | Performed by: RADIOLOGY

## 2022-05-27 PROCEDURE — 74220 X-RAY XM ESOPHAGUS 1CNTRST: CPT

## 2022-06-03 ENCOUNTER — TELEPHONE (OUTPATIENT)
Dept: CARDIAC SURGERY | Facility: CLINIC | Age: 82
End: 2022-06-03

## 2022-06-03 NOTE — TELEPHONE ENCOUNTER
Caller: FREDO QUIÑONEZ    Relationship to patient: Other Relative    Best call back number: [phone]    Type of visit: FOLLOW UP     Additional notes: PATIENT IS REQUESTING THE VISIT ON 7-20-22 BE COMPLETED VIA TELEHEALTH. WOULD LIKE A CALL BACK TO CONFIRM.    BEST CALL BACK NUMBER IS FREDO [phone].

## 2022-06-09 NOTE — TELEPHONE ENCOUNTER
This patient needs to come in and be seen in person per the provider's instructions, this visit is not appropriate for a telehealth visit.  I called the pt herself no answer or VM available, I also called the Grandson Bhavin and left him a message about the appt not being a telehealth visit and that her f/u would be in [place] in July. Left the same message with Tiesha Pires and requested that they both call this office back so that we may talk with them to confirm that they understand the appt.

## 2022-06-17 ENCOUNTER — OFFICE VISIT (OUTPATIENT)
Dept: GASTROENTEROLOGY | Facility: CLINIC | Age: 82
End: 2022-06-17

## 2022-06-17 VITALS — WEIGHT: 134 LBS | BODY MASS INDEX: 20.99 KG/M2

## 2022-06-17 DIAGNOSIS — R13.19 ESOPHAGEAL DYSPHAGIA: Primary | ICD-10-CM

## 2022-06-17 PROCEDURE — 99214 OFFICE O/P EST MOD 30 MIN: CPT | Performed by: PHYSICIAN ASSISTANT

## 2022-06-17 RX ORDER — DICYCLOMINE HYDROCHLORIDE 10 MG/1
10 CAPSULE ORAL
Qty: 120 CAPSULE | Refills: 0 | Status: SHIPPED | OUTPATIENT
Start: 2022-06-17

## 2022-06-17 NOTE — PROGRESS NOTES
Chief Complaint   Patient presents with   • Difficulty Swallowing       Tila Cleveland is a 82 y.o. female who presents to the office today for evaluation of Difficulty Swallowing  .    HPI  Patient presents the clinic today for evaluation of difficulty swallowing.  She was referred to has her primary care who recently performed a esophagram that showed fairly large volume of solid aspiration that occurred during the test.  It was recommended she complete a video esophagram to further evaluate the oral phase of swallowing.  She is also experiencing severe tertiary contractions in the distal half of the esophagus.  There was no evidence of esophageal stricture.  Patient states that she is experiencing a sensation like something is hanging in her throat.  She will also occasionally get a hoarse voice.  She denies any heartburn/reflux currently.  Patient has mainly issues with liquids versus solids.  They have been adding liquid thickener to thin substances to help with swallowing which has not made a significant improvement.  She also complains of having a mucus-like sensation in the esophagus that she is unable to cough up.  Review of Systems   Constitutional: Negative for fever.   HENT: Positive for trouble swallowing.    Eyes: Negative.    Respiratory: Negative.    Cardiovascular: Negative.    Gastrointestinal: Negative for abdominal distention, abdominal pain, anal bleeding, blood in stool, constipation, diarrhea, nausea and vomiting.   Endocrine: Negative.    Genitourinary: Negative.    Musculoskeletal: Negative.    Skin: Negative.    Allergic/Immunologic: Negative.    Neurological: Negative.    Hematological: Negative.    Psychiatric/Behavioral: Negative.        ACTIVE PROBLEMS:   Specialty Problems    None         PAST MEDICAL HISTORY:  Past Medical History:   Diagnosis Date   • Disease of thyroid gland    • Hypertension    • Hypothyroidism    • Lung nodule        SURGICAL HISTORY:  Past Surgical History:    Procedure Laterality Date   • CORONARY ANGIOPLASTY WITH STENT PLACEMENT     • THYROIDECTOMY, PARTIAL         FAMILY HISTORY:  Family History   Problem Relation Age of Onset   • Diabetes Mother    • Stroke Mother    • Lung cancer Father        SOCIAL HISTORY:  Social History     Tobacco Use   • Smoking status: Former Smoker     Packs/day: 1.00     Years: 20.00     Pack years: 20.00     Types: Cigarettes     Quit date:      Years since quittin.4   • Smokeless tobacco: Current User     Types: Snuff   Substance Use Topics   • Alcohol use: No       CURRENT MEDICATION:    Current Outpatient Medications:   •  ALPRAZolam (XANAX) 1 MG tablet, Take 1 mg by mouth At Night As Needed for Sleep., Disp: , Rfl:   •  amLODIPine (NORVASC) 5 MG tablet, Take 1/2 tablet by mouth Daily., Disp: 30 tablet, Rfl: 0  •  HYDROcodone-acetaminophen (NORCO)  MG per tablet, Take 1 tablet by mouth Every 8 (Eight) Hours As Needed for Moderate Pain ., Disp: , Rfl:   •  isosorbide dinitrate (ISORDIL) 30 MG tablet, Take 30 mg by mouth 2 (Two) Times a Day. PTA: Pt has been taking QD due to being unaware prescription was written for BID., Disp: , Rfl:   •  levothyroxine (SYNTHROID, LEVOTHROID) 75 MCG tablet, Take 1 tablet by mouth Daily., Disp: 30 tablet, Rfl: 0  •  metoprolol succinate XL (TOPROL-XL) 25 MG 24 hr tablet, Take 25 mg by mouth Daily., Disp: , Rfl:   •  dicyclomine (Bentyl) 10 MG capsule, Take 1 capsule by mouth 4 (Four) Times a Day Before Meals & at Bedtime., Disp: 120 capsule, Rfl: 0    ALLERGIES:  Quinolones and Penicillins    VISIT VITALS:  Wt 60.8 kg (134 lb)   BMI 20.99 kg/m²   Physical Exam  Constitutional:       General: She is not in acute distress.     Appearance: Normal appearance. She is well-developed.   HENT:      Head: Normocephalic and atraumatic.   Eyes:      Pupils: Pupils are equal, round, and reactive to light.   Cardiovascular:      Rate and Rhythm: Normal rate and regular rhythm.      Heart sounds:  Normal heart sounds.   Pulmonary:      Effort: Pulmonary effort is normal. No respiratory distress.      Breath sounds: Normal breath sounds. No wheezing, rhonchi or rales.   Abdominal:      General: Abdomen is flat. Bowel sounds are normal. There is no distension.      Palpations: Abdomen is soft. There is no mass.      Tenderness: There is no abdominal tenderness. There is no guarding or rebound.      Hernia: No hernia is present.   Musculoskeletal:         General: No swelling. Normal range of motion.      Cervical back: Normal range of motion and neck supple.      Right lower leg: No edema.      Left lower leg: No edema.   Skin:     General: Skin is warm and dry.   Neurological:      Mental Status: She is alert and oriented to person, place, and time.   Psychiatric:         Attention and Perception: Attention normal.         Mood and Affect: Mood normal.         Speech: Speech normal.         Behavior: Behavior normal. Behavior is cooperative.         Thought Content: Thought content normal.         Assessment    Due to the patient's symptoms and previous esophagram-I will order a video swallow with speech therapy to be performed.  At this time I do not feel as though she would benefit from having a EGD performed.  Her difficulty swallowing liquids is mainly coming from the tertiary contractions she is experiencing-I will prescribe Bentyl 10 mg 3-4 times daily to see if this will help relax the esophagus and have to improve swallowing somewhat.   Diagnosis Plan   1. Esophageal dysphagia  dicyclomine (Bentyl) 10 MG capsule    FL Video Swallow With Speech Single Contrast       Return After video swallow.               This document has been electronically signed by Jayant Thakkar PA-C  June 21, 2022 09:37 EDT    Part of this note may be an electronic transcription/translation of spoken language to printed text using the Dragon Dictation System.

## 2022-07-01 ENCOUNTER — HOSPITAL ENCOUNTER (OUTPATIENT)
Dept: GENERAL RADIOLOGY | Facility: HOSPITAL | Age: 82
Discharge: HOME | End: 2022-07-01
Admitting: PHYSICIAN ASSISTANT

## 2022-07-01 DIAGNOSIS — R13.19 ESOPHAGEAL DYSPHAGIA: ICD-10-CM

## 2022-07-01 PROCEDURE — 74230 X-RAY XM SWLNG FUNCJ C+: CPT

## 2022-07-01 PROCEDURE — 74230 X-RAY XM SWLNG FUNCJ C+: CPT | Performed by: RADIOLOGY

## 2022-07-01 PROCEDURE — 92611 MOTION FLUOROSCOPY/SWALLOW: CPT

## 2022-07-01 NOTE — MBS/VFSS/FEES
Outpatient - Speech Language Pathology   Swallow Initial Evaluation SHAGGY Nelson   OUTPATIENT MODIFIED BARIUM SWALLOW STUDY     Patient Name: Tila Cleveland  : 1940  MRN: [id number]  Today's Date: 2022             Admit Date: 2022    Visit Dx:     ICD-10-CM ICD-9-CM   1. Esophageal dysphagia  R13.19 787.29     Patient Active Problem List   Diagnosis   • Left lower lobe pulmonary nodule     Past Medical History:   Diagnosis Date   • Disease of thyroid gland    • Hypertension    • Hypothyroidism    • Lung nodule      Past Surgical History:   Procedure Laterality Date   • CORONARY ANGIOPLASTY WITH STENT PLACEMENT     • THYROIDECTOMY, PARTIAL       Tila Cleveland  presents to the radiology suite this am from a private residence to participate in an instrumental MBS to assess safety/efficacy of swallowing fnx, determine safest/least restrictive diet. She is accompanied by her grandson, whom remains in the lobby during this evaluation.     Ms. Cleveland presents today w/ c/o overt s/s aspiration w/ po intake, particularly thin liquids. She reports she has been thickening her liquids at home per guidance from a family member, however, she is uncertain of the thickness level/texture. She is s/p esophagram 22 w/ noted silent aspiration during the procedure. She denies any recent h/o PNA or bronchitis.     No recent chest xray available for review.     Pt is observed on ra w/o complications.     Risks and benefits of the procedure are explained w/ pt verbalizing understanding/agreement to participate. Proceed per protocol.     Pt is positioned upright and centered in a chair to accept multiple po presentations of solid cracker, puree, honey thick, nectar thick, and thin liquids via spoon, cup and straw, along w/ whole placebo pill in puree. She is able to self feed.     All views are from the lateral plane.     Facial/oral structures are symmetrical upon observation w/o lingual deviation upon protrusion.  Noted poor dentition. Oral mucosa are moist, pink and clean. Secretions are clear, thin and well controlled. OROM/RHONDA is wfl to imitate oral postures. Gag is not assessed. Volitional cough is adequate in intensity, clear in quality, nonproductive. Vocal quality is adequate in intensity, clear in quality w/ intelligible speech. Pt is a/a and cooperative to particpate. She is oriented to person, place, and time, follows simple directives, and participates in simple conversational exchanges.     Upon po presentations, adequate bolus anticipation w/ good labial seal for bolus clearance via spoon bowl, cup rim stability and suction via straw. Bolus formation, manipulation, and control are wfl w/ rotary mastication pattern. A-p transit is timely w/o oral residue. Tongue base retraction and linguavelar seal are adequate w/o significant premature spillage. No laryngeal penetration or aspiration is evidenced before the swallow.     Pharyngeal swallow is timely w/ mildly weak hyolaryngeal elevation and slightly delayed epiglottic inversion. Laryngeal penetration and silent aspiration occurs during the swallow w/ thin liquid via all presentation styles. Compensatory chin tuck is ineffective to alleviate this. Cued cough is effective to diminish but not completely clear laryngeal residue and aspirated material. Pharyngeal contraction is generally weak w/ moderate diffuse pharyngeal residue w/ all consistencies, diminishing w/ thinner viscosities and spontaneous secondary swallow. W/ nectar thick liquid via straw trial only, laryngeal penetration and silent aspiration occurs during the secondary swallow. This is not reproduced w/ multiple trials of nectar thick liquid via cup. No other laryngeal penetration or aspiration evidenced during or after the swallow.     She is able to manipulate and swallow whole placebo pill in puree w/o difficulty.     Full esophageal sweep reveals no obvious mucosal abnormalities. Motility adequate  across this evaluation. Placebo pill does delay in transit at the midesophagus, however, it does pass. No retrograde flow noted.      Impression: Per this evaluation, Ms. Cleveland presents w/ a wfl oral phase, mild-mod pharyngeal dysphagia w/ silent aspiration of thin liquids, nectar thick liquid via straw trial only. She is felt to most benefit from least restrictive modified po diet of regular consistency, NECTAR thick liquids, water protocol between meals/medications, and formal dysphagia tx. She does request home health services for dysphagia tx.     SLP Recommendation and Plan  1. Regular consistency diet, NECTAR thick liquids.   2. Water protocol between meals/medications.   3. Meds whole in puree/nectars.   4. Universal aspiration precautions.   5. DEREJE precautions.   6. Oral care protocol.   7. Dysphagia tx via home health.     D/w pt and pt's grandson at length results and recommendations w/ verbal understanding and agreement. Provided nectar thickening agent samples, educational/instructional handouts, modified diet training.     Thank you for allowing me to participate in the care of your patient-  Tara Varghese M.A., CCC-SLP    EDUCATION  The patient has been educated in the following areas:   Dysphagia (Swallowing Impairment) Oral Care/Hydration Modified Diet Instruction.      Time Calculation:     Therapy Charges for Today     Code Description Service Date Service Provider Modifiers Qty    [id number] HC ST MOTION FLUORO EVAL SWALLOW 8 7/1/2022 Tara Varghese MA,CCC-SLP GN 1        Tara Varghese MA,CCC-SLP  7/1/2022

## 2022-08-09 ENCOUNTER — HOSPITAL ENCOUNTER (INPATIENT)
Facility: HOSPITAL | Age: 82
LOS: 1 days | Discharge: ANOTHER HOSPITAL | End: 2022-08-10
Attending: EMERGENCY MEDICINE | Admitting: INTERNAL MEDICINE

## 2022-08-09 ENCOUNTER — APPOINTMENT (OUTPATIENT)
Dept: GENERAL RADIOLOGY | Facility: HOSPITAL | Age: 82
End: 2022-08-09

## 2022-08-09 ENCOUNTER — HOSPITAL ENCOUNTER (OUTPATIENT)
Facility: HOSPITAL | Age: 82
Setting detail: HOSPITAL OUTPATIENT SURGERY
End: 2022-08-09
Attending: INTERNAL MEDICINE | Admitting: INTERNAL MEDICINE

## 2022-08-09 DIAGNOSIS — I21.3 ST ELEVATION MYOCARDIAL INFARCTION (STEMI), UNSPECIFIED ARTERY: Primary | ICD-10-CM

## 2022-08-09 LAB
ALBUMIN SERPL-MCNC: 3.68 G/DL (ref 3.5–5.2)
ALBUMIN/GLOB SERPL: 1.2 G/DL
ALP SERPL-CCNC: 88 U/L (ref 39–117)
ALT SERPL W P-5'-P-CCNC: 8 U/L (ref 1–33)
ANION GAP SERPL CALCULATED.3IONS-SCNC: 12.6 MMOL/L (ref 5–15)
APTT PPP: >100 SECONDS (ref 26.5–34.5)
AST SERPL-CCNC: 26 U/L (ref 1–32)
BASOPHILS # BLD AUTO: 0.05 10*3/MM3 (ref 0–0.2)
BASOPHILS NFR BLD AUTO: 0.6 % (ref 0–1.5)
BILIRUB SERPL-MCNC: 0.3 MG/DL (ref 0–1.2)
BUN SERPL-MCNC: 17 MG/DL (ref 8–23)
BUN/CREAT SERPL: 19.1 (ref 7–25)
CALCIUM SPEC-SCNC: 9.3 MG/DL (ref 8.6–10.5)
CHLORIDE SERPL-SCNC: 101 MMOL/L (ref 98–107)
CO2 SERPL-SCNC: 23.4 MMOL/L (ref 22–29)
CREAT SERPL-MCNC: 0.89 MG/DL (ref 0.57–1)
DEPRECATED RDW RBC AUTO: 46.9 FL (ref 37–54)
EGFRCR SERPLBLD CKD-EPI 2021: 64.4 ML/MIN/1.73
EOSINOPHIL # BLD AUTO: 0.14 10*3/MM3 (ref 0–0.4)
EOSINOPHIL NFR BLD AUTO: 1.7 % (ref 0.3–6.2)
ERYTHROCYTE [DISTWIDTH] IN BLOOD BY AUTOMATED COUNT: 12.5 % (ref 12.3–15.4)
FLUAV RNA RESP QL NAA+PROBE: NOT DETECTED
FLUBV RNA RESP QL NAA+PROBE: NOT DETECTED
GLOBULIN UR ELPH-MCNC: 3 GM/DL
GLUCOSE SERPL-MCNC: 164 MG/DL (ref 65–99)
HCT VFR BLD AUTO: 41.4 % (ref 34–46.6)
HGB BLD-MCNC: 13.7 G/DL (ref 12–15.9)
HOLD SPECIMEN: NORMAL
HOLD SPECIMEN: NORMAL
IMM GRANULOCYTES # BLD AUTO: 0.02 10*3/MM3 (ref 0–0.05)
IMM GRANULOCYTES NFR BLD AUTO: 0.2 % (ref 0–0.5)
INR PPP: 1.01 (ref 0.9–1.1)
LYMPHOCYTES # BLD AUTO: 3.7 10*3/MM3 (ref 0.7–3.1)
LYMPHOCYTES NFR BLD AUTO: 44.2 % (ref 19.6–45.3)
MAGNESIUM SERPL-MCNC: 2.2 MG/DL (ref 1.6–2.4)
MCH RBC QN AUTO: 33.5 PG (ref 26.6–33)
MCHC RBC AUTO-ENTMCNC: 33.1 G/DL (ref 31.5–35.7)
MCV RBC AUTO: 101.2 FL (ref 79–97)
MONOCYTES # BLD AUTO: 0.6 10*3/MM3 (ref 0.1–0.9)
MONOCYTES NFR BLD AUTO: 7.2 % (ref 5–12)
NEUTROPHILS NFR BLD AUTO: 3.86 10*3/MM3 (ref 1.7–7)
NEUTROPHILS NFR BLD AUTO: 46.1 % (ref 42.7–76)
NRBC BLD AUTO-RTO: 0 /100 WBC (ref 0–0.2)
PLATELET # BLD AUTO: 184 10*3/MM3 (ref 140–450)
PMV BLD AUTO: 9.6 FL (ref 6–12)
POTASSIUM SERPL-SCNC: 4.2 MMOL/L (ref 3.5–5.2)
PROT SERPL-MCNC: 6.7 G/DL (ref 6–8.5)
PROTHROMBIN TIME: 13.6 SECONDS (ref 12.1–14.7)
QT INTERVAL: 526 MS
QTC INTERVAL: 433 MS
RBC # BLD AUTO: 4.09 10*6/MM3 (ref 3.77–5.28)
SARS-COV-2 RNA RESP QL NAA+PROBE: NOT DETECTED
SODIUM SERPL-SCNC: 137 MMOL/L (ref 136–145)
TROPONIN T SERPL-MCNC: 0.03 NG/ML (ref 0–0.03)
WBC NRBC COR # BLD: 8.37 10*3/MM3 (ref 3.4–10.8)
WHOLE BLOOD HOLD COAG: NORMAL
WHOLE BLOOD HOLD SPECIMEN: NORMAL

## 2022-08-09 PROCEDURE — 87636 SARSCOV2 & INF A&B AMP PRB: CPT | Performed by: EMERGENCY MEDICINE

## 2022-08-09 PROCEDURE — 85025 COMPLETE CBC W/AUTO DIFF WBC: CPT | Performed by: EMERGENCY MEDICINE

## 2022-08-09 PROCEDURE — C1874 STENT, COATED/COV W/DEL SYS: HCPCS | Performed by: INTERNAL MEDICINE

## 2022-08-09 PROCEDURE — C1887 CATHETER, GUIDING: HCPCS | Performed by: INTERNAL MEDICINE

## 2022-08-09 PROCEDURE — 80053 COMPREHEN METABOLIC PANEL: CPT | Performed by: EMERGENCY MEDICINE

## 2022-08-09 PROCEDURE — 25010000002 FENTANYL CITRATE (PF) 50 MCG/ML SOLUTION: Performed by: INTERNAL MEDICINE

## 2022-08-09 PROCEDURE — C1769 GUIDE WIRE: HCPCS | Performed by: INTERNAL MEDICINE

## 2022-08-09 PROCEDURE — 94761 N-INVAS EAR/PLS OXIMETRY MLT: CPT

## 2022-08-09 PROCEDURE — 85730 THROMBOPLASTIN TIME PARTIAL: CPT | Performed by: INTERNAL MEDICINE

## 2022-08-09 PROCEDURE — 4A023N7 MEASUREMENT OF CARDIAC SAMPLING AND PRESSURE, LEFT HEART, PERCUTANEOUS APPROACH: ICD-10-PCS | Performed by: INTERNAL MEDICINE

## 2022-08-09 PROCEDURE — C1725 CATH, TRANSLUMIN NON-LASER: HCPCS | Performed by: INTERNAL MEDICINE

## 2022-08-09 PROCEDURE — 92941 PRQ TRLML REVSC TOT OCCL AMI: CPT | Performed by: INTERNAL MEDICINE

## 2022-08-09 PROCEDURE — 83735 ASSAY OF MAGNESIUM: CPT | Performed by: EMERGENCY MEDICINE

## 2022-08-09 PROCEDURE — 99152 MOD SED SAME PHYS/QHP 5/>YRS: CPT | Performed by: INTERNAL MEDICINE

## 2022-08-09 PROCEDURE — 93458 L HRT ARTERY/VENTRICLE ANGIO: CPT | Performed by: INTERNAL MEDICINE

## 2022-08-09 PROCEDURE — 0 IOPAMIDOL PER 1 ML: Performed by: INTERNAL MEDICINE

## 2022-08-09 PROCEDURE — 25010000002 HEPARIN (PORCINE) PER 1000 UNITS: Performed by: INTERNAL MEDICINE

## 2022-08-09 PROCEDURE — 99223 1ST HOSP IP/OBS HIGH 75: CPT | Performed by: INTERNAL MEDICINE

## 2022-08-09 PROCEDURE — 84484 ASSAY OF TROPONIN QUANT: CPT | Performed by: EMERGENCY MEDICINE

## 2022-08-09 PROCEDURE — 02703ZZ DILATION OF CORONARY ARTERY, ONE ARTERY, PERCUTANEOUS APPROACH: ICD-10-PCS | Performed by: INTERNAL MEDICINE

## 2022-08-09 PROCEDURE — 99284 EMERGENCY DEPT VISIT MOD MDM: CPT

## 2022-08-09 PROCEDURE — B2111ZZ FLUOROSCOPY OF MULTIPLE CORONARY ARTERIES USING LOW OSMOLAR CONTRAST: ICD-10-PCS | Performed by: INTERNAL MEDICINE

## 2022-08-09 PROCEDURE — 71045 X-RAY EXAM CHEST 1 VIEW: CPT

## 2022-08-09 PROCEDURE — 25010000002 ATROPINE PER 0.01 MG: Performed by: INTERNAL MEDICINE

## 2022-08-09 PROCEDURE — 94799 UNLISTED PULMONARY SVC/PX: CPT

## 2022-08-09 PROCEDURE — 25010000002 PHENYLEPHRINE 10 MG/ML SOLUTION: Performed by: INTERNAL MEDICINE

## 2022-08-09 PROCEDURE — C1894 INTRO/SHEATH, NON-LASER: HCPCS | Performed by: INTERNAL MEDICINE

## 2022-08-09 PROCEDURE — B2151ZZ FLUOROSCOPY OF LEFT HEART USING LOW OSMOLAR CONTRAST: ICD-10-PCS | Performed by: INTERNAL MEDICINE

## 2022-08-09 PROCEDURE — 99153 MOD SED SAME PHYS/QHP EA: CPT | Performed by: INTERNAL MEDICINE

## 2022-08-09 PROCEDURE — 25010000002 MIDAZOLAM PER 1 MG: Performed by: INTERNAL MEDICINE

## 2022-08-09 PROCEDURE — 93005 ELECTROCARDIOGRAM TRACING: CPT | Performed by: EMERGENCY MEDICINE

## 2022-08-09 PROCEDURE — 85610 PROTHROMBIN TIME: CPT | Performed by: EMERGENCY MEDICINE

## 2022-08-09 RX ORDER — MIDAZOLAM HYDROCHLORIDE 1 MG/ML
INJECTION INTRAMUSCULAR; INTRAVENOUS AS NEEDED
Status: DISCONTINUED | OUTPATIENT
Start: 2022-08-09 | End: 2022-08-09 | Stop reason: HOSPADM

## 2022-08-09 RX ORDER — ACETAMINOPHEN 325 MG/1
650 TABLET ORAL EVERY 4 HOURS PRN
Status: DISCONTINUED | OUTPATIENT
Start: 2022-08-09 | End: 2022-08-10 | Stop reason: HOSPADM

## 2022-08-09 RX ORDER — SODIUM CHLORIDE 0.9 % (FLUSH) 0.9 %
10 SYRINGE (ML) INJECTION EVERY 12 HOURS SCHEDULED
Status: DISCONTINUED | OUTPATIENT
Start: 2022-08-09 | End: 2022-08-10 | Stop reason: HOSPADM

## 2022-08-09 RX ORDER — FENTANYL CITRATE 50 UG/ML
INJECTION, SOLUTION INTRAMUSCULAR; INTRAVENOUS AS NEEDED
Status: DISCONTINUED | OUTPATIENT
Start: 2022-08-09 | End: 2022-08-09 | Stop reason: HOSPADM

## 2022-08-09 RX ORDER — HYDROCODONE BITARTRATE AND ACETAMINOPHEN 10; 325 MG/1; MG/1
1 TABLET ORAL EVERY 8 HOURS PRN
Status: DISCONTINUED | OUTPATIENT
Start: 2022-08-09 | End: 2022-08-10 | Stop reason: HOSPADM

## 2022-08-09 RX ORDER — LEVOTHYROXINE SODIUM 0.07 MG/1
75 TABLET ORAL
Status: DISCONTINUED | OUTPATIENT
Start: 2022-08-10 | End: 2022-08-10 | Stop reason: HOSPADM

## 2022-08-09 RX ORDER — HEPARIN SODIUM 5000 [USP'U]/ML
60 INJECTION, SOLUTION INTRAVENOUS; SUBCUTANEOUS ONCE
Status: DISCONTINUED | OUTPATIENT
Start: 2022-08-09 | End: 2022-08-10 | Stop reason: HOSPADM

## 2022-08-09 RX ORDER — VERAPAMIL HYDROCHLORIDE 2.5 MG/ML
INJECTION, SOLUTION INTRAVENOUS AS NEEDED
Status: DISCONTINUED | OUTPATIENT
Start: 2022-08-09 | End: 2022-08-09 | Stop reason: HOSPADM

## 2022-08-09 RX ORDER — DEXTROMETHORPHAN POLISTIREX 30 MG/5ML
30 SUSPENSION ORAL EVERY 12 HOURS PRN
COMMUNITY

## 2022-08-09 RX ORDER — SODIUM CHLORIDE 0.9 % (FLUSH) 0.9 %
10 SYRINGE (ML) INJECTION AS NEEDED
Status: DISCONTINUED | OUTPATIENT
Start: 2022-08-09 | End: 2022-08-10 | Stop reason: HOSPADM

## 2022-08-09 RX ORDER — ATORVASTATIN CALCIUM 40 MG/1
40 TABLET, FILM COATED ORAL NIGHTLY
Status: DISCONTINUED | OUTPATIENT
Start: 2022-08-09 | End: 2022-08-10 | Stop reason: HOSPADM

## 2022-08-09 RX ORDER — HEPARIN SODIUM 1000 [USP'U]/ML
INJECTION, SOLUTION INTRAVENOUS; SUBCUTANEOUS AS NEEDED
Status: DISCONTINUED | OUTPATIENT
Start: 2022-08-09 | End: 2022-08-09 | Stop reason: HOSPADM

## 2022-08-09 RX ORDER — ASPIRIN 81 MG/1
81 TABLET ORAL DAILY
Status: DISCONTINUED | OUTPATIENT
Start: 2022-08-10 | End: 2022-08-10 | Stop reason: HOSPADM

## 2022-08-09 RX ORDER — ALPRAZOLAM 1 MG/1
1 TABLET ORAL NIGHTLY PRN
Status: DISCONTINUED | OUTPATIENT
Start: 2022-08-09 | End: 2022-08-09

## 2022-08-09 RX ORDER — LISINOPRIL 2.5 MG/1
5 TABLET ORAL DAILY
Status: DISCONTINUED | OUTPATIENT
Start: 2022-08-09 | End: 2022-08-10 | Stop reason: HOSPADM

## 2022-08-09 RX ORDER — SODIUM CHLORIDE 9 MG/ML
INJECTION, SOLUTION INTRAVENOUS CONTINUOUS PRN
Status: COMPLETED | OUTPATIENT
Start: 2022-08-09 | End: 2022-08-09

## 2022-08-09 RX ORDER — HEPARIN SODIUM 5000 [USP'U]/ML
60 INJECTION, SOLUTION INTRAVENOUS; SUBCUTANEOUS AS NEEDED
Status: DISCONTINUED | OUTPATIENT
Start: 2022-08-09 | End: 2022-08-10 | Stop reason: HOSPADM

## 2022-08-09 RX ORDER — AMLODIPINE BESYLATE 5 MG/1
5 TABLET ORAL NIGHTLY
COMMUNITY

## 2022-08-09 RX ORDER — ALPRAZOLAM 0.5 MG/1
0.5 TABLET ORAL NIGHTLY PRN
Status: DISCONTINUED | OUTPATIENT
Start: 2022-08-09 | End: 2022-08-10 | Stop reason: HOSPADM

## 2022-08-09 RX ORDER — ATROPINE SULFATE 1 MG/ML
INJECTION, SOLUTION INTRAMUSCULAR; INTRAVENOUS; SUBCUTANEOUS AS NEEDED
Status: DISCONTINUED | OUTPATIENT
Start: 2022-08-09 | End: 2022-08-09 | Stop reason: HOSPADM

## 2022-08-09 RX ORDER — LIDOCAINE HYDROCHLORIDE 20 MG/ML
INJECTION, SOLUTION INFILTRATION; PERINEURAL AS NEEDED
Status: DISCONTINUED | OUTPATIENT
Start: 2022-08-09 | End: 2022-08-09 | Stop reason: HOSPADM

## 2022-08-09 RX ORDER — HEPARIN SOD,PORCINE/0.9 % NACL 25000/250
12 INTRAVENOUS SOLUTION INTRAVENOUS
Status: DISCONTINUED | OUTPATIENT
Start: 2022-08-09 | End: 2022-08-10 | Stop reason: HOSPADM

## 2022-08-09 RX ORDER — AMLODIPINE BESYLATE 5 MG/1
5 TABLET ORAL NIGHTLY
Status: CANCELLED | OUTPATIENT
Start: 2022-08-09

## 2022-08-09 RX ORDER — HEPARIN SODIUM 5000 [USP'U]/ML
30 INJECTION, SOLUTION INTRAVENOUS; SUBCUTANEOUS AS NEEDED
Status: DISCONTINUED | OUTPATIENT
Start: 2022-08-09 | End: 2022-08-10 | Stop reason: HOSPADM

## 2022-08-09 RX ORDER — SODIUM CHLORIDE 9 MG/ML
100 INJECTION, SOLUTION INTRAVENOUS CONTINUOUS
Status: DISCONTINUED | OUTPATIENT
Start: 2022-08-09 | End: 2022-08-10 | Stop reason: HOSPADM

## 2022-08-09 RX ORDER — PHENYLEPHRINE HYDROCHLORIDE 10 MG/ML
INJECTION INTRAVENOUS AS NEEDED
Status: DISCONTINUED | OUTPATIENT
Start: 2022-08-09 | End: 2022-08-09 | Stop reason: HOSPADM

## 2022-08-09 RX ORDER — LEVOTHYROXINE SODIUM 0.07 MG/1
75 TABLET ORAL DAILY
Status: DISCONTINUED | OUTPATIENT
Start: 2022-08-09 | End: 2022-08-09

## 2022-08-09 RX ADMIN — SODIUM CHLORIDE 100 ML/HR: 9 INJECTION, SOLUTION INTRAVENOUS at 17:25

## 2022-08-09 RX ADMIN — ISOSORBIDE DINITRATE 30 MG: 10 TABLET ORAL at 21:01

## 2022-08-09 RX ADMIN — ATORVASTATIN CALCIUM 40 MG: 40 TABLET, FILM COATED ORAL at 21:01

## 2022-08-09 RX ADMIN — Medication 10 ML: at 21:06

## 2022-08-09 RX ADMIN — TICAGRELOR 90 MG: 90 TABLET ORAL at 21:01

## 2022-08-09 NOTE — H&P
Patient Identification:  Name:  Tila Cleveland  Age:  82 y.o.  Sex:  female  :  1940  MRN:  [id number]   Visit Number:  [id number]  Primary Care Physician:  Jodi Guthrie APRN    Chief complaint:   Chest pain  History of presenting illness: Patient is a 82-year-old female with known history of CAD s/p PCI in the remote past, per records and no details available, presented to our emergency room with acute onset of chest pain around 9 AM today.  Her initial EMS field EKG showed inferior STEMI with sinus bradycardia.  On presentation she did have significant ST elevations in the inferior leads with reciprocal changes suggestive of inferior STEMI.  She was complaining of a 7 x 10 chest pain associated with nausea and shortness of breath.  Her heart rate was in the 40s and her blood pressure was 100/70.  Her GCS was normal.    ROS: All systems reviewed and negative except as mentioned above.     ---------------------------------------------------------------------------------------------------------------------   Past Medical History:   Diagnosis Date   • Disease of thyroid gland    • Hypertension    • Hypothyroidism    • Lung nodule      Past Surgical History:   Procedure Laterality Date   • CORONARY ANGIOPLASTY WITH STENT PLACEMENT     • THYROIDECTOMY, PARTIAL       Family History   Problem Relation Age of Onset   • Diabetes Mother    • Stroke Mother    • Lung cancer Father      Social History     Socioeconomic History   • Marital status:    • Number of children: 3   Tobacco Use   • Smoking status: Former Smoker     Packs/day: 1.00     Years: 20.00     Pack years: 20.00     Types: Cigarettes     Quit date:      Years since quittin.6   • Smokeless tobacco: Current User     Types: Snuff   Vaping Use   • Vaping Use: Never used   Substance and Sexual Activity   • Alcohol use: No   • Drug use: No   • Sexual activity: Defer      ---------------------------------------------------------------------------------------------------------------------   Allergies:  Quinolones and Penicillins  ---------------------------------------------------------------------------------------------------------------------   Prior to Admission Medications     Prescriptions Last Dose Informant Patient Reported? Taking?    ALPRAZolam (XANAX) 1 MG tablet  Medication Bottle Yes No    Take 1 mg by mouth At Night As Needed for Sleep.    amLODIPine (NORVASC) 5 MG tablet   No No    Take 1/2 tablet by mouth Daily.    dicyclomine (Bentyl) 10 MG capsule   No No    Take 1 capsule by mouth 4 (Four) Times a Day Before Meals & at Bedtime.    HYDROcodone-acetaminophen (NORCO)  MG per tablet  Medication Bottle Yes No    Take 1 tablet by mouth Every 8 (Eight) Hours As Needed for Moderate Pain .    isosorbide dinitrate (ISORDIL) 30 MG tablet  Medication Bottle Yes No    Take 30 mg by mouth 2 (Two) Times a Day. PTA: Pt has been taking QD due to being unaware prescription was written for BID.    levothyroxine (SYNTHROID, LEVOTHROID) 75 MCG tablet   No No    Take 1 tablet by mouth Daily.    metoprolol succinate XL (TOPROL-XL) 25 MG 24 hr tablet  Medication Bottle Yes No    Take 25 mg by mouth Daily.        Hospital Scheduled Meds:  heparin, 60 Units/kg, Intravenous, Once  ticagrelor, 180 mg, Oral, Once         ---------------------------------------------------------------------------------------------------------------------   Vital Signs:  Temp:  [98 °F (36.7 °C)] 98 °F (36.7 °C)  Heart Rate:  [46] 46  Resp:  [16] 16  BP: (95)/(87) 95/87      08/09/22  1335   Weight: 61.2 kg (135 lb)     Body mass index is 21.79 kg/m².  ---------------------------------------------------------------------------------------------------------------------   Physical Exam:  Constitutional:  Well-developed and well-nourished.  No respiratory distress.      HENT:  Head: Normocephalic and  atraumatic.  Mouth:  Moist mucous membranes.    Eyes:  Conjunctivae and EOM are normal.  Pupils are equal, round, and reactive to light.  No scleral icterus.  Neck:  Neck supple.  No JVD present.    Cardiovascular:  Normal rate, regular rhythm and normal heart sounds with no murmur.  Pulmonary/Chest:  No respiratory distress, no wheezes, no crackles, with normal breath sounds and good air movement.  Abdominal:  Soft.  Bowel sounds are normal.  No distension and no tenderness.   Musculoskeletal:  No edema, no tenderness, and no deformity.  No red or swollen joints anywhere.    Neurological:  Alert and oriented to person, place, and time.  No cranial nerve deficit.  No tongue deviation.  No facial droop.  No slurred speech.   Skin:  Skin is warm and dry.  No rash noted.  No pallor.   Psychiatric:  Normal mood and affect.  Behavior is normal.  Judgment and thought content normal.   Peripheral vascular:  No edema and strong pulses on all 4 extremities.  ---------------------------------------------------------------------------------------------------------------------  EKG: Sinus bradycardia, inferior STEMI acute  Telemetry: Sinus bradycardia this is due to 1 heart block.  I have personally looked at both the EKG and the telemetry strips.  ---------------------------------------------------------------------------------------------------------------------                 Invalid input(s): PROTCrCl cannot be calculated (Patient's most recent lab result is older than the maximum 30 days allowed.).  No results found for: AMMONIA          No results found for: HGBA1C  Lab Results   Component Value Date    TSH 33.180 (H) 04/21/2022    FREET4 0.92 (L) 04/21/2022     No results found for: PREGTESTUR, PREGSERUM, HCG, HCGQUANT  Pain Management Panel     Pain Management Panel Latest Ref Rng & Units 4/21/2022 9/21/2019    CREATININE UR mg/dL - 14.3    AMPHETAMINES SCREEN, URINE Negative Negative -    BARBITURATES SCREEN Negative  Negative -    BENZODIAZEPINE SCREEN, URINE Negative Positive(A) -    BUPRENORPHINEUR Negative Negative -    COCAINE SCREEN, URINE Negative Negative -    METHADONE SCREEN, URINE Negative Negative -    METHAMPHETAMINEUR Negative Negative -        No results found for: BLOODCX  No results found for: URINECX  No results found for: WOUNDCX  No results found for: STOOLCX      ---------------------------------------------------------------------------------------------------------------------  Imaging Results (Last 7 Days)     ** No results found for the last 168 hours. **          I have personally reviewed the radiology images and read the final radiology report.  ---------------------------------------------------------------------------------------------------------------------  Assessment and Plan:   Acute inferior STEMI  Sinus bradycardia  CAD status post prior PCI, remote, no records available.  Severe hypothyroidism per labs from before.  Solitary pulmonary nodule from CT imaging in the past.    Plan:  Patient did receive aspirin in route, recommended to give heparin and Brilinta bolus per ACS protocol.  We will proceed with emergent coronary angiogram for her inferior STEMI.  I did briefly discussed the procedure with the patient and she verbalized understanding and agreeable.  I have explained the risks associated with the procedure to the patient including but not limited to an allergic reaction to the contrast material or medications used during the procedure bleeding, infection, and bruising at the catheter insertion site blood clots, which may trigger heart attack, stroke,   damage to the artery where the catheter was inserted, or damage to the arteries as the catheter travels through your body, irregular heart rhythm arrhythmias, kidney damage caused by the contrast material.                lAlen Jimenes MD, [facility]  Interventional Cardiology      08/09/22  13:43 EDT

## 2022-08-09 NOTE — ED NOTES
Patient being transported to cath lab at this time. Patient placed on zoll monitor, 2 liters nc. Patients belongings sent with patient

## 2022-08-09 NOTE — ED NOTES
[facility] Gave to Dr. Shields at 1321 and advised to activate one push. One push activated at 1322.

## 2022-08-09 NOTE — PLAN OF CARE
Goal Outcome Evaluation:              Outcome Evaluation: Patient admitted to PCU from cath lab. Pending transfer to [place] for futher cardiac intervention. TR band in place R radial. Small amount of bleeding noted to TR band site after air removal, MD aware. Bleeding also noted from peripheral IV insertion sites. VSS at this time. Patient remains on RA. Granddaughter at bedside, attentive to patient. Diet advanced, family states patient drinks nectar thick liquids. Safety maintained, bed in low position, call light in reach. Will continue to monitor.

## 2022-08-09 NOTE — ED PROVIDER NOTES
Subjective   Patient is an 82-year-old female who presents with a chief complaint of nonradiating substernal chest pain.  This started sometime earlier this morning and got much worse just prior to calling the ambulance.  She feels short of breath, has had some diaphoresis.  Denies nausea, vomiting, syncope, other symptoms or other complaints.  She does have a history of coronary artery disease, reports that she had a stent placed about 20 years ago.          Review of Systems   All other systems reviewed and are negative.      Past Medical History:   Diagnosis Date   • Disease of thyroid gland    • Hypertension    • Hypothyroidism    • Lung nodule        Allergies   Allergen Reactions   • Quinolones Other (See Comments)     Can not recall reaction but she had to go to the hospital.   • Penicillins Rash       Past Surgical History:   Procedure Laterality Date   • CORONARY ANGIOPLASTY WITH STENT PLACEMENT     • THYROIDECTOMY, PARTIAL         Family History   Problem Relation Age of Onset   • Diabetes Mother    • Stroke Mother    • Lung cancer Father        Social History     Socioeconomic History   • Marital status:    • Number of children: 3   Tobacco Use   • Smoking status: Former Smoker     Packs/day: 1.00     Years: 20.00     Pack years: 20.00     Types: Cigarettes     Quit date:      Years since quittin.6   • Smokeless tobacco: Current User     Types: Snuff   Vaping Use   • Vaping Use: Never used   Substance and Sexual Activity   • Alcohol use: No   • Drug use: No   • Sexual activity: Defer           Objective   Physical Exam  Vitals and nursing note reviewed.   Constitutional:       Appearance: Normal appearance. She is well-developed. She is ill-appearing. She is not diaphoretic.      Comments: Elderly female, appears acutely ill.  Skin is pale, cool, dry.   HENT:      Head: Normocephalic and atraumatic.   Eyes:      General: No scleral icterus.     Pupils: Pupils are equal, round, and reactive  to light.   Neck:      Trachea: No tracheal deviation.   Cardiovascular:      Rate and Rhythm: Normal rate and regular rhythm.   Pulmonary:      Effort: Pulmonary effort is normal. No respiratory distress.      Breath sounds: Normal breath sounds.   Chest:      Chest wall: No tenderness.   Abdominal:      General: Bowel sounds are normal.      Palpations: Abdomen is soft.      Tenderness: There is no abdominal tenderness. There is no guarding or rebound.   Musculoskeletal:         General: No tenderness. Normal range of motion.      Cervical back: Normal range of motion and neck supple. No rigidity or tenderness.      Right lower leg: No tenderness.      Left lower leg: No tenderness.   Skin:     General: Skin is dry.      Capillary Refill: Capillary refill takes less than 2 seconds.   Neurological:      General: No focal deficit present.      Mental Status: She is alert and oriented to person, place, and time.      GCS: GCS eye subscore is 4. GCS verbal subscore is 5. GCS motor subscore is 6.      Motor: No abnormal muscle tone.   Psychiatric:         Behavior: Behavior normal.         Procedures           ED Course  ED Course as of 08/09/22 1405   Tue Aug 09, 2022   1334 1 push was activated at 1322 upon receipt of EKG from EMS.  EMS administered 324 mg of aspirin and 1 sublingual nitroglycerin.  Dr. Julien is at bedside. [CM]   1337 EKG performed at 1335 shows sinus bradycardia, rate 41.  MI interval 360, QRS duration 110, QTc 433 ms.  Consistent with acute inferior wall STEMI with posterior extension.  Patient to Cath Lab with Dr. Julien. [CM]      ED Course User Index  [CM] Garrett Shields MD                                           [facility]    Final diagnoses:   ST elevation myocardial infarction (STEMI), unspecified artery (HCC)       ED Disposition  ED Disposition     ED Disposition   Send to Cath Lab    Condition   --    Comment   --             Please note that portions of this note were completed with a  voice recognition program.            Garrett Shields MD  08/09/22 4545

## 2022-08-10 ENCOUNTER — APPOINTMENT (OUTPATIENT)
Dept: CARDIOLOGY | Facility: HOSPITAL | Age: 82
End: 2022-08-10

## 2022-08-10 ENCOUNTER — HOSPITAL ENCOUNTER (INPATIENT)
Facility: HOSPITAL | Age: 82
LOS: 2 days | Discharge: HOME | End: 2022-08-12
Attending: INTERNAL MEDICINE | Admitting: INTERNAL MEDICINE

## 2022-08-10 VITALS
BODY MASS INDEX: 22.15 KG/M2 | WEIGHT: 141.09 LBS | HEIGHT: 67 IN | SYSTOLIC BLOOD PRESSURE: 132 MMHG | RESPIRATION RATE: 16 BRPM | HEART RATE: 63 BPM | DIASTOLIC BLOOD PRESSURE: 71 MMHG | OXYGEN SATURATION: 95 % | TEMPERATURE: 98.4 F

## 2022-08-10 DIAGNOSIS — I25.700 CORONARY ARTERY DISEASE INVOLVING CORONARY BYPASS GRAFT OF NATIVE HEART WITH UNSTABLE ANGINA PECTORIS: Primary | ICD-10-CM

## 2022-08-10 DIAGNOSIS — R13.12 OROPHARYNGEAL DYSPHAGIA: ICD-10-CM

## 2022-08-10 PROBLEM — I21.3 STEMI (ST ELEVATION MYOCARDIAL INFARCTION) (HCC): Status: ACTIVE | Noted: 2022-08-10

## 2022-08-10 LAB
ACT BLD: 138 SECONDS (ref 82–152)
ACT BLD: 202 SECONDS (ref 82–152)
ANION GAP SERPL CALCULATED.3IONS-SCNC: 11.4 MMOL/L (ref 5–15)
APTT PPP: 26.8 SECONDS (ref 26.5–34.5)
APTT PPP: 46.8 SECONDS (ref 26.5–34.5)
BASOPHILS # BLD AUTO: 0.03 10*3/MM3 (ref 0–0.2)
BASOPHILS NFR BLD AUTO: 0.5 % (ref 0–1.5)
BUN SERPL-MCNC: 15 MG/DL (ref 8–23)
BUN/CREAT SERPL: 19.2 (ref 7–25)
CALCIUM SPEC-SCNC: 9.2 MG/DL (ref 8.6–10.5)
CHLORIDE SERPL-SCNC: 103 MMOL/L (ref 98–107)
CHOLEST SERPL-MCNC: 139 MG/DL (ref 0–200)
CO2 SERPL-SCNC: 22.6 MMOL/L (ref 22–29)
CREAT SERPL-MCNC: 0.78 MG/DL (ref 0.57–1)
DEPRECATED RDW RBC AUTO: 46.5 FL (ref 37–54)
EGFRCR SERPLBLD CKD-EPI 2021: 75.5 ML/MIN/1.73
EOSINOPHIL # BLD AUTO: 0.06 10*3/MM3 (ref 0–0.4)
EOSINOPHIL NFR BLD AUTO: 1 % (ref 0.3–6.2)
ERYTHROCYTE [DISTWIDTH] IN BLOOD BY AUTOMATED COUNT: 12.4 % (ref 12.3–15.4)
GLUCOSE SERPL-MCNC: 108 MG/DL (ref 65–99)
HBA1C MFR BLD: 5.2 % (ref 4.8–5.6)
HCT VFR BLD AUTO: 38.6 % (ref 34–46.6)
HDLC SERPL-MCNC: 41 MG/DL (ref 40–60)
HGB BLD-MCNC: 12.6 G/DL (ref 12–15.9)
IMM GRANULOCYTES # BLD AUTO: 0.02 10*3/MM3 (ref 0–0.05)
IMM GRANULOCYTES NFR BLD AUTO: 0.3 % (ref 0–0.5)
LDLC SERPL CALC-MCNC: 65 MG/DL (ref 0–100)
LDLC/HDLC SERPL: 1.41 {RATIO}
LYMPHOCYTES # BLD AUTO: 1.24 10*3/MM3 (ref 0.7–3.1)
LYMPHOCYTES NFR BLD AUTO: 19.7 % (ref 19.6–45.3)
MCH RBC QN AUTO: 32.7 PG (ref 26.6–33)
MCHC RBC AUTO-ENTMCNC: 32.6 G/DL (ref 31.5–35.7)
MCV RBC AUTO: 100.3 FL (ref 79–97)
MONOCYTES # BLD AUTO: 0.63 10*3/MM3 (ref 0.1–0.9)
MONOCYTES NFR BLD AUTO: 10 % (ref 5–12)
NEUTROPHILS NFR BLD AUTO: 4.31 10*3/MM3 (ref 1.7–7)
NEUTROPHILS NFR BLD AUTO: 68.5 % (ref 42.7–76)
NRBC BLD AUTO-RTO: 0 /100 WBC (ref 0–0.2)
PLATELET # BLD AUTO: 151 10*3/MM3 (ref 140–450)
PMV BLD AUTO: 9.7 FL (ref 6–12)
POTASSIUM SERPL-SCNC: 4.2 MMOL/L (ref 3.5–5.2)
RBC # BLD AUTO: 3.85 10*6/MM3 (ref 3.77–5.28)
SODIUM SERPL-SCNC: 137 MMOL/L (ref 136–145)
TRIGL SERPL-MCNC: 200 MG/DL (ref 0–150)
VLDLC SERPL-MCNC: 33 MG/DL (ref 5–40)
WBC NRBC COR # BLD: 6.29 10*3/MM3 (ref 3.4–10.8)

## 2022-08-10 PROCEDURE — 25010000002 HEPARIN (PORCINE) PER 1000 UNITS: Performed by: INTERNAL MEDICINE

## 2022-08-10 PROCEDURE — 85347 COAGULATION TIME ACTIVATED: CPT

## 2022-08-10 PROCEDURE — C1887 CATHETER, GUIDING: HCPCS | Performed by: INTERNAL MEDICINE

## 2022-08-10 PROCEDURE — 027035Z DILATION OF CORONARY ARTERY, ONE ARTERY WITH TWO DRUG-ELUTING INTRALUMINAL DEVICES, PERCUTANEOUS APPROACH: ICD-10-PCS | Performed by: INTERNAL MEDICINE

## 2022-08-10 PROCEDURE — 99239 HOSP IP/OBS DSCHRG MGMT >30: CPT | Performed by: INTERNAL MEDICINE

## 2022-08-10 PROCEDURE — 99223 1ST HOSP IP/OBS HIGH 75: CPT | Performed by: INTERNAL MEDICINE

## 2022-08-10 PROCEDURE — 93454 CORONARY ARTERY ANGIO S&I: CPT | Performed by: INTERNAL MEDICINE

## 2022-08-10 PROCEDURE — C1894 INTRO/SHEATH, NON-LASER: HCPCS | Performed by: INTERNAL MEDICINE

## 2022-08-10 PROCEDURE — C1874 STENT, COATED/COV W/DEL SYS: HCPCS | Performed by: INTERNAL MEDICINE

## 2022-08-10 PROCEDURE — C9602 PERC D-E COR STENT ATHER S: HCPCS | Performed by: INTERNAL MEDICINE

## 2022-08-10 PROCEDURE — 93005 ELECTROCARDIOGRAM TRACING: CPT | Performed by: INTERNAL MEDICINE

## 2022-08-10 PROCEDURE — C1725 CATH, TRANSLUMIN NON-LASER: HCPCS | Performed by: INTERNAL MEDICINE

## 2022-08-10 PROCEDURE — 4A023N7 MEASUREMENT OF CARDIAC SAMPLING AND PRESSURE, LEFT HEART, PERCUTANEOUS APPROACH: ICD-10-PCS | Performed by: INTERNAL MEDICINE

## 2022-08-10 PROCEDURE — C1769 GUIDE WIRE: HCPCS | Performed by: INTERNAL MEDICINE

## 2022-08-10 PROCEDURE — C1724 CATH, TRANS ATHEREC,ROTATION: HCPCS | Performed by: INTERNAL MEDICINE

## 2022-08-10 PROCEDURE — 93010 ELECTROCARDIOGRAM REPORT: CPT | Performed by: INTERNAL MEDICINE

## 2022-08-10 PROCEDURE — 80061 LIPID PANEL: CPT | Performed by: INTERNAL MEDICINE

## 2022-08-10 PROCEDURE — 25010000002 FENTANYL CITRATE (PF) 50 MCG/ML SOLUTION: Performed by: INTERNAL MEDICINE

## 2022-08-10 PROCEDURE — 85025 COMPLETE CBC W/AUTO DIFF WBC: CPT | Performed by: INTERNAL MEDICINE

## 2022-08-10 PROCEDURE — B2151ZZ FLUOROSCOPY OF LEFT HEART USING LOW OSMOLAR CONTRAST: ICD-10-PCS | Performed by: INTERNAL MEDICINE

## 2022-08-10 PROCEDURE — 0 IOPAMIDOL PER 1 ML: Performed by: INTERNAL MEDICINE

## 2022-08-10 PROCEDURE — C1760 CLOSURE DEV, VASC: HCPCS | Performed by: INTERNAL MEDICINE

## 2022-08-10 PROCEDURE — 92978 ENDOLUMINL IVUS OCT C 1ST: CPT | Performed by: INTERNAL MEDICINE

## 2022-08-10 PROCEDURE — B2111ZZ FLUOROSCOPY OF MULTIPLE CORONARY ARTERIES USING LOW OSMOLAR CONTRAST: ICD-10-PCS | Performed by: INTERNAL MEDICINE

## 2022-08-10 PROCEDURE — 83036 HEMOGLOBIN GLYCOSYLATED A1C: CPT | Performed by: INTERNAL MEDICINE

## 2022-08-10 PROCEDURE — 85730 THROMBOPLASTIN TIME PARTIAL: CPT | Performed by: INTERNAL MEDICINE

## 2022-08-10 PROCEDURE — C1753 CATH, INTRAVAS ULTRASOUND: HCPCS | Performed by: INTERNAL MEDICINE

## 2022-08-10 PROCEDURE — 25010000002 MIDAZOLAM PER 1 MG: Performed by: INTERNAL MEDICINE

## 2022-08-10 PROCEDURE — 92933 PRQ TRLML C ATHRC ST ANGIOP1: CPT | Performed by: INTERNAL MEDICINE

## 2022-08-10 PROCEDURE — 80048 BASIC METABOLIC PNL TOTAL CA: CPT | Performed by: INTERNAL MEDICINE

## 2022-08-10 DEVICE — EVEROLIMUS-ELUTING PLATINUM CHROMIUM CORONARY STENT SYSTEM
Type: IMPLANTABLE DEVICE | Status: FUNCTIONAL
Brand: SYNERGY™ XD

## 2022-08-10 RX ORDER — MIDAZOLAM HYDROCHLORIDE 1 MG/ML
INJECTION INTRAMUSCULAR; INTRAVENOUS AS NEEDED
Status: DISCONTINUED | OUTPATIENT
Start: 2022-08-10 | End: 2022-08-10 | Stop reason: HOSPADM

## 2022-08-10 RX ORDER — ISOSORBIDE DINITRATE 20 MG/1
30 TABLET ORAL
Status: DISCONTINUED | OUTPATIENT
Start: 2022-08-10 | End: 2022-08-12 | Stop reason: HOSPADM

## 2022-08-10 RX ORDER — METOPROLOL SUCCINATE 25 MG/1
25 TABLET, EXTENDED RELEASE ORAL DAILY
Status: DISCONTINUED | OUTPATIENT
Start: 2022-08-11 | End: 2022-08-12

## 2022-08-10 RX ORDER — ACETAMINOPHEN 325 MG/1
650 TABLET ORAL EVERY 4 HOURS PRN
Status: DISCONTINUED | OUTPATIENT
Start: 2022-08-10 | End: 2022-08-12 | Stop reason: HOSPADM

## 2022-08-10 RX ORDER — DICYCLOMINE HYDROCHLORIDE 10 MG/1
10 CAPSULE ORAL
Status: DISCONTINUED | OUTPATIENT
Start: 2022-08-10 | End: 2022-08-12 | Stop reason: HOSPADM

## 2022-08-10 RX ORDER — NICARDIPINE HCL-0.9% SOD CHLOR 1 MG/10 ML
SYRINGE (ML) INTRAVENOUS AS NEEDED
Status: DISCONTINUED | OUTPATIENT
Start: 2022-08-10 | End: 2022-08-10 | Stop reason: HOSPADM

## 2022-08-10 RX ORDER — SODIUM CHLORIDE 9 MG/ML
75 INJECTION, SOLUTION INTRAVENOUS CONTINUOUS
Status: DISCONTINUED | OUTPATIENT
Start: 2022-08-10 | End: 2022-08-11

## 2022-08-10 RX ORDER — FENTANYL CITRATE 50 UG/ML
INJECTION, SOLUTION INTRAMUSCULAR; INTRAVENOUS AS NEEDED
Status: DISCONTINUED | OUTPATIENT
Start: 2022-08-10 | End: 2022-08-10 | Stop reason: HOSPADM

## 2022-08-10 RX ORDER — LIDOCAINE HYDROCHLORIDE 10 MG/ML
INJECTION, SOLUTION EPIDURAL; INFILTRATION; INTRACAUDAL; PERINEURAL AS NEEDED
Status: DISCONTINUED | OUTPATIENT
Start: 2022-08-10 | End: 2022-08-10 | Stop reason: HOSPADM

## 2022-08-10 RX ORDER — ALPRAZOLAM 0.25 MG/1
0.25 TABLET ORAL NIGHTLY PRN
Status: DISCONTINUED | OUTPATIENT
Start: 2022-08-10 | End: 2022-08-12 | Stop reason: HOSPADM

## 2022-08-10 RX ORDER — HEPARIN SODIUM 1000 [USP'U]/ML
INJECTION, SOLUTION INTRAVENOUS; SUBCUTANEOUS AS NEEDED
Status: DISCONTINUED | OUTPATIENT
Start: 2022-08-10 | End: 2022-08-10 | Stop reason: HOSPADM

## 2022-08-10 RX ORDER — ROSUVASTATIN CALCIUM 10 MG/1
10 TABLET, COATED ORAL NIGHTLY
Status: DISCONTINUED | OUTPATIENT
Start: 2022-08-10 | End: 2022-08-12 | Stop reason: HOSPADM

## 2022-08-10 RX ORDER — LEVOTHYROXINE SODIUM 0.07 MG/1
75 TABLET ORAL DAILY
Status: DISCONTINUED | OUTPATIENT
Start: 2022-08-11 | End: 2022-08-12 | Stop reason: HOSPADM

## 2022-08-10 RX ORDER — AMLODIPINE BESYLATE 5 MG/1
5 TABLET ORAL NIGHTLY
Status: DISCONTINUED | OUTPATIENT
Start: 2022-08-11 | End: 2022-08-11

## 2022-08-10 RX ORDER — ASPIRIN 81 MG/1
81 TABLET ORAL DAILY
Status: DISCONTINUED | OUTPATIENT
Start: 2022-08-10 | End: 2022-08-12 | Stop reason: HOSPADM

## 2022-08-10 RX ADMIN — SODIUM CHLORIDE 100 ML/HR: 9 INJECTION, SOLUTION INTRAVENOUS at 06:26

## 2022-08-10 RX ADMIN — ALPRAZOLAM 0.25 MG: 0.25 TABLET ORAL at 21:16

## 2022-08-10 RX ADMIN — HEPARIN SODIUM 12 UNITS/KG/HR: 5000 INJECTION INTRAVENOUS; SUBCUTANEOUS at 03:07

## 2022-08-10 RX ADMIN — ROSUVASTATIN CALCIUM 10 MG: 10 TABLET, COATED ORAL at 21:13

## 2022-08-10 RX ADMIN — DICYCLOMINE HYDROCHLORIDE 10 MG: 10 CAPSULE ORAL at 21:16

## 2022-08-10 RX ADMIN — TICAGRELOR 90 MG: 90 TABLET ORAL at 15:36

## 2022-08-10 RX ADMIN — ASPIRIN 81 MG: 81 TABLET, COATED ORAL at 15:36

## 2022-08-10 RX ADMIN — ISOSORBIDE DINITRATE 30 MG: 20 TABLET ORAL at 21:13

## 2022-08-10 RX ADMIN — ACETAMINOPHEN 325MG 650 MG: 325 TABLET ORAL at 21:16

## 2022-08-10 RX ADMIN — HYDROCODONE BITARTRATE AND ACETAMINOPHEN 1 TABLET: 10; 325 TABLET ORAL at 03:06

## 2022-08-10 RX ADMIN — NICARDIPINE HYDROCHLORIDE 5 MG/HR: 2.5 INJECTION, SOLUTION INTRAVENOUS at 19:01

## 2022-08-10 NOTE — NURSING NOTE
[facility] accepted transfer, states truck will be available after shift change around [phone]. CHRISTINA Ashley available to ride with patient.

## 2022-08-10 NOTE — PHARMACY PATIENT ASSISTANCE
Pharmacy evaluated the cost of Brilinta for patient. Brilinta is covered on the patient's insurance with a copay of $43.50. Patient left before affordability was able to be discussed, but if this is not affordable, patient may ask cardiologist about switching to Plavix at follow-up appointment.    Thank you,    Molly Sparks, PharmD  08/10/22  15:02 EDT

## 2022-08-10 NOTE — NURSING NOTE
Patient stated she is being transferred to [facility] for another procedure.  After reviewing notes Dr Julien is having her transferred for possible atherectomy

## 2022-08-10 NOTE — PLAN OF CARE
Goal Outcome Evaluation:  Plan of Care Reviewed With: patient   Progress: no change     Pt VSS, afebrile, on room air. Pt has rested well this shift, ambulated to RR without complication. Pt denies SOA/CP this shift, no S/S of distress noted. TR band removed per protocol, heparin gtt currently infusing see MAR. Pt has no complaints at this time, will continue to monitor for remainder of shift.      Problem: Adult Inpatient Plan of Care  Goal: Plan of Care Review  Outcome: Ongoing, Progressing  Flowsheets (Taken 8/10/2022 0443)  Progress: no change  Plan of Care Reviewed With: patient  Goal: Patient-Specific Goal (Individualized)  Outcome: Ongoing, Progressing  Goal: Absence of Hospital-Acquired Illness or Injury  Outcome: Ongoing, Progressing  Intervention: Identify and Manage Fall Risk  Recent Flowsheet Documentation  Taken 8/10/2022 0300 by Eva Roland RN  Safety Promotion/Fall Prevention:   safety round/check completed   activity supervised  Taken 8/10/2022 0100 by Eva Roland RN  Safety Promotion/Fall Prevention:   safety round/check completed   activity supervised  Taken 8/9/2022 2300 by Eva Roland RN  Safety Promotion/Fall Prevention:   safety round/check completed   activity supervised  Taken 8/9/2022 2100 by Eva Roland RN  Safety Promotion/Fall Prevention:   safety round/check completed   activity supervised  Taken 8/9/2022 1900 by Eva Roland RN  Safety Promotion/Fall Prevention:   safety round/check completed   activity supervised  Intervention: Prevent Skin Injury  Recent Flowsheet Documentation  Taken 8/9/2022 2020 by Eva Roland RN  Skin Protection:   tubing/devices free from skin contact   adhesive use limited  Intervention: Prevent and Manage VTE (Venous Thromboembolism) Risk  Recent Flowsheet Documentation  Taken 8/10/2022 0220 by Eva Roland RN  VTE Prevention/Management: (heparin gtt) other (see comments)  Goal: Optimal Comfort and Wellbeing  Outcome: Ongoing,  Progressing  Intervention: Provide Person-Centered Care  Recent Flowsheet Documentation  Taken 8/10/2022 0220 by Eva Roland RN  Trust Relationship/Rapport:   care explained   choices provided   thoughts/feelings acknowledged  Taken 8/9/2022 2020 by Eva Roland RN  Trust Relationship/Rapport:   care explained   choices provided  Goal: Readiness for Transition of Care  Outcome: Ongoing, Progressing     Problem: Fall Injury Risk  Goal: Absence of Fall and Fall-Related Injury  Outcome: Ongoing, Progressing  Intervention: Identify and Manage Contributors  Recent Flowsheet Documentation  Taken 8/10/2022 0300 by Eva Roland RN  Medication Review/Management: medications reviewed  Taken 8/10/2022 0100 by Eva Roland RN  Medication Review/Management: medications reviewed  Taken 8/9/2022 2300 by Eva Roland RN  Medication Review/Management: medications reviewed  Taken 8/9/2022 2100 by Eva Roland RN  Medication Review/Management: medications reviewed  Taken 8/9/2022 1900 by Eva Roland RN  Medication Review/Management: medications reviewed  Intervention: Promote Injury-Free Environment  Recent Flowsheet Documentation  Taken 8/10/2022 0300 by Eva Roland RN  Safety Promotion/Fall Prevention:   safety round/check completed   activity supervised  Taken 8/10/2022 0100 by Eva Roland RN  Safety Promotion/Fall Prevention:   safety round/check completed   activity supervised  Taken 8/9/2022 2300 by Eva Roland RN  Safety Promotion/Fall Prevention:   safety round/check completed   activity supervised  Taken 8/9/2022 2100 by Eva Roland RN  Safety Promotion/Fall Prevention:   safety round/check completed   activity supervised  Taken 8/9/2022 1900 by Eva Roland RN  Safety Promotion/Fall Prevention:   safety round/check completed   activity supervised     Problem: Asthma Comorbidity  Goal: Maintenance of Asthma Control  Outcome: Ongoing, Progressing  Intervention: Maintain Asthma Symptom  Control  Recent Flowsheet Documentation  Taken 8/10/2022 0300 by Eva Roland RN  Medication Review/Management: medications reviewed  Taken 8/10/2022 0100 by Eva Roland RN  Medication Review/Management: medications reviewed  Taken 8/9/2022 2300 by Eva Roland RN  Medication Review/Management: medications reviewed  Taken 8/9/2022 2100 by Eva Roland RN  Medication Review/Management: medications reviewed  Taken 8/9/2022 1900 by Eva Roland RN  Medication Review/Management: medications reviewed     Problem: COPD (Chronic Obstructive Pulmonary Disease) Comorbidity  Goal: Maintenance of COPD Symptom Control  Outcome: Ongoing, Progressing  Intervention: Maintain COPD-Symptom Control  Recent Flowsheet Documentation  Taken 8/10/2022 0300 by Eva Roland RN  Medication Review/Management: medications reviewed  Taken 8/10/2022 0100 by Eva Roland RN  Medication Review/Management: medications reviewed  Taken 8/9/2022 2300 by Eva Roland RN  Medication Review/Management: medications reviewed  Taken 8/9/2022 2100 by Eva Roland RN  Medication Review/Management: medications reviewed  Taken 8/9/2022 1900 by Eva Roland RN  Medication Review/Management: medications reviewed     Problem: Hypertension Comorbidity  Goal: Blood Pressure in Desired Range  Outcome: Ongoing, Progressing  Intervention: Maintain Blood Pressure Management  Recent Flowsheet Documentation  Taken 8/10/2022 0300 by Eva Roland RN  Medication Review/Management: medications reviewed  Taken 8/10/2022 0100 by Eva Roland RN  Medication Review/Management: medications reviewed  Taken 8/9/2022 2300 by Eva Roland RN  Medication Review/Management: medications reviewed  Taken 8/9/2022 2100 by Eva Roland RN  Medication Review/Management: medications reviewed  Taken 8/9/2022 1900 by Eva Roland RN  Medication Review/Management: medications reviewed     Problem: Osteoarthritis Comorbidity  Goal: Maintenance of  Osteoarthritis Symptom Control  Outcome: Ongoing, Progressing  Intervention: Maintain Osteoarthritis Symptom Control  Recent Flowsheet Documentation  Taken 8/10/2022 0300 by Eva Roland RN  Medication Review/Management: medications reviewed  Taken 8/10/2022 0100 by Eva Roland RN  Medication Review/Management: medications reviewed  Taken 8/9/2022 2300 by Eva Roland RN  Medication Review/Management: medications reviewed  Taken 8/9/2022 2100 by Eva Roland RN  Medication Review/Management: medications reviewed  Taken 8/9/2022 1900 by Eva Roland RN  Medication Review/Management: medications reviewed     Problem: Pain Chronic (Persistent) (Comorbidity Management)  Goal: Acceptable Pain Control and Functional Ability  Outcome: Ongoing, Progressing  Intervention: Manage Persistent Pain  Recent Flowsheet Documentation  Taken 8/10/2022 0300 by Eva Roland RN  Medication Review/Management: medications reviewed  Taken 8/10/2022 0100 by Eva Roland RN  Medication Review/Management: medications reviewed  Taken 8/9/2022 2300 by Eva Roland RN  Medication Review/Management: medications reviewed  Taken 8/9/2022 2100 by Eva Roland RN  Medication Review/Management: medications reviewed  Taken 8/9/2022 1900 by Eva Roland RN  Medication Review/Management: medications reviewed  Intervention: Optimize Psychosocial Wellbeing  Recent Flowsheet Documentation  Taken 8/10/2022 0220 by Eva Roland RN  Diversional Activities: television  Family/Support System Care:   support provided   self-care encouraged  Taken 8/9/2022 2020 by Eva Roland RN  Diversional Activities: television  Family/Support System Care:   support provided   self-care encouraged     Problem: Skin Injury Risk Increased  Goal: Skin Health and Integrity  Outcome: Ongoing, Progressing  Intervention: Optimize Skin Protection  Recent Flowsheet Documentation  Taken 8/9/2022 2020 by Eva Roland RN  Pressure Reduction  Techniques:   weight shift assistance provided   frequent weight shift encouraged  Pressure Reduction Devices: pressure-redistributing mattress utilized  Skin Protection:   tubing/devices free from skin contact   adhesive use limited

## 2022-08-10 NOTE — NURSING NOTE
Report given to Darlin Levy at [facility]. Pt will go to [facility] room 1801.

## 2022-08-10 NOTE — NURSING NOTE
ACC REVIEW REPORT: [facility]        PATIENT NAME: Tila Cleveland    PATIENT ID: [id number]        COVID-19 ACC SCREENING   Done       DOES THE PATIENT HAVE A FEVER GREATER THAN OR EQUAL .4: no    IS THE PATIENT EXPERIENCING SHORTNESS OF BREATH: no      DOES THE PATIENT HAVE A COUGH: no    DOES THE PATIENT HAVE ANY OF THE FOLLOWING RISK FACTORS:  no    EXPOSURE TO SUSPECTED OR KNOWN COVID-19: no      RECENT TRAVEL HISTORY TO ENDEMIC AREA (DOMESTIC/LOCAL):no       IS THE PATIENT A HEALTHCARE WORKER: no    HAS THE PATIENT EXPERIENCED A LOSS OF SENSE OF TASTE OR SMELL: no      HAS THE PATIENT BEEN TESTED FOR COVID-19:  yes      DATE TESTED:  8/9/22   NEGATIVE           BED: 9801    BED TYPE: [facility]    BED GIVEN TO: Eva Roland RN on PCU      TIME BED GIVEN: 0520      TODAY'S DATE: 8/10/2022    TRANSFER DATE: 8/10/22      ETA:  After 8am       TRANSFERRING FACILITY: [facility]     TRANSFERRING FACILITY PHONE # : [phone]    TRANSFERRING MD:  Jalil     DATE/TIME REQUEST RECEIVED: 8-09    [facility] RN: JAMIE Levy RN     REPORT FROM:  Eva Roland RN on PCU       TIME REPORT TAKEN: 0510    DIAGNOSIS: S/P  Inf STEMI     REASON FOR TRANSFER TO [facility]: Higher level of care     TRANSPORTATION: ground    CLINICAL REASON FOR TRANSFER TO [facility]:  intervention to [facility]       CLINICAL INFORMATION    HEIGHT:170.2    WEIGHT: 64.2 kg    ALLERGIES: PCN/  Quinilone    INFECTIOUS DISEASE: None     ISOLATION: none    VITAL SIGNS:   TIME: 0517  TEMP: 98.04  PULSE: 73  NSR   B/P: 143/83  RESP: 18        LAB INFORMATION:  Creat 0.78 (got  L IVF slowly overnight, but completed)      CULTURE INFORMATION: NA     MEDS/IV FLUIDS:   20g in Left wrist at 11pm  On 8/9      CARDIAC SYSTEM:    CHEST PAIN:  None since cath       RHYTHM:   NSR occ PVC     Is patient taking or has patient been given any drugs that could increase bleeding?  yes    DRUG: Heparin gtt at 12U/kg/hr  Started at 3am today      DOSE/FREQUENCY:  "    TROPONIN:    DATE: 08/09  TIME: 1340  TROP:  .029      HEART CATH:  Yes / PTCA to RCA     HEART CATH DATE: 8/9/22    HEART CATH RESULTS:         RCA  100%       LM  No occ       Mid LAD *0% at D1 bifurcation        Om 50%         EF 50-55%          CARDIAC NOTES:   TR band was on right wrist, no hematoma at site       RESPIRATORY SYSTEM:      OXYGEN:  RA     O2 SAT: 91-93%        RESPIRATORY STATUS:       CNS/MUSCULOSKELETAL      ALERT AND ORIENTED:  Yes     CNS/MUSCULOSKELETAL NOTES:   Up w asst 1.          SKIN INJURY:   Pressure injury, Stage II on Left glueteal  (POA) to [facility]          GI//GY;      Has been NPO since midnight.        GI//GY NOTES:   Normal diet at home is nectar thick liquids.            PAST MEDICAL HISTORY:  Prev stent/   Hypothyroidism/  HTN /  Lung nodule /  Hx of silent aspiration on thin liquids.        OTHER SYMPTOM NOTES:       ADDITIONAL NOTES:   Pt has living will scanned into computer, as DNR/ DNI but transferring cardiologists discussed \"leaving patient as full code for the procedure today\"  However she remains Full code in computer .   Her grandson is her POA              Jessica Zamudio RN  8/10/2022  05:10 EDT  "

## 2022-08-10 NOTE — H&P
[facility]   [address], [address]  [place], [place], [address]    [phone]  [email or web address]           HISTORY AND PHYSICAL NOTE    Patient Care Team:  Patient Care Team:  Jodi Guthrie APRN as PCP - General (Nurse Practitioner)      Chief complaint: STEMI         HPI:    Tila Cleveland is a 82 y.o. female.    Cardiac focused problem list:  1. STEMI  a. [facility] 8/9/2022: 100% stenosis of the RCA, 70 to 80% LAD stenosis, 40 to 50% OM stenosis, EF 50 to 55%.  Status post balloon angioplasty to the RCA.  b. Transfer to [facility] for possible atherectomy  2. CAD  a. Remote PCI approximately 20 years ago (data deficient)  3. Hypertension  4. Hypothyroidism  5. Lung nodule     Patient presents today as a transfer from [facility].  She presented to the ED at [facility] yesterday with onset of acute chest pain.  Initial EMS field EKG revealing inferior STEMI with sinus bradycardia.  Did have significant ST elevations in the inferior leads with reciprocal changes suggestive of inferior STEMI at [facility].  She was taken to the Cath Lab and underwent left heart catheterization revealing 70 to 80% stenosis in the LAD, 40 to 50% stenosis to the OM, 100% stenosis of the RCA, EF 50 to 55%.  Status post balloon angioplasty of the proximal RCA which continue to have dilatable heavy calcification needing atherectomy and debulking prior to PCI.  Case was discussed with Dr. Curtis who recommended patient be transferred to [facility] for possible atherectomy of proximal RCA.    Patient reports she has had increased fatigue and weakness for the last several weeks.  She woke up yesterday morning with acute onset of chest pain which persisted so she called 911.  She currently denies chest pain, palpitations, shortness of breath, lower extremity edema, lightheadedness or syncope.     Review of Systems:  Positive for weakness, fatigue, chest pain.   All other systems reviewed and are negative.    PFSH:  Patient  Active Problem List   Diagnosis   • Left lower lobe pulmonary nodule   • ST elevation myocardial infarction (STEMI), unspecified artery (HCC)       Current Facility-Administered Medications on File Prior to Encounter   Medication Dose Route Frequency Provider Last Rate Last Admin   • [COMPLETED] sodium chloride 0.9 % infusion    Continuous PRN Subramaniyam, Allen Santacruz MD 75 mL/hr at 08/09/22 1409 75 mL/hr at 08/09/22 1409   • [DISCONTINUED] acetaminophen (TYLENOL) tablet 650 mg  650 mg Oral Q4H PRN Subramaniyam, Allen Santacruz MD       • [DISCONTINUED] ALPRAZolam (XANAX) tablet 0.5 mg  0.5 mg Oral Nightly PRN Subramaniyam, Allen Santacruz MD       • [DISCONTINUED] ALPRAZolam (XANAX) tablet 1 mg  1 mg Oral Nightly PRN Subramaniyam, Allen Santacruz MD       • [DISCONTINUED] aspirin EC tablet 81 mg  81 mg Oral Daily Subramaniyam, Allen Santacruz MD       • [DISCONTINUED] atorvastatin (LIPITOR) tablet 40 mg  40 mg Oral Nightly Subramaniyam, Allen Santacruz MD   40 mg at 08/09/22 2101   • [DISCONTINUED] atropine injection    PRN Subramaniyam, Allen Santacruz MD   0.5 mg at 08/09/22 1352   • [DISCONTINUED] fentaNYL citrate (PF) (SUBLIMAZE) injection    PRN Subramaniyam, Allen Santacruz MD   25 mcg at 08/09/22 1355   • [DISCONTINUED] heparin (porcine) 5000 UNIT/ML injection 1,900 Units  30 Units/kg Intravenous PRN Subramaniyam, Allen Santacruz MD       • [DISCONTINUED] heparin (porcine) 5000 UNIT/ML injection 3,700 Units  60 Units/kg Intravenous Once Subramaniyam, Allen Santacruz MD       • [DISCONTINUED] heparin (porcine) 5000 UNIT/ML injection 3,900 Units  60 Units/kg Intravenous PRN Subramaniyam, Allen Santacruz MD       • [DISCONTINUED] heparin (porcine) injection    PRN Subramaniyam, Allen Santacruz MD   2,000 Units at 08/09/22 1441   • [DISCONTINUED] heparin 08777 units/250 mL (100 units/mL) in 0.9% NaCl infusion  12 Units/kg/hr Intravenous Titrated Subramaniyam, Allen MD Noam 7.7 mL/hr at 08/10/22 0307 12 Units/kg/hr  at 08/10/22 0307   • [DISCONTINUED] HYDROcodone-acetaminophen (NORCO)  MG per tablet 1 tablet  1 tablet Oral Q8H PRN BayronaniyaAllen cornejo MD   1 tablet at 08/10/22 0306   • [DISCONTINUED] iopamidol (ISOVUE-370) 76 % injection    PRN Allen Jimenes MD   135 mL at 08/09/22 1443   • [DISCONTINUED] isosorbide dinitrate (ISORDIL) tablet 30 mg  30 mg Oral BID SubramaniyaAllen cornejo MD   30 mg at 08/09/22 2101   • [DISCONTINUED] levothyroxine (SYNTHROID, LEVOTHROID) tablet 75 mcg  75 mcg Oral Daily SubramaniyamAllen MD       • [DISCONTINUED] levothyroxine (SYNTHROID, LEVOTHROID) tablet 75 mcg  75 mcg Oral Q AM Allen Jimenes MD       • [DISCONTINUED] lidocaine (XYLOCAINE) 2% injection    PRN SubfarhanmAllen MD   2 mL at 08/09/22 1354   • [DISCONTINUED] lisinopril (PRINIVIL,ZESTRIL) tablet 5 mg  5 mg Oral Daily SubramaniyamAllen MD       • [DISCONTINUED] midazolam (VERSED) injection    PRN Allen Jimenes MD   1 mg at 08/09/22 1355   • [DISCONTINUED] nitroglycerin 100 mcg/mL in D5W syringe    PRN Allen Jimenes MD   200 mcg at 08/09/22 1429   • [DISCONTINUED] O2 (OXYGEN)    PRN Allen Jimenes MD   2 L at 08/09/22 1346   • [DISCONTINUED] phenylephrine (JEWEL-SYNEPHRINE) injection    PRN Allen Jimenes MD   200 mcg at 08/09/22 1400   • [DISCONTINUED] sodium chloride 0.9 % flush 10 mL  10 mL Intravenous PRN Allen Jimenes MD       • [DISCONTINUED] sodium chloride 0.9 % flush 10 mL  10 mL Intravenous Q12H Allen Jimenes MD   10 mL at 08/09/22 2106   • [DISCONTINUED] sodium chloride 0.9 % flush 10 mL  10 mL Intravenous PRN SubramaniyamAllen MD       • [DISCONTINUED] sodium chloride 0.9 % infusion  100 mL/hr Intravenous Continuous Subramaniyam, Allen Santacruz  mL/hr at 08/10/22 0626 100 mL/hr at 08/10/22 0626   • [DISCONTINUED] ticagrelor (BRILINTA)  tablet 180 mg  180 mg Oral Once SubramaniyamAllen MD       • [DISCONTINUED] ticagrelor (BRILINTA) tablet 90 mg  90 mg Oral BID SubramaniyamAllen MD   90 mg at 22 2101   • [DISCONTINUED] ticagrelor (BRILINTA) tablet    PRN NbamAllen MD   180 mg at 22 1436   • [DISCONTINUED] verapamil (ISOPTIN) injection    PRN DemetriyamAllen MD   2.5 mg at 22 1355     Current Outpatient Medications on File Prior to Encounter   Medication Sig Dispense Refill   • ALPRAZolam (XANAX) 1 MG tablet Take 0.5 mg by mouth Every Night.     • amLODIPine (NORVASC) 5 MG tablet Take 5 mg by mouth Every Night.     • dextromethorphan polistirex ER (DELSYM) 30 MG/5ML Suspension Extended Release oral suspension Take 30 mg by mouth Every 12 (Twelve) Hours As Needed (cough).     • dicyclomine (Bentyl) 10 MG capsule Take 1 capsule by mouth 4 (Four) Times a Day Before Meals & at Bedtime. 120 capsule 0   • HYDROcodone-acetaminophen (NORCO)  MG per tablet Take 1 tablet by mouth Every 8 (Eight) Hours As Needed for Moderate Pain .     • isosorbide dinitrate (ISORDIL) 30 MG tablet Take 30 mg by mouth 2 (Two) Times a Day.     • levothyroxine (SYNTHROID, LEVOTHROID) 75 MCG tablet Take 1 tablet by mouth Daily. 30 tablet 0   • metoprolol succinate XL (TOPROL-XL) 25 MG 24 hr tablet Take 25 mg by mouth Daily.       Allergies   Allergen Reactions   • Quinolones Other (See Comments)     Can not recall reaction but she had to go to the hospital.   • Penicillins Rash       Social History     Socioeconomic History   • Marital status:    • Number of children: 3   Tobacco Use   • Smoking status: Former Smoker     Packs/day: 1.00     Years: 20.00     Pack years: 20.00     Types: Cigarettes     Quit date:      Years since quittin.6   • Smokeless tobacco: Current User     Types: Snuff   Vaping Use   • Vaping Use: Never used   Substance and Sexual Activity   • Alcohol use: No   • Drug  use: No   • Sexual activity: Defer     Family History   Problem Relation Age of Onset   • Diabetes Mother    • Stroke Mother    • Lung cancer Father             Objective:     Vital Sign Min/Max for last 24 hours  Temp  Min: 97.4 °F (36.3 °C)  Max: 98.4 °F (36.9 °C)   BP  Min: 95/87  Max: 154/95   Pulse  Min: 46  Max: 81   Resp  Min: 12  Max: 22   SpO2  Min: 83 %  Max: 100 %   Flow (L/min)  Min: 2  Max: 2    No intake or output data in the 24 hours ending 08/10/22 1100        There were no vitals filed for this visit.    CONSTITUTIONAL: Well-nourished. In no acute distress.   SKIN: Warm and dry. No rashes noted  HEENT: Head is normocephalic and atraumatic. Mucous membranes are pink and moist.   LUNGS: Normal effort. Clear to auscultation bilaterally without wheezing, rhonchi, or rales noted.   CARDIOVASCULAR: Regular rate and rhythm with a normal S1 and S2. There is no murmur, gallop, rub, or click appreciated.   PERIPHERAL VASCULAR: Carotid upstroke is 2+ bilaterally and without bruits. Radial pulses are 2+ bilaterally. Right femoral pulse 2+. There is no lower extremity edema. Right radial access site without bruising or bleeding. Right DP 1-2+  ABDOMEN: Normal bowel sounds.  Soft with no tenderness with palpitation.   MUSCULOSKELETAL:  No digital cyanosis  NEUROLOGICAL: Nonfocal.  PSYCHIATRIC: Alert, orientated x 3, appropriate affect and mood    Lab results reviewed by myself:  Lab Results   Component Value Date    CKTOTAL 58 04/21/2022    TROPONINT 0.029 08/09/2022       Lab Results   Component Value Date    CHOL 139 08/10/2022     Lab Results   Component Value Date    TRIG 200 (H) 08/10/2022     Lab Results   Component Value Date    HDL 41 08/10/2022     Lab Results   Component Value Date    LDL 65 08/10/2022     No components found for: LDLDIRECTC    Diagnostic Data:    EKG 8/09/2022:  NSR with T wave abnormality    Results for orders placed during the hospital encounter of 09/21/19    Adult Transthoracic  Echo Complete W/ Cont if Necessary Per Protocol    Interpretation Summary  · Left ventricular systolic function is normal. Estimated EF appears to be in the range of 56 - 60%.  · Left ventricular diastolic dysfunction (grade I) consistent with impaired relaxation.  · Normal cardiac chamber dimensions.  · The aortic valve is structurally normal. No aortic valve regurgitation is present. No aortic valve stenosis is present.  · Trace-to-mild mitral valve regurgitation is present. No significant mitral valve stenosis is present  · Mild tricuspid valve regurgitation is present. No evidence of pulmonary hypertension is present  · The pulmonic valve is not well visualized  · There is no evidence of pericardial effusion  · The study is technically difficult for diagnosis.    [facility] 8/09/2022  · CAD proximal % occlusion s/ p POBA with 2.0/2.5 and 3.0 trek balloon, heavily calcified lesion, ballon undilatable, will need atherectomy prior to stenting.   · LAD mid high grade calcified 80% stenosis bifurcation lesion at D1 take off which also had tandem 79-80% stenosis in proximal, OM artery mild to moderate 50% tubular stenosis in mid.         Tele:  NSR         Assessment and Plan:     Problem list:    * No active hospital problems. *      ASSESSMENT:  1. STEMI/CAD  a. Prior remote PCI (data deficient)  b. [facility] 8/09/2022 revealing 100% RCA stenosis status post balloon angioplasty with 80% post PCI stenosis. Also with a residual 70% calcified mid LAD stenosis  2. Hypertension  3. Bradycardia   4. Hypothyroidism   5. Solitary pulmonary nodule  a. Stable per CT imaging.     PLAN:  1. The risks, benefits, and alternative options of cardiac catheterization were discussed with the patient and her daughter at the bedside.  The risks include death, MI, stroke, infection, vascular injury requiring surgical repair and/or blood transfusion, coronary dissection, renal dysfunction/failure, allergic reaction, emergent CABG.  If PCI is  needed there is a 1-2% risk of emergent CABG. In particular, the patient's RCA is heavily calcified and was not dilatable by balloon angioplasty at [facility].  Coronary atherectomy is planned for today's procedure.  The unique risks associated with atherectomy were discussed with the patient and her daughter.  She and her daughter understand and are agreeable to proceed.  2. Left heart catheterization today with planned atherectomy to the RCA and possible temporary pacer wire insertion with Dr. Curtis today via right femoral approach. Right femoral pulse 2+.   3. Echocardiogram to assess for structural or functional abnormalities.   4. Aspirin 81 mg daily.  5. Continue Brilinta 90 mg twice daily.  6. Add Crestor 10 mg   7. Gentle hydration with NS at 75 ml/hr.   8. Stop heparin gtt   9. Will admit to cardiology service after cath.      Scribed for Dr. Curtis by Linette Faye, APRN. 8/10/2022  11:00 EDT    I, Diego Curtis MD, personally performed the services as scribed by the above named individual. I have made any necessary edits and it is both accurate and complete.     Diego Curtis MD, MSc, FACC, [facility]  Interventional Cardiology  [facility]

## 2022-08-10 NOTE — CASE MANAGEMENT/SOCIAL WORK
"Discharge Planning Assessment   Omar     Patient Name: Tila Cleveland  MRN: [id number]  Today's Date: 8/10/2022    Admit Date: 8/9/2022     Discharge Plan     Row Name 08/10/22 0851       Plan    Plan SS received consult for \"dc planning; patient lives home alone.\" SS noted that pt is awaiting transportation to [facility]. SS signing off and can be re-consulted if needed.    Patient/Family in Agreement with Plan yes              WILIAN Betts    "

## 2022-08-10 NOTE — NURSING NOTE
Patient transported to [facility] at [facility] with EMS. Patient on heparin gtt and continuous IV fluids during transfer.     Departed [facility] at 0925.    The following vitals were obtained during transfer:     0928 - /80, Pulse 78, Respirations 18, O2 sat 90%  0940 - /85, Pulse 67, Respirations 16, O2 sat 94%  0955 - /78, Pulse 72, Respirations 18, O2 sat 94%  1010 - /83, Pulse 73, Respirations 14, O2 sat 93%  1025 - /80, Pulse 74, Respirations 18, O2 sat 94%    Arrived to [facility] at 1045.     Patient handed off to CHRISTINA Pereira in [facility].

## 2022-08-10 NOTE — NURSING NOTE
Ambulance services contact for Pt transport to [facility].   [facility] - Stated they will call back after they speak to day shift.   [place] - Said to call back at 0730.  [place] [facility] - No answer.

## 2022-08-11 ENCOUNTER — APPOINTMENT (OUTPATIENT)
Dept: GENERAL RADIOLOGY | Facility: HOSPITAL | Age: 82
End: 2022-08-11

## 2022-08-11 ENCOUNTER — APPOINTMENT (OUTPATIENT)
Dept: CARDIOLOGY | Facility: HOSPITAL | Age: 82
End: 2022-08-11

## 2022-08-11 LAB
ACT BLD: 260 SECONDS (ref 82–152)
ACT BLD: 370 SECONDS (ref 82–152)
ACT BLD: 393 SECONDS (ref 82–152)
ACT BLD: 486 SECONDS (ref 82–152)
ANION GAP SERPL CALCULATED.3IONS-SCNC: 13 MMOL/L (ref 5–15)
BH CV ECHO MEAS - AO MAX PG: 4.7 MMHG
BH CV ECHO MEAS - AO MEAN PG: 3 MMHG
BH CV ECHO MEAS - AO ROOT DIAM: 3.6 CM
BH CV ECHO MEAS - AO V2 MAX: 108 CM/SEC
BH CV ECHO MEAS - AO V2 VTI: 19.2 CM
BH CV ECHO MEAS - AVA(I,D): 2.31 CM2
BH CV ECHO MEAS - EDV(CUBED): 54.9 ML
BH CV ECHO MEAS - EDV(MOD-SP2): 41.4 ML
BH CV ECHO MEAS - EDV(MOD-SP4): 44.4 ML
BH CV ECHO MEAS - EF(MOD-BP): 56.8 %
BH CV ECHO MEAS - EF(MOD-SP2): 54.3 %
BH CV ECHO MEAS - EF(MOD-SP4): 61.3 %
BH CV ECHO MEAS - ESV(CUBED): 19.7 ML
BH CV ECHO MEAS - ESV(MOD-SP2): 18.9 ML
BH CV ECHO MEAS - ESV(MOD-SP4): 17.2 ML
BH CV ECHO MEAS - FS: 28.9 %
BH CV ECHO MEAS - IVS/LVPW: 1 CM
BH CV ECHO MEAS - IVSD: 1 CM
BH CV ECHO MEAS - LA DIMENSION: 3.4 CM
BH CV ECHO MEAS - LAT PEAK E' VEL: 4.7 CM/SEC
BH CV ECHO MEAS - LV MASS(C)D: 117.3 GRAMS
BH CV ECHO MEAS - LV MAX PG: 2.6 MMHG
BH CV ECHO MEAS - LV MEAN PG: 1 MMHG
BH CV ECHO MEAS - LV V1 MAX: 81.2 CM/SEC
BH CV ECHO MEAS - LV V1 VTI: 14.1 CM
BH CV ECHO MEAS - LVIDD: 3.8 CM
BH CV ECHO MEAS - LVIDS: 2.7 CM
BH CV ECHO MEAS - LVOT AREA: 3.1 CM2
BH CV ECHO MEAS - LVOT DIAM: 2 CM
BH CV ECHO MEAS - LVPWD: 1 CM
BH CV ECHO MEAS - MED PEAK E' VEL: 6.2 CM/SEC
BH CV ECHO MEAS - MV A MAX VEL: 96.4 CM/SEC
BH CV ECHO MEAS - MV DEC TIME: 0.24 MSEC
BH CV ECHO MEAS - MV E MAX VEL: 45.7 CM/SEC
BH CV ECHO MEAS - MV E/A: 0.47
BH CV ECHO MEAS - PA ACC TIME: 0.13 SEC
BH CV ECHO MEAS - PA PR(ACCEL): 20.5 MMHG
BH CV ECHO MEAS - PA V2 MAX: 101 CM/SEC
BH CV ECHO MEAS - SV(LVOT): 44.3 ML
BH CV ECHO MEAS - SV(MOD-SP2): 22.5 ML
BH CV ECHO MEAS - SV(MOD-SP4): 27.2 ML
BH CV ECHO MEAS - TAPSE (>1.6): 2.13 CM
BH CV ECHO MEASUREMENTS AVERAGE E/E' RATIO: 8.39
BH CV VAS BP LEFT ARM: NORMAL MMHG
BH CV XLRA - RV BASE: 3.9 CM
BH CV XLRA - RV LENGTH: 5.4 CM
BH CV XLRA - RV MID: 2.6 CM
BH CV XLRA - TDI S': 17.6 CM/SEC
BUN SERPL-MCNC: 9 MG/DL (ref 8–23)
BUN/CREAT SERPL: 14.3 (ref 7–25)
CALCIUM SPEC-SCNC: 8.8 MG/DL (ref 8.6–10.5)
CHLORIDE SERPL-SCNC: 104 MMOL/L (ref 98–107)
CO2 SERPL-SCNC: 23 MMOL/L (ref 22–29)
CREAT SERPL-MCNC: 0.63 MG/DL (ref 0.57–1)
EGFRCR SERPLBLD CKD-EPI 2021: 88.1 ML/MIN/1.73
GLUCOSE SERPL-MCNC: 83 MG/DL (ref 65–99)
HCT VFR BLD AUTO: 36.3 % (ref 34–46.6)
HGB BLD-MCNC: 12.2 G/DL (ref 12–15.9)
MAXIMAL PREDICTED HEART RATE: 137 BPM
NT-PROBNP SERPL-MCNC: 8152 PG/ML (ref 0–1800)
POTASSIUM SERPL-SCNC: 3.8 MMOL/L (ref 3.5–5.2)
QT INTERVAL: 418 MS
QT INTERVAL: 450 MS
QT INTERVAL: 458 MS
QTC INTERVAL: 476 MS
QTC INTERVAL: 487 MS
QTC INTERVAL: 525 MS
SODIUM SERPL-SCNC: 140 MMOL/L (ref 136–145)
STRESS TARGET HR: 116 BPM

## 2022-08-11 PROCEDURE — 93306 TTE W/DOPPLER COMPLETE: CPT

## 2022-08-11 PROCEDURE — 80048 BASIC METABOLIC PNL TOTAL CA: CPT | Performed by: INTERNAL MEDICINE

## 2022-08-11 PROCEDURE — 93010 ELECTROCARDIOGRAM REPORT: CPT | Performed by: INTERNAL MEDICINE

## 2022-08-11 PROCEDURE — 93306 TTE W/DOPPLER COMPLETE: CPT | Performed by: INTERNAL MEDICINE

## 2022-08-11 PROCEDURE — 97530 THERAPEUTIC ACTIVITIES: CPT

## 2022-08-11 PROCEDURE — 83880 ASSAY OF NATRIURETIC PEPTIDE: CPT | Performed by: HOSPITALIST

## 2022-08-11 PROCEDURE — 93005 ELECTROCARDIOGRAM TRACING: CPT | Performed by: INTERNAL MEDICINE

## 2022-08-11 PROCEDURE — 85018 HEMOGLOBIN: CPT | Performed by: INTERNAL MEDICINE

## 2022-08-11 PROCEDURE — 97162 PT EVAL MOD COMPLEX 30 MIN: CPT

## 2022-08-11 PROCEDURE — 71045 X-RAY EXAM CHEST 1 VIEW: CPT

## 2022-08-11 PROCEDURE — 92610 EVALUATE SWALLOWING FUNCTION: CPT

## 2022-08-11 PROCEDURE — 25010000002 SULFUR HEXAFLUORIDE MICROSPH 60.7-25 MG RECONSTITUTED SUSPENSION: Performed by: INTERNAL MEDICINE

## 2022-08-11 PROCEDURE — 99232 SBSQ HOSP IP/OBS MODERATE 35: CPT | Performed by: HOSPITALIST

## 2022-08-11 PROCEDURE — 85014 HEMATOCRIT: CPT | Performed by: INTERNAL MEDICINE

## 2022-08-11 RX ADMIN — ASPIRIN 81 MG: 81 TABLET, COATED ORAL at 09:10

## 2022-08-11 RX ADMIN — DICYCLOMINE HYDROCHLORIDE 10 MG: 10 CAPSULE ORAL at 17:24

## 2022-08-11 RX ADMIN — ALPRAZOLAM 0.25 MG: 0.25 TABLET ORAL at 20:40

## 2022-08-11 RX ADMIN — TICAGRELOR 90 MG: 90 TABLET ORAL at 17:25

## 2022-08-11 RX ADMIN — DICYCLOMINE HYDROCHLORIDE 10 MG: 10 CAPSULE ORAL at 20:40

## 2022-08-11 RX ADMIN — DICYCLOMINE HYDROCHLORIDE 10 MG: 10 CAPSULE ORAL at 09:10

## 2022-08-11 RX ADMIN — ACETAMINOPHEN 325MG 650 MG: 325 TABLET ORAL at 09:10

## 2022-08-11 RX ADMIN — SULFUR HEXAFLUORIDE 2 ML: KIT at 11:57

## 2022-08-11 RX ADMIN — TICAGRELOR 90 MG: 90 TABLET ORAL at 05:32

## 2022-08-11 RX ADMIN — SODIUM CHLORIDE 75 ML/HR: 9 INJECTION, SOLUTION INTRAVENOUS at 07:34

## 2022-08-11 RX ADMIN — METOPROLOL SUCCINATE 25 MG: 25 TABLET, EXTENDED RELEASE ORAL at 09:10

## 2022-08-11 RX ADMIN — LEVOTHYROXINE SODIUM 75 MCG: 0.07 TABLET ORAL at 09:10

## 2022-08-11 RX ADMIN — ISOSORBIDE DINITRATE 30 MG: 20 TABLET ORAL at 09:10

## 2022-08-11 RX ADMIN — ACETAMINOPHEN 325MG 650 MG: 325 TABLET ORAL at 20:40

## 2022-08-11 RX ADMIN — ROSUVASTATIN CALCIUM 10 MG: 10 TABLET, COATED ORAL at 20:40

## 2022-08-11 RX ADMIN — ISOSORBIDE DINITRATE 30 MG: 20 TABLET ORAL at 17:24

## 2022-08-11 NOTE — PLAN OF CARE
Goal Outcome Evaluation:           Progress: improving  Outcome Evaluation: Pt alert and oriented x4. Cardene gtt stopped per protocol. Venous sheath removed this shift. Both groin sites CDI. Pt has remained on bedrest the duration of the shift. NS infusing at 75mL/hr. Tylenol given for pain. AM ECG in chart. Pt voices no needs at this time, call light within reach.

## 2022-08-11 NOTE — THERAPY EVALUATION
Patient Name: Tila Cleveland  : 1940    MRN: [id number]                              Today's Date: 2022       Admit Date: 8/10/2022    Visit Dx:     ICD-10-CM ICD-9-CM   1. 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 (HCC)  I25.700 414.05     411.1     Patient Active Problem List   Diagnosis   • Left lower lobe pulmonary nodule   • ST elevation myocardial infarction (STEMI), unspecified artery (HCC)   • STEMI (ST elevation myocardial infarction) (HCC)     Past Medical History:   Diagnosis Date   • Disease of thyroid gland    • Hypertension    • Hypothyroidism    • Lung nodule      Past Surgical History:   Procedure Laterality Date   • CARDIAC CATHETERIZATION N/A 2022    Procedure: Left Heart Cath;  Surgeon: Allen Jimenes MD;  Location:  [facility];  Service: Cardiovascular;  Laterality: N/A;   • CARDIAC CATHETERIZATION N/A 2022    Procedure: Percutaneous Coronary Intervention;  Surgeon: Allen Jimenes MD;  Location:  [facility];  Service: Cardiovascular;  Laterality: N/A;   • CARDIAC CATHETERIZATION N/A 8/10/2022    Procedure: PERCUTANEOUS CORONARY INTERVENTION of RCA with possible rota;  Surgeon: Diego Curtis MD;  Location:  [facility];  Service: Cardiology;  Laterality: N/A;   • THYROIDECTOMY, PARTIAL        General Information     Row Name 22 1423          Physical Therapy Time and Intention    Document Type evaluation  -ML     Mode of Treatment physical therapy  -ML     Row Name 22 1423          General Information    Patient Profile Reviewed yes  -ML     Prior Level of Function independent:;all household mobility;community mobility;gait;transfer  Patient ambulated with rollator, assist from family with transfer in and out of tub. HHA when ascending/descending stairs  -ML     Existing Precautions/Restrictions fall  -ML     Barriers to  Rehab medically complex  -     Row Name 08/11/22 1423          Living Environment    People in Home grandchild(raisa)  -     Row Name 08/11/22 1423          Home Main Entrance    Number of Stairs, Main Entrance five  -ML     Stair Railings, Main Entrance railing on left side (ascending)  -     Row Name 08/11/22 1423          Stairs Within Home, Primary    Number of Stairs, Within Home, Primary none  -     Row Name 08/11/22 1423          Cognition    Orientation Status (Cognition) oriented x 4  -     Row Name 08/11/22 1423          Safety Issues, Functional Mobility    Safety Issues Affecting Function (Mobility) awareness of need for assistance;insight into deficits/self-awareness  -     Impairments Affecting Function (Mobility) balance;endurance/activity tolerance;shortness of breath;strength  -           User Key  (r) = Recorded By, (t) = Taken By, (c) = Cosigned By    Initials Name Provider Type     Elmira Villalba Physical Therapist               Mobility     Row Name 08/11/22 1425          Bed Mobility    Bed Mobility supine-sit  -ML     Supine-Sit Union (Bed Mobility) contact guard;verbal cues;nonverbal cues (demo/gesture);1 person assist  -ML     Assistive Device (Bed Mobility) bed rails;head of bed elevated  -     Row Name 08/11/22 1425          Bed-Chair Transfer    Bed-Chair Union (Transfers) contact guard;verbal cues;nonverbal cues (demo/gesture);1 person assist  -ML     Assistive Device (Bed-Chair Transfers) walker, front-wheeled  -ML     Comment, (Bed-Chair Transfer) Patient took several steps to transfer from EOB to chair with RW.  -     Row Name 08/11/22 1425          Sit-Stand Transfer    Sit-Stand Union (Transfers) verbal cues;contact guard;1 person assist  -ML     Assistive Device (Sit-Stand Transfers) walker, front-wheeled  -ML     Comment, (Sit-Stand Transfer) Verbal cues for corerct hand placement  -     Row Name 08/11/22 1425          Gait/Stairs  (Locomotion)    Young Level (Gait) contact guard;1 person assist  -ML     Assistive Device (Gait) walker, front-wheeled  -ML     Distance in Feet (Gait) 3  -ML     Deviations/Abnormal Patterns (Gait) bilateral deviations;lacey decreased;gait speed decreased  -ML     Bilateral Gait Deviations forward flexed posture  -ML     Comment, (Gait/Stairs) Patient required additional time with transfers and ambulation to chair.  -ML           User Key  (r) = Recorded By, (t) = Taken By, (c) = Cosigned By    Initials Name Provider Type    ML Elmira Villalba Physical Therapist               Obj/Interventions     Row Name 08/11/22 1429          Range of Motion Comprehensive    General Range of Motion no range of motion deficits identified  -     Row Name 08/11/22 1429          Strength Comprehensive (MMT)    General Manual Muscle Testing (MMT) Assessment lower extremity strength deficits identified  -ML     Comment, General Manual Muscle Testing (MMT) Assessment BLE grossly 3/5  -ML     Row Name 08/11/22 1429          Motor Skills    Motor Skills functional endurance  -ML     Functional Endurance decreased funcitonal endurance, unable to ambulate out of room  -     Row Name 08/11/22 1429          Balance    Balance Assessment sitting static balance;sitting dynamic balance;sit to stand dynamic balance;standing static balance;standing dynamic balance  -ML     Static Sitting Balance supervision  -ML     Dynamic Sitting Balance contact guard  -ML     Position, Sitting Balance unsupported;sitting edge of bed  -ML     Sit to Stand Dynamic Balance contact guard;verbal cues;1-person assist  -ML     Static Standing Balance contact guard;verbal cues  -ML     Dynamic Standing Balance contact guard;verbal cues  -ML     Position/Device Used, Standing Balance supported;walker, rolling  -ML     Balance Interventions sitting;standing;sit to stand;supported;static;dynamic  -ML           User Key  (r) = Recorded By, (t) = Taken By, (c) =  Cosigned By    Initials Name Provider Type    Elmira Dickson Physical Therapist               Goals/Plan     Row Name 08/11/22 1436          Bed Mobility Goal 1 (PT)    Activity/Assistive Device (Bed Mobility Goal 1, PT) sit to supine/supine to sit  -ML     Stearns Level/Cues Needed (Bed Mobility Goal 1, PT) modified independence  -ML     Time Frame (Bed Mobility Goal 1, PT) 10 days  -ML     Row Name 08/11/22 1436          Transfer Goal 1 (PT)    Activity/Assistive Device (Transfer Goal 1, PT) sit-to-stand/stand-to-sit;bed-to-chair/chair-to-bed;walker, rolling  -ML     Ste[place]s Level/Cues Needed (Transfer Goal 1, PT) modified independence  -ML     Time Frame (Transfer Goal 1, PT) 10 days  -ML     Row Name 08/11/22 1436          Gait Training Goal 1 (PT)    Activity/Assistive Device (Gait Training Goal 1, PT) gait (walking locomotion);improve balance and speed;increase endurance/gait distance;walker, rolling  -ML     Ste[place]s Level (Gait Training Goal 1, PT) modified independence  -ML     Distance (Gait Training Goal 1, PT) 200  -ML     Time Frame (Gait Training Goal 1, PT) 10 days  -ML     [facility] Name 08/11/22 1436          Stairs Goal 1 (PT)    Activity/Assistive Device (Stairs Goal 1, PT) ascending stairs;descending stairs;using handrail, left  -ML     [place]s Level/Cues Needed (Stairs Goal 1, PT) modified independence  -ML     Number of Stairs (Stairs Goal 1, PT) 5  -ML     Time Frame (Stairs Goal 1, PT) 10 days  -ML     Row Name 08/11/22 1436          Therapy Assessment/Plan (PT)    Planned Therapy Interventions (PT) bed mobility training;gait training;home exercise program;patient/family education;postural re-education;strengthening;transfer training  -ML           User Key  (r) = Recorded By, (t) = Taken By, (c) = Cosigned By    Initials Name Provider Type    Elmira Dickson Physical Therapist               Clinical Impression     Row Name 08/11/22 1430          Pain    Pretreatment Pain Rating  0/10 - no pain  -ML     Posttreatment Pain Rating 0/10 - no pain  -ML     Row Name 08/11/22 1430          Plan of Care Review    Plan of Care Reviewed With patient;grandchild(raisa)  -ML     Outcome Evaluation Physical therapy consult received. Patient evaluation complete. The patient requried additional time to complete transfer and OOB transfer to chair. Patient required CGA for sit to stand transfer and CGA for transfer from EOB to chair with RW. Patient with forward flexed posture and limited activity tolerance. Patient with good family support at home. Patient would continue to benefit from skilled PT to progress strength, functional mobility and activity tolerance. At hospital discharge recommend IPR.  -ML     Row Name 08/11/22 1430          Therapy Assessment/Plan (PT)    Patient/Family Therapy Goals Statement (PT) improve mobility and strength  -ML     Rehab Potential (PT) good, to achieve stated therapy goals  -ML     Criteria for Skilled Interventions Met (PT) yes;meets criteria;skilled treatment is necessary  -ML     Therapy Frequency (PT) daily  -ML     Row Name 08/11/22 1430          Vital Signs    Pre Systolic BP Rehab 106  -ML     Pre Treatment Diastolic BP 59  -ML     Intra Systolic BP Rehab 106  -ML     Intra Treatment Diastolic BP 70  -ML     Post Systolic BP Rehab 115  -ML     Post Treatment Diastolic BP 75  -ML     Pretreatment Heart Rate (beats/min) 73  -ML     Posttreatment Heart Rate (beats/min) 78  -ML     Pre SpO2 (%) 95  -ML     O2 Delivery Pre Treatment room air  -ML     Post SpO2 (%) 94  -ML     O2 Delivery Post Treatment room air  -ML     Pre Patient Position Supine  -ML     Intra Patient Position Standing  -ML     Post Patient Position Sitting  -ML     Row Name 08/11/22 1430          Positioning and Restraints    Pre-Treatment Position in bed  -ML     Post Treatment Position chair  -ML     In Chair notified nsg;sitting;call light within reach;encouraged to call for assist;exit alarm  on;with family/caregiver  -           User Key  (r) = Recorded By, (t) = Taken By, (c) = Cosigned By    Initials Name Provider Type    Elmira Dickson Physical Therapist               Outcome Measures     Row Name 08/11/22 1437 08/11/22 0800       How much help from another person do you currently need...    Turning from your back to your side while in flat bed without using bedrails? 3  -ML 3  -KZ    Moving from lying on back to sitting on the side of a flat bed without bedrails? 3  -ML 3  -KZ    Moving to and from a bed to a chair (including a wheelchair)? 3  -ML 2  -KZ    Standing up from a chair using your arms (e.g., wheelchair, bedside chair)? 3  -ML 3  -KZ    Climbing 3-5 steps with a railing? 2  -ML 2  -KZ    To walk in hospital room? 2  -ML 2  -KZ    AM-PAC 6 Clicks Score (PT) 16  -ML 15  -KZ    Highest level of mobility 5 --> Static standing  -ML 4 --> Transferred to chair/commode  -KZ    Row Name 08/11/22 1437          Functional Assessment    Outcome Measure Options AM-PAC 6 Clicks Basic Mobility (PT)  -ML           User Key  (r) = Recorded By, (t) = Taken By, (c) = Cosigned By    Initials Name Provider Type    Germania Vega, RN Registered Nurse    Elmira Dickson Physical Therapist                             Physical Therapy Education                 Title: PT OT SLP Therapies (Done)     Topic: Physical Therapy (Done)     Point: Mobility training (Done)     Learning Progress Summary           Patient Acceptance, E, VU,DU by ML at 8/11/2022 1437   Family Acceptance, E, VU,DU by ML at 8/11/2022 1437                   Point: Home exercise program (Done)     Learning Progress Summary           Patient Acceptance, E, VU,DU by ML at 8/11/2022 1437   Family Acceptance, E, VU,DU by ML at 8/11/2022 1437                   Point: Body mechanics (Done)     Learning Progress Summary           Patient Acceptance, E, VU,DU by ML at 8/11/2022 1437   Family Acceptance, E, VU,DU by ML at 8/11/2022 1437                    Point: Precautions (Done)     Learning Progress Summary           Patient Acceptance, E, VU,DU by  at 8/11/2022 1437   Family Acceptance, E, VU,DU by  at 8/11/2022 1437                               User Key     Initials Effective Dates Name Provider Type Discipline     04/22/21 -  Elmira Villalba Physical Therapist PT              PT Recommendation and Plan  Planned Therapy Interventions (PT): bed mobility training, gait training, home exercise program, patient/family education, postural re-education, strengthening, transfer training  Plan of Care Reviewed With: patient, grandchild(raisa)  Outcome Evaluation: Physical therapy consult received. Patient evaluation complete. The patient requried additional time to complete transfer and OOB transfer to chair. Patient required CGA for sit to stand transfer and CGA for transfer from EOB to chair with RW. Patient with forward flexed posture and limited activity tolerance. Patient with good family support at home. Patient would continue to benefit from skilled PT to progress strength, functional mobility and activity tolerance. At hospital discharge recommend IPR.     Time Calculation:    PT Charges     Row Name 08/11/22 1440             Time Calculation    Start Time 1338  -ML      PT Received On 08/11/22  -ML      PT Goal Re-Cert Due Date 08/21/22  -ML              Timed Charges    04762 - PT Therapeutic Activity Minutes 15  -ML              Untimed Charges    PT Eval/Re-eval Minutes 46  -ML              Total Minutes    Timed Charges Total Minutes 15  -ML      Untimed Charges Total Minutes 46  -ML       Total Minutes 61  -ML            User Key  (r) = Recorded By, (t) = Taken By, (c) = Cosigned By    Initials Name Provider Type     Elmira Villalba Physical Therapist              Therapy Charges for Today     Code Description Service Date Service Provider Modifiers Qty    04576426393  PT THERAPEUTIC ACT EA 15 MIN 8/11/2022 Elmira Villalba GP 1    [id number]  HC PT EVAL MOD COMPLEXITY 4 8/11/2022 Elmira Villalba GP 1          PT G-Codes  Outcome Measure Options: AM-PAC 6 Clicks Basic Mobility (PT)  AM-PAC 6 Clicks Score (PT): 16    Elmira Villalba  8/11/2022

## 2022-08-11 NOTE — PROGRESS NOTES
[facility] Cardiology    Inpatient Progress Note      Chief Complaint/Reason for service:    STEMI         Subjective:       Patient awake and alert, resting in bed.  No acute events overnight.  Notes she has some shortness of breath today. Denies chest pain or palpitations.     Past medical, surgical, social and family history reviewed in the patient's electronic medical record.    Review of Systems:   Positive for shortness of breath.   Negative for chest pain, dyspnea at rest, lower extremity edema, palpitations    Problem List  Active Hospital Problems    Diagnosis  POA    STEMI (ST elevation myocardial infarction) ([facility]) [I21.3]  Yes      Resolved Hospital Problems   No resolved problems to display.            Objective:      Infusions:  niCARdipine, 5-15 mg/hr, Last Rate: Stopped (08/10/22 2208)  sodium chloride, 75 mL/hr, Last Rate: 75 mL/hr (08/11/22 0910)         Medications:    Current Facility-Administered Medications:     acetaminophen (TYLENOL) tablet 650 mg, 650 mg, Oral, Q4H PRN, Diego Curtis MD, 650 mg at 08/11/22 0910    ALPRAZolam (XANAX) tablet 0.25 mg, 0.25 mg, Oral, Nightly PRN, Diego Curtis MD, 0.25 mg at 08/10/22 2116    amLODIPine (NORVASC) tablet 5 mg, 5 mg, Oral, Nightly, Diego Curtis MD    aspirin EC tablet 81 mg, 81 mg, Oral, Daily, Diego Curtis MD, 81 mg at 08/11/22 0910    dicyclomine (BENTYL) capsule 10 mg, 10 mg, Oral, 4x Daily AC & at Bedtime, Diego Curtis MD, 10 mg at 08/11/22 0910    isosorbide dinitrate (ISORDIL) tablet 30 mg, 30 mg, Oral, BID - Nitrates, Diego Curtis MD, 30 mg at 08/11/22 0910    levothyroxine (SYNTHROID, LEVOTHROID) tablet 75 mcg, 75 mcg, Oral, Daily, Diego Curtis MD, 75 mcg at 08/11/22 0910    metoprolol succinate XL (TOPROL-XL) 24 hr tablet 25 mg, 25 mg, Oral, Daily, Diego Curtis MD, 25 mg at 08/11/22 0910    niCARdipine (CARDENE) 25mg in 250mL NS infusion, 5-15 mg/hr, Intravenous, Titrated, Diego Curtis MD, Stopped at  08/10/22 2208    rosuvastatin (CRESTOR) tablet 10 mg, 10 mg, Oral, Nightly, Diego Curtis MD, 10 mg at 08/10/22 2113    sodium chloride 0.9 % infusion, 75 mL/hr, Intravenous, Continuous, Diego Curtis MD, Last Rate: 75 mL/hr at 08/11/22 0910, 75 mL/hr at 08/11/22 0910    ticagrelor (BRILINTA) tablet 90 mg, 90 mg, Oral, Q12H, Diego Curtis MD, 90 mg at 08/11/22 0532    Vital Sign Min/Max for last 24 hours  Temp  Min: 97.8 °F (36.6 °C)  Max: 98.5 °F (36.9 °C)   BP  Min: 71/50  Max: 185/95   Pulse  Min: 71  Max: 88   Resp  Min: 16  Max: 20   SpO2  Min: 81 %  Max: 95 %   No data recorded      Intake/Output Summary (Last 24 hours) at 8/11/2022 1006  Last data filed at 8/11/2022 0910  Gross per 24 hour   Intake 355.27 ml   Output 800 ml   Net -444.73 ml           CONSTITUTIONAL: No acute distress  RESPIRATORY: Normal effort. Clear to auscultation bilaterally without wheezing or rales  CARDIOVASCULAR: Regular rate and rhythm with normal S1 and S2. Without murmur. There is no lower extremity edema bilaterally. Normal radial pulse.   PERIPHERAL VASCULAR:  Right radial and right femoral access site without bruising or bleeding.    Labs/studies:  Available lab and imaging results were reviewed by myself today    Results for orders placed during the hospital encounter of 09/21/19    Adult Transthoracic Echo Complete W/ Cont if Necessary Per Protocol    Interpretation Summary  · Left ventricular systolic function is normal. Estimated EF appears to be in the range of 56 - 60%.  · Left ventricular diastolic dysfunction (grade I) consistent with impaired relaxation.  · Normal cardiac chamber dimensions.  · The aortic valve is structurally normal. No aortic valve regurgitation is present. No aortic valve stenosis is present.  · Trace-to-mild mitral valve regurgitation is present. No significant mitral valve stenosis is present  · Mild tricuspid valve regurgitation is present. No evidence of pulmonary hypertension is present  ·  The pulmonic valve is not well visualized  · There is no evidence of pericardial effusion  · The study is technically difficult for diagnosis.      Tele:  NSR           Assessment/Plan:       ASSESSMENT:  STEMI/CAD  Prior remote PCI (data deficient)  [facility] 8/09/2022 revealing 100% RCA stenosis status post balloon angioplasty with 80% post PCI stenosis. Also with a residual 70% calcified mid LAD stenosis  Status post rotational atherectomy, cutting balloon angioplasty and RANULFO to 90% heavily calcified proximal RCA stenosis, status post angioplasty and RANULFO to 70% calcified mid RCA stenosis.  Hypertension  Bradycardia   Hypothyroidism   Solitary pulmonary nodule  Stable per CT imaging.    PLAN:  Blood pressure improved today.  Off Cardene gtt since late last night. Continue to monitor blood pressure today.   Patient with some shortness of breath today.  Chest x ray and proBNP today.  Echocardiogram pending.   Continue aspirin, Brilinta, statin, Imdur, metoprolol.  Reasonable to switch to Brilinta monotherapy after 30 days following last PCI.   Patient to return to [facility] in 2-4 weeks for staged PCI to the LAD.    PT to evaluate functional status today.   Possibly home tomorrow.       Electronically signed by YAMILET Morgan, 08/11/22, 10:06 AM EDT.

## 2022-08-11 NOTE — THERAPY EVALUATION
Acute Care - Speech Language Pathology   Swallow Initial Evaluation [facility]   Clinical Swallow Evaluation       Patient Name: Tila Cleveland  : 1940  MRN: [id number]  Today's Date: 2022               Admit Date: 8/10/2022    Visit Dx:     ICD-10-CM ICD-9-CM   1. 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 Dysphagia, unspecified type  R13.10 787.20     Patient Active Problem List   Diagnosis   • Left lower lobe pulmonary nodule   • ST elevation myocardial infarction (STEMI), unspecified artery (HCC)   • STEMI (ST elevation myocardial infarction) ([facility])     Past Medical History:   Diagnosis Date   • Disease of thyroid gland    • Hypertension    • Hypothyroidism    • Lung nodule      Past Surgical History:   Procedure Laterality Date   • CARDIAC CATHETERIZATION N/A 2022    Procedure: Left Heart Cath;  Surgeon: Allen Jimenes MD;  Location:  [facility];  Service: Cardiovascular;  Laterality: N/A;   • CARDIAC CATHETERIZATION N/A 2022    Procedure: Percutaneous Coronary Intervention;  Surgeon: Allen Jimenes MD;  Location:  [facility];  Service: Cardiovascular;  Laterality: N/A;   • CARDIAC CATHETERIZATION N/A 8/10/2022    Procedure: PERCUTANEOUS CORONARY INTERVENTION of RCA with possible rota;  Surgeon: Diego Curtis MD;  Location:  [facility];  Service: Cardiology;  Laterality: N/A;   • THYROIDECTOMY, PARTIAL         SLP Recommendation and Plan  SLP Swallowing Diagnosis: suspected pharyngeal dysphagia (22)  SLP Diet Recommendation: regular textures, nectar thick liquids (22)  Recommended Precautions and Strategies: no straw, general aspiration precautions (22)  SLP Rec. for Method of Medication Administration: meds whole, with thick liquids, meds crushed, with pudding or applesauce,  as tolerated (08/11/22 0930)     Monitor for Signs of Aspiration: yes, notify SLP if any concerns (08/11/22 0930)  Recommended Diagnostics: VFSS ([facility] [place] [facility]), other (see comments) (8/12) (08/11/22 0930)  Swallow Criteria for Skilled Therapeutic Interventions Met: demonstrates skilled criteria (08/11/22 0930)  Anticipated Discharge Disposition (SLP): unknown (08/11/22 0930)  Rehab Potential/Prognosis, Swallowing: good, to achieve stated therapy goals (08/11/22 0930)     Predicted Duration Therapy Intervention (Days): until discharge (08/11/22 0930)                                         SWALLOW EVALUATION (last 72 hours)     SLP Adult Swallow Evaluation     Row Name 08/11/22 0930                   Rehab Evaluation    Document Type evaluation  -        Subjective Information no complaints  -        Patient Observations alert;cooperative  -        Patient/Family/Caregiver Comments/Observations No family present  -        Patient Effort good  -        Symptoms Noted During/After Treatment none  -                  General Information    Patient Profile Reviewed yes  -        Pertinent History Of Current Problem Adm from OSH for further cardiac intervention 2/2 STEMI. Hx: RCA stenosis s/p atherectomy (8/10), CAD, left lower lobe pulmonary nodule, HTN, HLD, disease of thyroid gland.  -        Current Method of Nutrition regular textures;nectar/syrup-thick liquids  -        Precautions/Limitations, Vision WFL;for purposes of eval  -        Precautions/Limitations, Hearing WFL;for purposes of eval  -        Prior Level of Function-Communication unknown  -        Prior Level of Function-Swallowing regular textures;nectar thick liquids;other (see comments)  Recent MBS w/ recs for regular diet & NTL no straws  -        Plans/Goals Discussed with patient  -        Barriers to Rehab none identified  -        Patient's Goals for Discharge patient did not state  -        Family Goals for Discharge family  did not state  -                  Pain    Additional Documentation Pain Scale: FACES Pre/Post-Treatment (Group)  -                  Pain Scale: FACES Pre/Post-Treatment    Pain: FACES Scale, Pretreatment 0-->no hurt  -        Posttreatment Pain Rating 0-->no hurt  -                  Oral Motor Structure and Function    Dentition Assessment missing teeth;teeth are in poor condition  -        Secretion Management WNL/WFL  -        Mucosal Quality moist, healthy  -                  Oral Musculature and Cranial Nerve Assessment    Oral Motor General Assessment WFL  -                  General Eating/Swallowing Observations    Respiratory Support Currently in Use room air  -        Eating/Swallowing Skills self-fed;fed by SLP  -        Positioning During Eating upright in bed  -        Utensils Used spoon;cup;straw  -        Consistencies Trialed ice chips;thin liquids;pureed;nectar/syrup-thick liquids;regular textures  -                  Clinical Swallow Eval    Pharyngeal Phase suspected pharyngeal impairment  -        Clinical Swallow Evaluation Summary Overt s/s of aspiration c/b intermittent throat clear w thin liquid via straw sip. No overt s/s w/ nectar thick liquids, pureed, or regular solid trials. Per chart review, pt is on nectar thick liquids at baseline.  Pt had MBS (7/1), as an outpatient w/ recs for regular solids and NTL, no straws. Silent aspiration w/ thin liquid and NTL via straw noted on study. SLP rec: regular solids and nectar thick liquids. Plan to proceed w/ MBS tomorrow to further assess swallow function.  -                  Pharyngeal Phase Concerns    Pharyngeal Phase Concerns throat clear  -        Throat Clear thin  -                  SLP Evaluation Clinical Impression    SLP Swallowing Diagnosis suspected pharyngeal dysphagia  -        Functional Impact risk of aspiration/pneumonia  -        Rehab Potential/Prognosis, Swallowing good, to achieve stated  therapy goals  -        Swallow Criteria for Skilled Therapeutic Interventions Met demonstrates skilled criteria  -                  Recommendations    Predicted Duration Therapy Intervention (Days) until discharge  -        SLP Diet Recommendation regular textures;nectar thick liquids  -        Recommended Diagnostics VFSS ([facility] [place] [facility]);other (see comments)  8/12  -        Recommended Precautions and Strategies no straw;general aspiration precautions  -        Oral Care Recommendations Oral Care BID/PRN  -        SLP Rec. for Method of Medication Administration meds whole;with thick liquids;meds crushed;with pudding or applesauce;as tolerated  -        Monitor for Signs of Aspiration yes;notify SLP if any concerns  -        Anticipated Discharge Disposition (SLP) unknown  -              User Key  (r) = Recorded By, (t) = Taken By, (c) = Cosigned By    Initials Name Effective Dates     Nina Salinas MS, JEANCARLOS-SLP 06/22/22 -                 EDUCATION  The patient has been educated in the following areas:   Dysphagia (Swallowing Impairment) Modified Diet Instruction.              Time Calculation:    Time Calculation- SLP     Row Name 08/11/22 1444             Time Calculation- [facility]    SLP Start Time 0910  -      SLP Received On 08/11/22  -              Untimed Charges    88395-OP Eval Oral Pharyng Swallow Minutes 50  -              Total Minutes    Untimed Charges Total Minutes 50  -       Total Minutes 50  -            User Key  (r) = Recorded By, (t) = Taken By, (c) = Cosigned By    Initials Name Provider Type     Nina Salinas MS, JEANCARLOS-SLP Speech and Language Pathologist                Therapy Charges for Today     Code Description Service Date Service Provider Modifiers Qty    [id number] HC ST EVAL ORAL PHARYNG SWALLOW 3 8/11/2022 Nina Salinas MS, JEANCARLOS-SLP GN 1            Patient was not wearing a face mask and did not exhibit coughing during this therapy encounter.   Procedure performed was not aerosolizing, did not involve close contact (within 6 feet for at least 15 minutes or longer), and did not involve contact with infectious secretions or specimens.  Therapist used appropriate personal protective equipment including gloves, standard procedure mask and eye protection.  Appropriate PPE was worn during the entire therapy session.  Hand hygiene was completed before and after therapy session.       Nina Salinas MS, CFY-SLP  8/11/2022

## 2022-08-11 NOTE — PLAN OF CARE
Goal Outcome Evaluation:  Plan of Care Reviewed With: patient, grandchild(raisa)           Outcome Evaluation: Physical therapy consult received. Patient evaluation complete. The patient required additional time to complete transfer and OOB transfer to chair. Patient required CGA for sit to stand transfer and CGA for transfer from EOB to chair with RW. Patient with forward flexed posture and limited activity tolerance. Patient with good family support at home. Patient would continue to benefit from skilled PT to progress strength, functional mobility and activity tolerance. At hospital discharge recommend IPR.

## 2022-08-11 NOTE — NURSING NOTE
"Pt to ED with SOB which worsened over the past three days. Daughter stated "her anxiety increased with the storm". Pt reports a increase in SOB with exertion. Pt is awake, alert and oriented. Pt SpO2 at 100% on room air. Pt states chest tightness when having trouble breathing.      " [facility] Nursing Consult: coccyx    Patient in bed.  Family at bedside.  Skin assessed and the following noted:    1.Wound Assessment    Wound Type: healing FT (stage 3 or 4) pressure injury with slough, white, red, pink, non-blanchable wound bed, scarring, blanchable, to periwound skin to coccyx        Measurements: 2.5 x 1.5 x 0.25 cm        Wound Edges: slough, pink, epithelization      Drainage: none    Odor: None    Pain: Patient states painful with cleansing and palpation    Care provided: Patient able to turn independently.  Cleansed with normal saline, patted dry applied Sarah collagen to wound base and covered with silicone foam border dressing.  Educated patient and family on importance of offloading site and provided them with a waffle cushion for seat that they can take home at discharge.  Educated them on the importance of even slightly shifting weight recommended during each commercial if patient is watching TV as this will alleviate pressure to the wound.  Educated patient's daughter on collagen and why it was recommended and that it may appear dark/black in the wound bed but to not remove it just cleanse gently and refill wound bed with collagen as needed.    Recommendation(s) for management of wound: see wound care orders  Image:          Pressure Injury Prevention Protocol (initiate for Hesham Score of 18 or less): Most recent Hesham Scale score: 16    *Please apply waffle mattress overlay if not already completed    *Keep skin dry, turn q 2 hr, keep heels elevated and offloaded with offloading heel boots.     *Apply z-guard to bottom BID and as needed with incontinence episodes.    *Apply silicone foam border dressing to bony prominences.      *Follow C.A.R.E protocol if medical devices (Bipap, mcdowell, Ng tube, etc) are being used.    Head to toe assessment completed. Heels  blanchable, intact.      Thank you for consulting the [facility] Nurse.  [facility] Team will plan to follow up.  If alteration to skin  integrity or change in wound bed presentation, please contact WOC team.

## 2022-08-11 NOTE — PROGRESS NOTES
Pt. Referred for Phase II Cardiac Rehab. Staff discussed benefits of exercise, program protocol, and educational material provided. Teach back verified.  Permission granted from patient for staff to fax referral information to outlying program at this time.  Staff faxed referral info to [facility].

## 2022-08-11 NOTE — PROGRESS NOTES
Clinical Nutrition     Patient Name: Tila Cleveland  YOB: 1940  MRN: [id number]  Date of Encounter: 08/11/22 14:35 EDT  Admission date: 8/10/2022    Reason for Visit   Identified at risk by screening criteria     Diet tech saw patient for difficulty chewing/swallowing and decreased appetite.    EMR Reviewed: Yes       Diet Nutrition Related History:      Sheela tech spoke with patient and family members. Family reports giving patient a dysphagia diet and thickened liquids at home. SLP scheduled to do a video swallow tomorrow, 8/12/22. Patient states no known food allergies or food preferences. Family mentioned she was barely eating when she lived alone and has been eating better (with encouragement) since moving in with them. Message sent to nursing to assist feeding, as needed, and to encourage her to eat. Patient had a bed scale weights at [facility] of 141 lbs on 8/9/2022 and again on 8/10/2022. Standing scale weight on 8/10/2022 at [facility] was 132 lbs 15 oz. Patient was sitting in her bedside chair, therefore, bed was zeroed to get a weight today when she gets back in bed. (Message sent to RN to read and report bed scale weight or get a standing scale weight, if possible.)  Patient agreeable to Boost +.    8/10/2022 60.3 kg 132 lb 15 oz Standing scale -   8/10/2022 64 kg 141 lb 1.5 oz Bed scale -   8/9/2022 64.184 kg 141 lb 8 oz Bed scale -   8/9/2022 61.236 kg 135 lb Estimated -   6/17/2022 60.782 kg 134 lb - Report   4/21/2022 68.04 kg 150 lb Stated -   10/13/2021 64.864 kg 143 lb - Report   4/21/2021 62.143 kg 137 lb - Report   10/9/2019 55.339 kg 122 lb - Report   9/26/2019 57.692 kg 127 lb 3 oz - -   9/26/2019 57.743 kg 127 lb 4.8 oz Bed scale -   9/25/2019 - - Bed scale -   9/24/2019 58.968 kg 130 lb Bed scale -   9/23/2019 57.38 kg 126 lb 8 oz Bed scale -   9/22/2019 59.4 kg 130 lb 15.3 oz Bed scale -         Anthropometrics   Height: Height: 170.2 cm  "(67\")  Admission Weight: 132 lbs 15 oz. (Standing scale weight)  Flowsheet Rows    Flowsheet Row First Filed Value   Admission Height 170.2 cm (67\") Documented at 08/10/2022 1110   Admission Weight 60.3 kg (132 lb 15 oz) Documented at 08/10/2022 1110        Last Filed Weight:Weight: 60.3 kg (132 lb 15 oz) (08/10/22 1110)  Weight Method: Standing scale  BMI: BMI (Calculated): 20.8  BMI classification: Normal: 18.5-24.9kg/m2   IBW: Ideal Body Weight (IBW) (kg): 61.86    UBW: 134 lbs (per available data on recorded weights)   Weight stable since 6/2022        Current Nutrition Prescription     Diet Dysphagia; IV - Mechanical Soft No Mixed Consistencies; Nectar / Syrup Thick; No Straws; Cardiac    Average Intake from Charting:      One meal recorded at 25%      Intake History:     Intake Category  Unable to determine at this time.    Actions   Appropriateness for MSA:  Per review patient may qualify for malnutrition diagnosis, RDN to assess/evaluate per protocol.     Interventions:   Follow treatment progress, Interview for preferences, Encourage intake, Supplement provided Boost+, strawberry, BID.    Yris Cage,   Time Spent: 45 minutes  "

## 2022-08-11 NOTE — PAYOR COMM NOTE
"WYATT PATEL, RN  UTILIZATION REVIEW  P:  [phone]  F:  [phone]      Notification of INPT admission.  Clinicals attached.      Ref# [id number]          Tila Cleveland (82 y.o. Female)             Date of Birth   1940    Social Security Number       Address   [address] [place] [address]    Home Phone   [phone]    MRN   [id number]       [facility]    Marital Status                               Admission Date   8/10/22    Admission Type   Urgent    Admitting Provider   Diego Curtis MD    Attending Provider   Diego Curtis MD    Department, Room/Bed   [address], S469/1       Discharge Date       Discharge Disposition       Discharge Destination                               Attending Provider: Diego Curtis MD    Allergies: Quinolones, Penicillins    Isolation: None   Infection: None   Code Status: CPR   Advance Care Planning Activity    Ht: 170.2 cm (67\")   Wt: 60.3 kg (132 lb 15 oz)    Admission Cmt: None   Principal Problem: None                Active Insurance as of 8/10/2022     Primary Coverage     Payor Plan Insurance Group Employer/Plan Group    AETNA MEDICARE REPLACEMENT AETNA MEDICARE REPLACEMENT [id number]     Payor Plan Address Payor Plan Phone Number Payor Plan Fax Number Effective Dates    [address] [phone]  1/1/2019 - None Entered    [facility] [id number]       Subscriber Name Subscriber Birth Date Member ID       Tila Cleveland 1940 [id number]                 Emergency Contacts      (Rel.) Home Phone Work Phone Mobile Phone    SWATI (POA)ABY (Grandchild) [phone] -- [phone]    OLAMIDECEDRICK YOO (Relative) [phone] -- --    Collette,Brittany (Grandchild) [phone] -- [phone]               History & Physical      Diego Curtis MD at 08/10/22 1100           [facility]   [address] [place] [address], [address]  [place], [place], [address] [phone]  " WWW.[facility]           HISTORY AND PHYSICAL NOTE    Patient Care Team:  Patient Care Team:  Jodi Guthrie APRN as PCP - General (Nurse Practitioner)      Chief complaint: STEMI         HPI:    Tila Cleveland is a 82 y.o. female.    Cardiac focused problem list:  1. STEMI  a. [facility] 8/9/2022: 100% stenosis of the RCA, 70 to 80% LAD stenosis, 40 to 50% OM stenosis, EF 50 to 55%.  Status post balloon angioplasty to the RCA.  b. Transfer to [facility] for possible atherectomy  2. CAD  a. Remote PCI approximately 20 years ago (data deficient)  3. Hypertension  4. Hypothyroidism  5. Lung nodule     Patient presents today as a transfer from [facility].  She presented to the ED at [facility] yesterday with onset of acute chest pain.  Initial EMS field EKG revealing inferior STEMI with sinus bradycardia.  Did have significant ST elevations in the inferior leads with reciprocal changes suggestive of inferior STEMI at [facility].  She was taken to the Cath Lab and underwent left heart catheterization revealing 70 to 80% stenosis in the LAD, 40 to 50% stenosis to the OM, 100% stenosis of the RCA, EF 50 to 55%.  Status post balloon angioplasty of the proximal RCA which continue to have dilatable heavy calcification needing atherectomy and debulking prior to PCI.  Case was discussed with Dr. Curtis who recommended patient be transferred to [facility] for possible atherectomy of proximal RCA.    Patient reports she has had increased fatigue and weakness for the last several weeks.  She woke up yesterday morning with acute onset of chest pain which persisted so she called 911.  She currently denies chest pain, palpitations, shortness of breath, lower extremity edema, lightheadedness or syncope.     Review of Systems:  Positive for weakness, fatigue, chest pain.   All other systems reviewed and are negative.    PFSH:  Patient Active Problem List   Diagnosis   • Left lower lobe pulmonary nodule   • ST elevation myocardial infarction (STEMI),  unspecified artery (HCC)       Current Facility-Administered Medications on File Prior to Encounter   Medication Dose Route Frequency Provider Last Rate Last Admin   • [COMPLETED] sodium chloride 0.9 % infusion    Continuous PRN Subramaniyam, Allen Santacruz MD 75 mL/hr at 08/09/22 1409 75 mL/hr at 08/09/22 1409   • [DISCONTINUED] acetaminophen (TYLENOL) tablet 650 mg  650 mg Oral Q4H PRN Subramaniyam, Allen Santacruz MD       • [DISCONTINUED] ALPRAZolam (XANAX) tablet 0.5 mg  0.5 mg Oral Nightly PRN Subramaniyam, Allen Santacruz MD       • [DISCONTINUED] ALPRAZolam (XANAX) tablet 1 mg  1 mg Oral Nightly PRN Subramaniyam, Allen Santacruz MD       • [DISCONTINUED] aspirin EC tablet 81 mg  81 mg Oral Daily Subramaniyam, Allen Santacruz MD       • [DISCONTINUED] atorvastatin (LIPITOR) tablet 40 mg  40 mg Oral Nightly Subramaniyam, Allen Santacruz MD   40 mg at 08/09/22 2101   • [DISCONTINUED] atropine injection    PRN Subramaniyam, Allen Santacruz MD   0.5 mg at 08/09/22 1352   • [DISCONTINUED] fentaNYL citrate (PF) (SUBLIMAZE) injection    PRN Subramaniyam, Allen Santacruz MD   25 mcg at 08/09/22 1355   • [DISCONTINUED] heparin (porcine) 5000 UNIT/ML injection 1,900 Units  30 Units/kg Intravenous PRN Subramaniyam, Allen Santacruz MD       • [DISCONTINUED] heparin (porcine) 5000 UNIT/ML injection 3,700 Units  60 Units/kg Intravenous Once Subramaniyam, Allen Santacruz MD       • [DISCONTINUED] heparin (porcine) 5000 UNIT/ML injection 3,900 Units  60 Units/kg Intravenous PRN Subramaniyam, Allen Santacruz MD       • [DISCONTINUED] heparin (porcine) injection    PRN Demetriyam, Allen Santacruz MD   2,000 Units at 08/09/22 1441   • [DISCONTINUED] heparin 92110 units/250 mL (100 units/mL) in 0.9% NaCl infusion  12 Units/kg/hr Intravenous Titrated Subramcristinayam, Allen Santacruz MD 7.7 mL/hr at 08/10/22 0307 12 Units/kg/hr at 08/10/22 0307   • [DISCONTINUED] HYDROcodone-acetaminophen (NORCO)  MG per tablet 1 tablet  1 tablet Oral  Q8H PRN Subramaniyam, Allen Santacruz MD   1 tablet at 08/10/22 0306   • [DISCONTINUED] iopamidol (ISOVUE-370) 76 % injection    PRN Subvazquezaniyam, Allen Santacruz MD   135 mL at 08/09/22 1443   • [DISCONTINUED] isosorbide dinitrate (ISORDIL) tablet 30 mg  30 mg Oral BID Subramaniyachantal, Allen Santacruz MD   30 mg at 08/09/22 2101   • [DISCONTINUED] levothyroxine (SYNTHROID, LEVOTHROID) tablet 75 mcg  75 mcg Oral Daily Subramaniyam, Allen Santacruz MD       • [DISCONTINUED] levothyroxine (SYNTHROID, LEVOTHROID) tablet 75 mcg  75 mcg Oral Q AM Bayronaniyam, Allen Santacruz MD       • [DISCONTINUED] lidocaine (XYLOCAINE) 2% injection    PRN Subramaniyam, Allen Santacruz MD   2 mL at 08/09/22 1354   • [DISCONTINUED] lisinopril (PRINIVIL,ZESTRIL) tablet 5 mg  5 mg Oral Daily Subramaniyam, Allen Santacruz MD       • [DISCONTINUED] midazolam (VERSED) injection    PRN Subvazquezaniirvingm, Allen Santacruz MD   1 mg at 08/09/22 1355   • [DISCONTINUED] nitroglycerin 100 mcg/mL in D5W syringe    PRN Nbam, Allen Santacruz MD   200 mcg at 08/09/22 1429   • [DISCONTINUED] O2 (OXYGEN)    PRN DemetriyamAllen MD   2 L at 08/09/22 1346   • [DISCONTINUED] phenylephrine (JEWEL-SYNEPHRINE) injection    PRN Subvazquezaniyam, Allen Santacruz MD   200 mcg at 08/09/22 1400   • [DISCONTINUED] sodium chloride 0.9 % flush 10 mL  10 mL Intravenous PRN Demetriyam, Allen Santacruz MD       • [DISCONTINUED] sodium chloride 0.9 % flush 10 mL  10 mL Intravenous Q12H Nbam, Allen Santacruz MD   10 mL at 08/09/22 2106   • [DISCONTINUED] sodium chloride 0.9 % flush 10 mL  10 mL Intravenous PRN Subvazquezaniyam, Allen Santacruz MD       • [DISCONTINUED] sodium chloride 0.9 % infusion  100 mL/hr Intravenous Continuous Subramaniyam, Allen MD Noam 100 mL/hr at 08/10/22 0626 100 mL/hr at 08/10/22 0626   • [DISCONTINUED] ticagrelor (BRILINTA) tablet 180 mg  180 mg Oral Once Subramaniyam, Allen MD Noam       • [DISCONTINUED] ticagrelor (BRILINTA) tablet  90 mg  90 mg Oral BID NbamAllen MD   90 mg at 22 2101   • [DISCONTINUED] ticagrelor (BRILINTA) tablet    PRN Allen Jimenes MD   180 mg at 22 1436   • [DISCONTINUED] verapamil (ISOPTIN) injection    PRN Allen Jimenes MD   2.5 mg at 22 1355     Current Outpatient Medications on File Prior to Encounter   Medication Sig Dispense Refill   • ALPRAZolam (XANAX) 1 MG tablet Take 0.5 mg by mouth Every Night.     • amLODIPine (NORVASC) 5 MG tablet Take 5 mg by mouth Every Night.     • dextromethorphan polistirex ER (DELSYM) 30 MG/5ML Suspension Extended Release oral suspension Take 30 mg by mouth Every 12 (Twelve) Hours As Needed (cough).     • dicyclomine (Bentyl) 10 MG capsule Take 1 capsule by mouth 4 (Four) Times a Day Before Meals & at Bedtime. 120 capsule 0   • HYDROcodone-acetaminophen (NORCO)  MG per tablet Take 1 tablet by mouth Every 8 (Eight) Hours As Needed for Moderate Pain .     • isosorbide dinitrate (ISORDIL) 30 MG tablet Take 30 mg by mouth 2 (Two) Times a Day.     • levothyroxine (SYNTHROID, LEVOTHROID) 75 MCG tablet Take 1 tablet by mouth Daily. 30 tablet 0   • metoprolol succinate XL (TOPROL-XL) 25 MG 24 hr tablet Take 25 mg by mouth Daily.       Allergies   Allergen Reactions   • Quinolones Other (See Comments)     Can not recall reaction but she had to go to the hospital.   • Penicillins Rash       Social History     Socioeconomic History   • Marital status:    • Number of children: 3   Tobacco Use   • Smoking status: Former Smoker     Packs/day: 1.00     Years: 20.00     Pack years: 20.00     Types: Cigarettes     Quit date:      Years since quittin.6   • Smokeless tobacco: Current User     Types: Snuff   Vaping Use   • Vaping Use: Never used   Substance and Sexual Activity   • Alcohol use: No   • Drug use: No   • Sexual activity: Defer     Family History   Problem Relation Age of Onset   • Diabetes Mother    •  Stroke Mother    • Lung cancer Father             Objective:     Vital Sign Min/Max for last 24 hours  Temp  Min: 97.4 °F (36.3 °C)  Max: 98.4 °F (36.9 °C)   BP  Min: 95/87  Max: 154/95   Pulse  Min: 46  Max: 81   Resp  Min: 12  Max: 22   SpO2  Min: 83 %  Max: 100 %   Flow (L/min)  Min: 2  Max: 2    No intake or output data in the 24 hours ending 08/10/22 1100        There were no vitals filed for this visit.    CONSTITUTIONAL: Well-nourished. In no acute distress.   SKIN: Warm and dry. No rashes noted  HEENT: Head is normocephalic and atraumatic. Mucous membranes are pink and moist.   LUNGS: Normal effort. Clear to auscultation bilaterally without wheezing, rhonchi, or rales noted.   CARDIOVASCULAR: Regular rate and rhythm with a normal S1 and S2. There is no murmur, gallop, rub, or click appreciated.   PERIPHERAL VASCULAR: Carotid upstroke is 2+ bilaterally and without bruits. Radial pulses are 2+ bilaterally. Right femoral pulse 2+. There is no lower extremity edema. Right radial access site without bruising or bleeding. Right DP 1-2+  ABDOMEN: Normal bowel sounds.  Soft with no tenderness with palpitation.   MUSCULOSKELETAL:  No digital cyanosis  NEUROLOGICAL: Nonfocal.  PSYCHIATRIC: Alert, orientated x 3, appropriate affect and mood    Lab results reviewed by myself:  Lab Results   Component Value Date    CKTOTAL 58 04/21/2022    TROPONINT 0.029 08/09/2022       Lab Results   Component Value Date    CHOL 139 08/10/2022     Lab Results   Component Value Date    TRIG 200 (H) 08/10/2022     Lab Results   Component Value Date    HDL 41 08/10/2022     Lab Results   Component Value Date    LDL 65 08/10/2022     No components found for: LDLDIRECTC    Diagnostic Data:    EKG 8/09/2022:  NSR with T wave abnormality    Results for orders placed during the hospital encounter of 09/21/19    Adult Transthoracic Echo Complete W/ Cont if Necessary Per Protocol    Interpretation Summary  · Left ventricular systolic function is  normal. Estimated EF appears to be in the range of 56 - 60%.  · Left ventricular diastolic dysfunction (grade I) consistent with impaired relaxation.  · Normal cardiac chamber dimensions.  · The aortic valve is structurally normal. No aortic valve regurgitation is present. No aortic valve stenosis is present.  · Trace-to-mild mitral valve regurgitation is present. No significant mitral valve stenosis is present  · Mild tricuspid valve regurgitation is present. No evidence of pulmonary hypertension is present  · The pulmonic valve is not well visualized  · There is no evidence of pericardial effusion  · The study is technically difficult for diagnosis.    [facility] 8/09/2022  · CAD proximal % occlusion s/ p POBA with 2.0/2.5 and 3.0 trek balloon, heavily calcified lesion, ballon undilatable, will need atherectomy prior to stenting.   · LAD mid high grade calcified 80% stenosis bifurcation lesion at D1 take off which also had tandem 79-80% stenosis in proximal, OM artery mild to moderate 50% tubular stenosis in mid.         Tele:  NSR         Assessment and Plan:     Problem list:    * No active hospital problems. *      ASSESSMENT:  1. STEMI/CAD  a. Prior remote PCI (data deficient)  b. [facility] 8/09/2022 revealing 100% RCA stenosis status post balloon angioplasty with 80% post PCI stenosis. Also with a residual 70% calcified mid LAD stenosis  2. Hypertension  3. Bradycardia   4. Hypothyroidism   5. Solitary pulmonary nodule  a. Stable per CT imaging.     PLAN:  1. The risks, benefits, and alternative options of cardiac catheterization were discussed with the patient and her daughter at the bedside.  The risks include death, MI, stroke, infection, vascular injury requiring surgical repair and/or blood transfusion, coronary dissection, renal dysfunction/failure, allergic reaction, emergent CABG.  If PCI is needed there is a 1-2% risk of emergent CABG. In particular, the patient's RCA is heavily calcified and was not  dilatable by balloon angioplasty at [facility].  Coronary atherectomy is planned for today's procedure.  The unique risks associated with atherectomy were discussed with the patient and her daughter.  She and her daughter understand and are agreeable to proceed.  2. Left heart catheterization today with planned atherectomy to the RCA and possible temporary pacer wire insertion with Dr. Curtis today via right femoral approach. Right femoral pulse 2+.   3. Echocardiogram to assess for structural or functional abnormalities.   4. Aspirin 81 mg daily.  5. Continue Brilinta 90 mg twice daily.  6. Add Crestor 10 mg   7. Gentle hydration with NS at 75 ml/hr.   8. Stop heparin gtt   9. Will admit to cardiology service after cath.      Scribed for Dr. Curtis by Linette Faye, APRN. 8/10/2022  11:00 EDT    I, Diego Curtis MD, personally performed the services as scribed by the above named individual. I have made any necessary edits and it is both accurate and complete.     Diego Curtis MD, MSc, FACC, [facility]  Interventional Cardiology  [facility]              Electronically signed by Diego Curtis MD at 08/10/22 1557       Emergency Department Notes    No notes of this type exist for this encounter.         Vital Signs (last day)     Date/Time Temp Temp src Pulse Resp BP Patient Position SpO2    08/11/22 1000 -- -- 90 17 -- -- 94    08/11/22 0910 -- -- 81 19 101/56 Lying 96    08/11/22 0800 -- -- 89 19 -- -- 93    08/11/22 0718 98.3 (36.8) Oral -- 18 129/76 Lying --    08/11/22 0600 -- -- 74 -- -- -- 95    08/11/22 0400 -- -- 85 -- -- -- 92    08/11/22 0300 98.5 (36.9) Oral 84 18 130/74 Lying 95    08/11/22 0231 -- -- 71 -- 119/71 -- 95    08/11/22 0200 -- -- 77 -- 109/68 -- 91    08/10/22 2310 -- -- 81 -- 87/50 -- 94    08/10/22 2209 -- -- 84 -- 71/50 -- 91    08/10/22 2200 -- -- 85 -- -- -- 90    08/10/22 2034 98 (36.7) Oral -- 20 107/72 Lying 81    08/10/22 2000 -- -- 84 -- 115/65 -- 90    08/10/22  1945 -- -- 81 -- 113/60 -- 91    08/10/22 1930 -- -- 82 -- 111/66 -- 90    08/10/22 1915 -- -- 82 -- 132/76 -- 90    08/10/22 1900 -- -- 74 -- 160/96 -- 94    08/10/22 1845 -- -- 78 -- 138/127 -- --    08/10/22 1839 -- -- 80 -- 154/113 -- 91    08/10/22 18:08:14 -- -- 84 17 163/103 -- 83    08/10/22 15:59:08 -- -- 88 16 185/95 -- 95    08/10/22 1110 97.8 (36.6) Temporal 74 17 169/83 Lying 94    08/10/22 1108 -- -- -- -- -- -- 92    08/10/22 1107 -- -- -- -- 164/92 Lying --          Oxygen Therapy (last day)     Date/Time SpO2 Device (Oxygen Therapy) Flow (L/min) Oxygen Concentration (%) ETCO2 (mmHg)    08/11/22 1000 94 room air -- -- --    08/11/22 0910 96 room air -- -- --    08/11/22 0800 93 room air -- -- --    08/11/22 0600 95 room air -- -- --    08/11/22 0400 92 -- -- -- --    08/11/22 0300 95 -- -- -- --    08/11/22 0231 95 -- -- -- --    08/11/22 0200 91 -- -- -- --    08/10/22 2310 94 -- -- -- --    08/10/22 2209 91 -- -- -- --    08/10/22 2200 90 -- -- -- --    08/10/22 2034 81 -- -- -- --    08/10/22 2000 90 -- -- -- --    08/10/22 1945 91 -- -- -- --    08/10/22 1930 90 -- -- -- --    08/10/22 1915 90 -- -- -- --    08/10/22 1900 94 -- -- -- --    08/10/22 1839 91 room air -- -- --    08/10/22 18:08:14 83 -- -- -- --    08/10/22 15:59:08 95 -- -- -- --    08/10/22 1520 -- room air -- -- --    08/10/22 1120 -- room air -- -- --    08/10/22 1110 94 room air -- -- --    08/10/22 1108 92 -- -- -- --            Current Facility-Administered Medications   Medication Dose Route Frequency Provider Last Rate Last Admin   • acetaminophen (TYLENOL) tablet 650 mg  650 mg Oral Q4H PRN Diego Curtis MD   650 mg at 08/11/22 0910   • ALPRAZolam (XANAX) tablet 0.25 mg  0.25 mg Oral Nightly PRN Diego Curtis MD   0.25 mg at 08/10/22 2116   • amLODIPine (NORVASC) tablet 5 mg  5 mg Oral Nightly Diego Curtis MD       • aspirin EC tablet 81 mg  81 mg Oral Daily Diego Curtis MD   81 mg at 08/11/22 0910   • dicyclomine  (BENTYL) capsule 10 mg  10 mg Oral 4x Daily AC & at Bedtime Diego Curtis MD   10 mg at 08/11/22 0910   • isosorbide dinitrate (ISORDIL) tablet 30 mg  30 mg Oral BID - Nitrates Diego Curtis MD   30 mg at 08/11/22 0910   • levothyroxine (SYNTHROID, LEVOTHROID) tablet 75 mcg  75 mcg Oral Daily Diego Curtis MD   75 mcg at 08/11/22 0910   • metoprolol succinate XL (TOPROL-XL) 24 hr tablet 25 mg  25 mg Oral Daily Diego Curtis MD   25 mg at 08/11/22 0910   • niCARdipine (CARDENE) 25mg in 250mL NS infusion  5-15 mg/hr Intravenous Titrated Diego Curtis MD   Stopped at 08/10/22 2208   • rosuvastatin (CRESTOR) tablet 10 mg  10 mg Oral Nightly Diego Curtis MD   10 mg at 08/10/22 2113   • sodium chloride 0.9 % infusion  75 mL/hr Intravenous Continuous Diego Curtis MD 75 mL/hr at 08/11/22 0910 75 mL/hr at 08/11/22 0910   • ticagrelor (BRILINTA) tablet 90 mg  90 mg Oral Q12H Diego Curtis MD   90 mg at 08/11/22 0532       Lab Results (last 24 hours)     Procedure Component Value Units Date/Time    POC Activated Clotting Time [[id number]]  (Abnormal) Collected: 08/10/22 1802    Specimen: Blood Updated: 08/11/22 0813     Activated Clotting Time  260 Seconds      Comment: Serial Number: [id number]:  [id number]       POC Activated Clotting Time [[id number]]  (Abnormal) Collected: 08/10/22 1659    Specimen: Blood Updated: 08/11/22 0812     Activated Clotting Time  393 Seconds      Comment: Serial Number: [id number]:  [id number]       POC Activated Clotting Time [[id number]]  (Abnormal) Collected: 08/10/22 1647    Specimen: Blood Updated: 08/11/22 0812     Activated Clotting Time  486 Seconds      Comment: Serial Number: [id number]:  [id number]       POC Activated Clotting Time [[id number]]  (Abnormal) Collected: 08/10/22 1621    Specimen: Blood Updated: 08/11/22 0812     Activated Clotting Time  370 Seconds      Comment: Serial Number: [id number]:  [id number]       Basic Metabolic Panel [[id number]]  (Normal) Collected:  08/11/22 0400    Specimen: Blood Updated: 08/11/22 0548     Glucose 83 mg/dL      BUN 9 mg/dL      Creatinine 0.63 mg/dL      Sodium 140 mmol/L      Potassium 3.8 mmol/L      Comment: Slight hemolysis detected by analyzer. Results may be affected.        Chloride 104 mmol/L      CO2 23.0 mmol/L      Calcium 8.8 mg/dL      BUN/Creatinine Ratio 14.3     Anion Gap 13.0 mmol/L      eGFR 88.1 mL/min/1.73      Comment: National Kidney Foundation and American Society of Nephrology (ASN) Task Force recommended calculation based on the Chronic Kidney Disease Epidemiology Collaboration (CKD-EPI) equation refit without adjustment for race.       Narrative:      GFR Normal >60  Chronic Kidney Disease <60  Kidney Failure <15      Hemoglobin & Hematocrit, Blood [[id number]]  (Normal) Collected: 08/11/22 0400    Specimen: Blood Updated: 08/11/22 0516     Hemoglobin 12.2 g/dL      Hematocrit 36.3 %     POC Activated Clotting Time [[id number]]  (Normal) Collected: 08/10/22 2226    Specimen: Blood Updated: 08/10/22 2253     Activated Clotting Time  138 Seconds      Comment: Serial Number: [id number]:  [id number]       POC Activated Clotting Time [[id number]]  (Abnormal) Collected: 08/10/22 1936    Specimen: Blood Updated: 08/10/22 1940     Activated Clotting Time  202 Seconds      Comment: Serial Number: [id number]:  [id number]           Imaging Results (Last 24 Hours)     ** No results found for the last 24 hours. **        Operative/Procedure Notes (last 24 hours)  Notes from 08/10/22 1052 through 08/11/22 1052   No notes of this type exist for this encounter.         Physician Progress Notes (last 24 hours)  Notes from 08/10/22 1052 through 08/11/22 1052   No notes of this type exist for this encounter.         Consult Notes (last 24 hours)  Notes from 08/10/22 1052 through 08/11/22 1052   No notes of this type exist for this encounter.

## 2022-08-11 NOTE — PLAN OF CARE
Goal Outcome Evaluation:   SLP evaluation completed. Will continue to address dysphagia. Please see note for further details and recommendations.

## 2022-08-12 ENCOUNTER — APPOINTMENT (OUTPATIENT)
Dept: GENERAL RADIOLOGY | Facility: HOSPITAL | Age: 82
End: 2022-08-12

## 2022-08-12 ENCOUNTER — READMISSION MANAGEMENT (OUTPATIENT)
Dept: CALL CENTER | Facility: HOSPITAL | Age: 82
End: 2022-08-12

## 2022-08-12 VITALS
BODY MASS INDEX: 21.12 KG/M2 | DIASTOLIC BLOOD PRESSURE: 83 MMHG | WEIGHT: 134.6 LBS | HEART RATE: 85 BPM | OXYGEN SATURATION: 96 % | SYSTOLIC BLOOD PRESSURE: 150 MMHG | RESPIRATION RATE: 18 BRPM | HEIGHT: 67 IN | TEMPERATURE: 98 F

## 2022-08-12 PROCEDURE — 74230 X-RAY XM SWLNG FUNCJ C+: CPT

## 2022-08-12 PROCEDURE — 92611 MOTION FLUOROSCOPY/SWALLOW: CPT

## 2022-08-12 PROCEDURE — 99238 HOSP IP/OBS DSCHRG MGMT 30/<: CPT | Performed by: INTERNAL MEDICINE

## 2022-08-12 RX ORDER — METOPROLOL SUCCINATE 25 MG/1
12.5 TABLET, EXTENDED RELEASE ORAL DAILY
Status: DISCONTINUED | OUTPATIENT
Start: 2022-08-12 | End: 2022-08-12 | Stop reason: HOSPADM

## 2022-08-12 RX ORDER — ASPIRIN 81 MG/1
81 TABLET ORAL DAILY
Qty: 90 TABLET | Refills: 3 | Status: SHIPPED | OUTPATIENT
Start: 2022-08-12

## 2022-08-12 RX ORDER — ROSUVASTATIN CALCIUM 10 MG/1
10 TABLET, COATED ORAL NIGHTLY
Qty: 90 TABLET | Refills: 3 | Status: SHIPPED | OUTPATIENT
Start: 2022-08-12

## 2022-08-12 RX ORDER — METOPROLOL SUCCINATE 25 MG/1
12.5 TABLET, EXTENDED RELEASE ORAL DAILY
Qty: 50 TABLET | Refills: 3 | Status: SHIPPED | OUTPATIENT
Start: 2022-08-12

## 2022-08-12 RX ADMIN — METOPROLOL SUCCINATE 12.5 MG: 25 TABLET, EXTENDED RELEASE ORAL at 08:50

## 2022-08-12 RX ADMIN — BARIUM SULFATE 20 ML: 400 PASTE ORAL at 11:17

## 2022-08-12 RX ADMIN — ASPIRIN 81 MG: 81 TABLET, COATED ORAL at 08:46

## 2022-08-12 RX ADMIN — TICAGRELOR 90 MG: 90 TABLET ORAL at 05:34

## 2022-08-12 RX ADMIN — BARIUM SULFATE 50 ML: 400 SUSPENSION ORAL at 11:20

## 2022-08-12 RX ADMIN — LEVOTHYROXINE SODIUM 75 MCG: 0.07 TABLET ORAL at 08:51

## 2022-08-12 RX ADMIN — ISOSORBIDE DINITRATE 30 MG: 20 TABLET ORAL at 08:51

## 2022-08-12 RX ADMIN — ACETAMINOPHEN 325MG 650 MG: 325 TABLET ORAL at 05:37

## 2022-08-12 RX ADMIN — DICYCLOMINE HYDROCHLORIDE 10 MG: 10 CAPSULE ORAL at 08:47

## 2022-08-12 RX ADMIN — BARIUM SULFATE 100 ML: 0.81 POWDER, FOR SUSPENSION ORAL at 11:17

## 2022-08-12 RX ADMIN — DICYCLOMINE HYDROCHLORIDE 10 MG: 10 CAPSULE ORAL at 12:31

## 2022-08-12 NOTE — DISCHARGE PLACEMENT REQUEST
"German CLAY, RN-BC  Case Management  P: [phone]  F: [phone]    Looking for short term rehab    Tila Cleveland (82 y.o. Female)             Date of Birth   1940    Social Security Number       Address   [address] [place] [address]    Home Phone   [phone]    MRN   [id number]       Mandaeism   [facility]    Marital Status                               Admission Date   8/10/22    Admission Type   Urgent    Admitting Provider   Diego Curtis MD    Attending Provider   Diego Curtis MD    Department, Room/Bed   [address], S469/1       Discharge Date       Discharge Disposition   Home or Self Care    Discharge Destination                               Attending Provider: Diego Curtis MD    Allergies: Quinolones, Penicillins    Isolation: None   Infection: None   Code Status: CPR   Advance Care Planning Activity    Ht: 170.2 cm (67\")   Wt: 61.1 kg (134 lb 9.6 oz)    Admission Cmt: None   Principal Problem: None                Active Insurance as of 8/10/2022     Primary Coverage     Payor Plan Insurance Group Employer/Plan Group    AETNA MEDICARE REPLACEMENT AETNA MEDICARE REPLACEMENT [id number]     Payor Plan Address Payor Plan Phone Number Payor Plan Fax Number Effective Dates    [address] [phone]  1/1/2019 - None Entered    [facility] [id number]       Subscriber Name Subscriber Birth Date Member ID       Tila Cleveland 1940 [id number]                 Emergency Contacts      (Rel.) Home Phone Work Phone Mobile Phone    SWATI (POA)ABY (Grandchild) [phone] -- [phone]    OLAMIDECEDRICK YOO (Relative) [phone] -- --    Collette,Brittany (Grandchild) [phone] -- [phone]               History & Physical      Diego Curtis MD at 08/10/22 1100           [facility] Cardiology   [address] [place] [address], [address]  [place], [place], [address] [phone]  [email or web address]           HISTORY " AND PHYSICAL NOTE    Patient Care Team:  Patient Care Team:  Jodi Guthrie APRN as PCP - General (Nurse Practitioner)      Chief complaint: STEMI         HPI:    Tila Cleveland is a 82 y.o. female.    Cardiac focused problem list:  1. STEMI  a. [facility] 8/9/2022: 100% stenosis of the RCA, 70 to 80% LAD stenosis, 40 to 50% OM stenosis, EF 50 to 55%.  Status post balloon angioplasty to the RCA.  b. Transfer to [facility] for possible atherectomy  2. CAD  a. Remote PCI approximately 20 years ago (data deficient)  3. Hypertension  4. Hypothyroidism  5. Lung nodule     Patient presents today as a transfer from [facility].  She presented to the ED at [facility] yesterday with onset of acute chest pain.  Initial EMS field EKG revealing inferior STEMI with sinus bradycardia.  Did have significant ST elevations in the inferior leads with reciprocal changes suggestive of inferior STEMI at [facility].  She was taken to the Cath Lab and underwent left heart catheterization revealing 70 to 80% stenosis in the LAD, 40 to 50% stenosis to the OM, 100% stenosis of the RCA, EF 50 to 55%.  Status post balloon angioplasty of the proximal RCA which continue to have dilatable heavy calcification needing atherectomy and debulking prior to PCI.  Case was discussed with Dr. Curtis who recommended patient be transferred to [facility] for possible atherectomy of proximal RCA.    Patient reports she has had increased fatigue and weakness for the last several weeks.  She woke up yesterday morning with acute onset of chest pain which persisted so she called 911.  She currently denies chest pain, palpitations, shortness of breath, lower extremity edema, lightheadedness or syncope.     Review of Systems:  Positive for weakness, fatigue, chest pain.   All other systems reviewed and are negative.    PFSH:  Patient Active Problem List   Diagnosis   • Left lower lobe pulmonary nodule   • ST elevation myocardial infarction (STEMI), unspecified artery (HCC)       Current  Facility-Administered Medications on File Prior to Encounter   Medication Dose Route Frequency Provider Last Rate Last Admin   • [COMPLETED] sodium chloride 0.9 % infusion    Continuous PRN Subramaniyam, Allen Santacruz MD 75 mL/hr at 08/09/22 1409 75 mL/hr at 08/09/22 1409   • [DISCONTINUED] acetaminophen (TYLENOL) tablet 650 mg  650 mg Oral Q4H PRN Allen Jimenes MD       • [DISCONTINUED] ALPRAZolam (XANAX) tablet 0.5 mg  0.5 mg Oral Nightly PRN Demetriyam, Allen Santacruz MD       • [DISCONTINUED] ALPRAZolam (XANAX) tablet 1 mg  1 mg Oral Nightly PRN Demetriyam, Allen Santacruz MD       • [DISCONTINUED] aspirin EC tablet 81 mg  81 mg Oral Daily Subramaniyam, Allen Santacruz MD       • [DISCONTINUED] atorvastatin (LIPITOR) tablet 40 mg  40 mg Oral Nightly Subramaniyam, Allen Santacruz MD   40 mg at 08/09/22 2101   • [DISCONTINUED] atropine injection    PRN Demetriyam, Allen Santacruz MD   0.5 mg at 08/09/22 1352   • [DISCONTINUED] fentaNYL citrate (PF) (SUBLIMAZE) injection    PRN SubfarhanmAllen MD   25 mcg at 08/09/22 1355   • [DISCONTINUED] heparin (porcine) 5000 UNIT/ML injection 1,900 Units  30 Units/kg Intravenous PRN SubkarlyaAllen cornejo MD       • [DISCONTINUED] heparin (porcine) 5000 UNIT/ML injection 3,700 Units  60 Units/kg Intravenous Once Milaramaniyachantal, Allen Santacruz MD       • [DISCONTINUED] heparin (porcine) 5000 UNIT/ML injection 3,900 Units  60 Units/kg Intravenous PRN Subramaniyam, Allen Santacruz MD       • [DISCONTINUED] heparin (porcine) injection    PRN Allen Jimenes MD   2,000 Units at 08/09/22 1441   • [DISCONTINUED] heparin 04055 units/250 mL (100 units/mL) in 0.9% NaCl infusion  12 Units/kg/hr Intravenous Titrated SubramcristinayamAllen MD 7.7 mL/hr at 08/10/22 0307 12 Units/kg/hr at 08/10/22 0307   • [DISCONTINUED] HYDROcodone-acetaminophen (NORCO)  MG per tablet 1 tablet  1 tablet Oral Q8H PRN Subramaniyam, Allen MD Noam    1 tablet at 08/10/22 0306   • [DISCONTINUED] iopamidol (ISOVUE-370) 76 % injection    PRN Subramaniyam, Allen Santacruz MD   135 mL at 08/09/22 1443   • [DISCONTINUED] isosorbide dinitrate (ISORDIL) tablet 30 mg  30 mg Oral BID Subramaniulysses, Allen Santacruz MD   30 mg at 08/09/22 2101   • [DISCONTINUED] levothyroxine (SYNTHROID, LEVOTHROID) tablet 75 mcg  75 mcg Oral Daily Subramaniyam, Allen Santacruz MD       • [DISCONTINUED] levothyroxine (SYNTHROID, LEVOTHROID) tablet 75 mcg  75 mcg Oral Q AM Subramaniyam, Allen Santacruz MD       • [DISCONTINUED] lidocaine (XYLOCAINE) 2% injection    PRN Subramaniyam, Allen Santacruz MD   2 mL at 08/09/22 1354   • [DISCONTINUED] lisinopril (PRINIVIL,ZESTRIL) tablet 5 mg  5 mg Oral Daily Subramaniyam, Allen Santacruz MD       • [DISCONTINUED] midazolam (VERSED) injection    PRN Subramaniyam, Allen Santacruz MD   1 mg at 08/09/22 1355   • [DISCONTINUED] nitroglycerin 100 mcg/mL in D5W syringe    PRN Allen Jimenes MD   200 mcg at 08/09/22 1429   • [DISCONTINUED] O2 (OXYGEN)    PRN NbamAllen MD   2 L at 08/09/22 1346   • [DISCONTINUED] phenylephrine (JEWEL-SYNEPHRINE) injection    PRN BayronaniyamAllen MD   200 mcg at 08/09/22 1400   • [DISCONTINUED] sodium chloride 0.9 % flush 10 mL  10 mL Intravenous PRN Bayronaniyam, Allen Santacruz MD       • [DISCONTINUED] sodium chloride 0.9 % flush 10 mL  10 mL Intravenous Q12H BayronaniyamAllen MD   10 mL at 08/09/22 2106   • [DISCONTINUED] sodium chloride 0.9 % flush 10 mL  10 mL Intravenous PRN SubvazquezaniyamAllen MD       • [DISCONTINUED] sodium chloride 0.9 % infusion  100 mL/hr Intravenous Continuous Milaramaniyam, Allen MD Noam 100 mL/hr at 08/10/22 0626 100 mL/hr at 08/10/22 0626   • [DISCONTINUED] ticagrelor (BRILINTA) tablet 180 mg  180 mg Oral Once Subramaniyam, Allen MD Noam       • [DISCONTINUED] ticagrelor (BRILINTA) tablet 90 mg  90 mg Oral BID Subramaniyam, Allen  MD Noam   90 mg at 22 2101   • [DISCONTINUED] ticagrelor (BRILINTA) tablet    PRN Subramaniyam, Allen MD Noam   180 mg at 22 1436   • [DISCONTINUED] verapamil (ISOPTIN) injection    PRN Subramaniyam, Allen MD Noam   2.5 mg at 22 1355     Current Outpatient Medications on File Prior to Encounter   Medication Sig Dispense Refill   • ALPRAZolam (XANAX) 1 MG tablet Take 0.5 mg by mouth Every Night.     • amLODIPine (NORVASC) 5 MG tablet Take 5 mg by mouth Every Night.     • dextromethorphan polistirex ER (DELSYM) 30 MG/5ML Suspension Extended Release oral suspension Take 30 mg by mouth Every 12 (Twelve) Hours As Needed (cough).     • dicyclomine (Bentyl) 10 MG capsule Take 1 capsule by mouth 4 (Four) Times a Day Before Meals & at Bedtime. 120 capsule 0   • HYDROcodone-acetaminophen (NORCO)  MG per tablet Take 1 tablet by mouth Every 8 (Eight) Hours As Needed for Moderate Pain .     • isosorbide dinitrate (ISORDIL) 30 MG tablet Take 30 mg by mouth 2 (Two) Times a Day.     • levothyroxine (SYNTHROID, LEVOTHROID) 75 MCG tablet Take 1 tablet by mouth Daily. 30 tablet 0   • metoprolol succinate XL (TOPROL-XL) 25 MG 24 hr tablet Take 25 mg by mouth Daily.       Allergies   Allergen Reactions   • Quinolones Other (See Comments)     Can not recall reaction but she had to go to the hospital.   • Penicillins Rash       Social History     Socioeconomic History   • Marital status:    • Number of children: 3   Tobacco Use   • Smoking status: Former Smoker     Packs/day: 1.00     Years: 20.00     Pack years: 20.00     Types: Cigarettes     Quit date:      Years since quittin.6   • Smokeless tobacco: Current User     Types: Snuff   Vaping Use   • Vaping Use: Never used   Substance and Sexual Activity   • Alcohol use: No   • Drug use: No   • Sexual activity: Defer     Family History   Problem Relation Age of Onset   • Diabetes Mother    • Stroke Mother    • Lung cancer Father              Objective:     Vital Sign Min/Max for last 24 hours  Temp  Min: 97.4 °F (36.3 °C)  Max: 98.4 °F (36.9 °C)   BP  Min: 95/87  Max: 154/95   Pulse  Min: 46  Max: 81   Resp  Min: 12  Max: 22   SpO2  Min: 83 %  Max: 100 %   Flow (L/min)  Min: 2  Max: 2    No intake or output data in the 24 hours ending 08/10/22 1100        There were no vitals filed for this visit.    CONSTITUTIONAL: Well-nourished. In no acute distress.   SKIN: Warm and dry. No rashes noted  HEENT: Head is normocephalic and atraumatic. Mucous membranes are pink and moist.   LUNGS: Normal effort. Clear to auscultation bilaterally without wheezing, rhonchi, or rales noted.   CARDIOVASCULAR: Regular rate and rhythm with a normal S1 and S2. There is no murmur, gallop, rub, or click appreciated.   PERIPHERAL VASCULAR: Carotid upstroke is 2+ bilaterally and without bruits. Radial pulses are 2+ bilaterally. Right femoral pulse 2+. There is no lower extremity edema. Right radial access site without bruising or bleeding. Right DP 1-2+  ABDOMEN: Normal bowel sounds.  Soft with no tenderness with palpitation.   MUSCULOSKELETAL:  No digital cyanosis  NEUROLOGICAL: Nonfocal.  PSYCHIATRIC: Alert, orientated x 3, appropriate affect and mood    Lab results reviewed by myself:  Lab Results   Component Value Date    CKTOTAL 58 04/21/2022    TROPONINT 0.029 08/09/2022       Lab Results   Component Value Date    CHOL 139 08/10/2022     Lab Results   Component Value Date    TRIG 200 (H) 08/10/2022     Lab Results   Component Value Date    HDL 41 08/10/2022     Lab Results   Component Value Date    LDL 65 08/10/2022     No components found for: LDLDIRECTC    Diagnostic Data:    EKG 8/09/2022:  NSR with T wave abnormality    Results for orders placed during the hospital encounter of 09/21/19    Adult Transthoracic Echo Complete W/ Cont if Necessary Per Protocol    Interpretation Summary  · Left ventricular systolic function is normal. Estimated EF appears to be in the  range of 56 - 60%.  · Left ventricular diastolic dysfunction (grade I) consistent with impaired relaxation.  · Normal cardiac chamber dimensions.  · The aortic valve is structurally normal. No aortic valve regurgitation is present. No aortic valve stenosis is present.  · Trace-to-mild mitral valve regurgitation is present. No significant mitral valve stenosis is present  · Mild tricuspid valve regurgitation is present. No evidence of pulmonary hypertension is present  · The pulmonic valve is not well visualized  · There is no evidence of pericardial effusion  · The study is technically difficult for diagnosis.    [facility] 8/09/2022  · CAD proximal % occlusion s/ p POBA with 2.0/2.5 and 3.0 trek balloon, heavily calcified lesion, ballon undilatable, will need atherectomy prior to stenting.   · LAD mid high grade calcified 80% stenosis bifurcation lesion at D1 take off which also had tandem 79-80% stenosis in proximal, OM artery mild to moderate 50% tubular stenosis in mid.         Tele:  NSR         Assessment and Plan:     Problem list:    * No active hospital problems. *      ASSESSMENT:  1. STEMI/CAD  a. Prior remote PCI (data deficient)  b. [facility] 8/09/2022 revealing 100% RCA stenosis status post balloon angioplasty with 80% post PCI stenosis. Also with a residual 70% calcified mid LAD stenosis  2. Hypertension  3. Bradycardia   4. Hypothyroidism   5. Solitary pulmonary nodule  a. Stable per CT imaging.     PLAN:  1. The risks, benefits, and alternative options of cardiac catheterization were discussed with the patient and her daughter at the bedside.  The risks include death, MI, stroke, infection, vascular injury requiring surgical repair and/or blood transfusion, coronary dissection, renal dysfunction/failure, allergic reaction, emergent CABG.  If PCI is needed there is a 1-2% risk of emergent CABG. In particular, the patient's RCA is heavily calcified and was not dilatable by balloon angioplasty at [facility].  Coronary atherectomy is planned for today's procedure.  The unique risks associated with atherectomy were discussed with the patient and her daughter.  She and her daughter understand and are agreeable to proceed.  2. Left heart catheterization today with planned atherectomy to the RCA and possible temporary pacer wire insertion with Dr. Curtis today via right femoral approach. Right femoral pulse 2+.   3. Echocardiogram to assess for structural or functional abnormalities.   4. Aspirin 81 mg daily.  5. Continue Brilinta 90 mg twice daily.  6. Add Crestor 10 mg   7. Gentle hydration with NS at 75 ml/hr.   8. Stop heparin gtt   9. Will admit to cardiology service after cath.      Scribed for Dr. Curtis by Linette Faye, APRN. 8/10/2022  11:00 EDT    I, Diego Curtis MD, personally performed the services as scribed by the above named individual. I have made any necessary edits and it is both accurate and complete.     Diego Curtis MD, MSc, FACC, [facility]  Interventional Cardiology                Electronically signed by Diego Curtis MD at 08/10/22 6766            Physical Therapy Notes (most recent note)      Elmira Villalba at 22 1338  Version 1 of 1         Patient Name: Tila Cleveland  : 1940    MRN: [id number]                              Today's Date: 2022       Admit Date: 8/10/2022    Visit Dx:     ICD-10-CM ICD-9-CM   1. 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 (HCC)  I25.700 414.05     411.1     Patient Active Problem List   Diagnosis   • Left lower lobe pulmonary nodule   • ST elevation myocardial infarction (STEMI), unspecified artery (HCC)   • STEMI (ST elevation myocardial infarction) (HCC)     Past Medical History:   Diagnosis Date   • Disease of thyroid gland    • Hypertension    • Hypothyroidism    • Lung nodule      Past Surgical History:   Procedure Laterality Date   • CARDIAC  CATHETERIZATION N/A 08/09/2022    Procedure: Left Heart Cath;  Surgeon: Allen Jimenes MD;  Location:  [facility];  Service: Cardiovascular;  Laterality: N/A;   • CARDIAC CATHETERIZATION N/A 08/09/2022    Procedure: Percutaneous Coronary Intervention;  Surgeon: Allen Jimenes MD;  Location:  [facility]ATH [facility]ATION;  Service: Cardiovascular;  Laterality: N/A;   • CARDIAC CATHETERIZATION N/A 8/10/2022    Procedure: PERCUTANEOUS CORONARY INTERVENTION of RCA with possible rota;  Surgeon: Diego Curtis MD;  Location:  [facility];  Service: Cardiology;  Laterality: N/A;   • THYROIDECTOMY, PARTIAL        General Information     Row Name 08/11/22 1423          Physical Therapy Time and Intention    Document Type evaluation  -ML     Mode of Treatment physical therapy  -ML     Row Name 08/11/22 1423          General Information    Patient Profile Reviewed yes  -ML     Prior Level of Function independent:;all household mobility;community mobility;gait;transfer  Patient ambulated with rollator, assist from family with transfer in and out of tub. HHA when ascending/descending stairs  -ML     Existing Precautions/Restrictions fall  -ML     Barriers to Rehab medically complex  -ML     Row Name 08/11/22 1423          Living Environment    People in Home grandchild(raisa)  -ML     Row Name 08/11/22 1423          Home Main Entrance    Number of Stairs, Main Entrance five  -ML     Stair Railings, Main Entrance railing on left side (ascending)  -ML     Row Name 08/11/22 1423          Stairs Within Home, Primary    Number of Stairs, Within Home, Primary none  -ML     Row Name 08/11/22 1423          Cognition    Orientation Status (Cognition) oriented x 4  -ML     Row Name 08/11/22 1423          Safety Issues, Functional Mobility    Safety Issues Affecting Function (Mobility) awareness of need for assistance;insight into deficits/self-awareness  -ML     Impairments  Affecting Function (Mobility) balance;endurance/activity tolerance;shortness of breath;strength  -ML           User Key  (r) = Recorded By, (t) = Taken By, (c) = Cosigned By    Initials Name Provider Type    Elmira Dickson Physical Therapist               Mobility     Row Name 08/11/22 1425          Bed Mobility    Bed Mobility supine-sit  -ML     Supine-Sit [place] (Bed Mobility) contact guard;verbal cues;nonverbal cues (demo/gesture);1 person assist  -ML     Assistive Device (Bed Mobility) bed rails;head of bed elevated  -ML     Row Name 08/11/22 1425          Bed-Chair Transfer    Bed-Chair [place] (Transfers) contact guard;verbal cues;nonverbal cues (demo/gesture);1 person assist  -ML     Assistive Device (Bed-Chair Transfers) walker, front-wheeled  -ML     Comment, (Bed-Chair Transfer) Patient took several steps to transfer from EOB to chair with RW.  -ML     Row Name 08/11/22 1425          Sit-Stand Transfer    Sit-Stand [place] (Transfers) verbal cues;contact guard;1 person assist  -ML     Assistive Device (Sit-Stand Transfers) walker, front-wheeled  -ML     Comment, (Sit-Stand Transfer) Verbal cues for corerct hand placement  -ML     Row Name 08/11/22 1425          Gait/Stairs (Locomotion)    [place] Level (Gait) contact guard;1 person assist  -ML     Assistive Device (Gait) walker, front-wheeled  -ML     Distance in Feet (Gait) 3  -ML     Deviations/Abnormal Patterns (Gait) bilateral deviations;lacey decreased;gait speed decreased  -ML     Bilateral Gait Deviations forward flexed posture  -ML     Comment, (Gait/Stairs) Patient required additional time with transfers and ambulation to chair.  -ML           User Key  (r) = Recorded By, (t) = Taken By, (c) = Cosigned By    Initials Name Provider Type    Elmira Dickson Physical Therapist               Obj/Interventions     Row Name 08/11/22 1429          Range of Motion Comprehensive    General Range of Motion no range of motion deficits  identified  -ML     Row Name 08/11/22 1429          Strength Comprehensive (MMT)    General Manual Muscle Testing (MMT) Assessment lower extremity strength deficits identified  -ML     Comment, General Manual Muscle Testing (MMT) Assessment BLE grossly 3/5  -ML     Row Name 08/11/22 1429          Motor Skills    Motor Skills functional endurance  -ML     Functional Endurance decreased funcitonal endurance, unable to ambulate out of room  -ML     Row Name 08/11/22 1429          Balance    Balance Assessment sitting static balance;sitting dynamic balance;sit to stand dynamic balance;standing static balance;standing dynamic balance  -ML     Static Sitting Balance supervision  -ML     Dynamic Sitting Balance contact guard  -ML     Position, Sitting Balance unsupported;sitting edge of bed  -ML     Sit to Stand Dynamic Balance contact guard;verbal cues;1-person assist  -ML     Static Standing Balance contact guard;verbal cues  -ML     Dynamic Standing Balance contact guard;verbal cues  -ML     Position/Device Used, Standing Balance supported;walker, rolling  -ML     Balance Interventions sitting;standing;sit to stand;supported;static;dynamic  -ML           User Key  (r) = Recorded By, (t) = Taken By, (c) = Cosigned By    Initials Name Provider Type    ML Elmira Villalba Physical Therapist               Goals/Plan     Row Name 08/11/22 1436          Bed Mobility Goal 1 (PT)    Activity/Assistive Device (Bed Mobility Goal 1, PT) sit to supine/supine to sit  -ML     [place] Level/Cues Needed (Bed Mobility Goal 1, PT) modified independence  -ML     Time Frame (Bed Mobility Goal 1, PT) 10 days  -     Row Name 08/11/22 1436          Transfer Goal 1 (PT)    Activity/Assistive Device (Transfer Goal 1, PT) sit-to-stand/stand-to-sit;bed-to-chair/chair-to-bed;walker, rolling  -ML     [place] Level/Cues Needed (Transfer Goal 1, PT) modified independence  -ML     Time Frame (Transfer Goal 1, PT) 10 days  -[facility] Name  08/11/22 1436          Gait Training Goal 1 (PT)    Activity/Assistive Device (Gait Training Goal 1, PT) gait (walking locomotion);improve balance and speed;increase endurance/gait distance;walker, rolling  -ML     [place] Level (Gait Training Goal 1, PT) modified independence  -ML     Distance (Gait Training Goal 1, PT) 200  -ML     Time Frame (Gait Training Goal 1, PT) 10 days  -ML     Row Name 08/11/22 1436          Stairs Goal 1 (PT)    Activity/Assistive Device (Stairs Goal 1, PT) ascending stairs;descending stairs;using handrail, left  -ML     [place] Level/Cues Needed (Stairs Goal 1, PT) modified independence  -ML     Number of Stairs (Stairs Goal 1, PT) 5  -ML     Time Frame (Stairs Goal 1, PT) 10 days  -ML     Row Name 08/11/22 1436          Therapy Assessment/Plan (PT)    Planned Therapy Interventions (PT) bed mobility training;gait training;home exercise program;patient/family education;postural re-education;strengthening;transfer training  -ML           User Key  (r) = Recorded By, (t) = Taken By, (c) = Cosigned By    Initials Name Provider Type    ML Elmira Villalba Physical Therapist               Clinical Impression     Row Name 08/11/22 1430          Pain    Pretreatment Pain Rating 0/10 - no pain  -ML     Posttreatment Pain Rating 0/10 - no pain  -ML     Row Name 08/11/22 1430          Plan of Care Review    Plan of Care Reviewed With patient;grandchild(raisa)  -ML     Outcome Evaluation Physical therapy consult received. Patient evaluation complete. The patient requried additional time to complete transfer and OOB transfer to chair. Patient required CGA for sit to stand transfer and CGA for transfer from EOB to chair with RW. Patient with forward flexed posture and limited activity tolerance. Patient with good family support at home. Patient would continue to benefit from skilled PT to progress strength, functional mobility and activity tolerance. At hospital discharge recommend IPR.  -ML      Row Name 08/11/22 1430          Therapy Assessment/Plan (PT)    Patient/Family Therapy Goals Statement (PT) improve mobility and strength  -ML     Rehab Potential (PT) good, to achieve stated therapy goals  -     Criteria for Skilled Interventions Met (PT) yes;meets criteria;skilled treatment is necessary  -ML     Therapy Frequency (PT) daily  -ML     Row Name 08/11/22 1430          Vital Signs    Pre Systolic BP Rehab 106  -ML     Pre Treatment Diastolic BP 59  -ML     Intra Systolic BP Rehab 106  -ML     Intra Treatment Diastolic BP 70  -ML     Post Systolic BP Rehab 115  -ML     Post Treatment Diastolic BP 75  -ML     Pretreatment Heart Rate (beats/min) 73  -ML     Posttreatment Heart Rate (beats/min) 78  -ML     Pre SpO2 (%) 95  -ML     O2 Delivery Pre Treatment room air  -ML     Post SpO2 (%) 94  -ML     O2 Delivery Post Treatment room air  -ML     Pre Patient Position Supine  -ML     Intra Patient Position Standing  -ML     Post Patient Position Sitting  -ML     Row Name 08/11/22 1430          Positioning and Restraints    Pre-Treatment Position in bed  -ML     Post Treatment Position chair  -ML     In Chair notified nsg;sitting;call light within reach;encouraged to call for assist;exit alarm on;with family/caregiver  -           User Key  (r) = Recorded By, (t) = Taken By, (c) = Cosigned By    Initials Name Provider Type    Elmira Dickson Physical Therapist               Outcome Measures     Row Name 08/11/22 1437 08/11/22 0800       How much help from another person do you currently need...    Turning from your back to your side while in flat bed without using bedrails? 3  -ML 3  -KZ    Moving from lying on back to sitting on the side of a flat bed without bedrails? 3  -ML 3  -KZ    Moving to and from a bed to a chair (including a wheelchair)? 3  -ML 2  -KZ    Standing up from a chair using your arms (e.g., wheelchair, bedside chair)? 3  -ML 3  -KZ    Climbing 3-5 steps with a railing? 2  -ML 2  -KZ     To walk in hospital room? 2  -ML 2  -KZ    AM-PAC 6 Clicks Score (PT) 16  -ML 15  -KZ    Highest level of mobility 5 --> Static standing  -ML 4 --> Transferred to chair/commode  -KZ    Row Name 08/11/22 1437          Functional Assessment    Outcome Measure Options AM-PAC 6 Clicks Basic Mobility (PT)  -ML           User Key  (r) = Recorded By, (t) = Taken By, (c) = Cosigned By    Initials Name Provider Type    KGermania Escobar RN Registered Nurse    Elmira Dickson Physical Therapist                             Physical Therapy Education                 Title: PT OT SLP Therapies (Done)     Topic: Physical Therapy (Done)     Point: Mobility training (Done)     Learning Progress Summary           Patient Acceptance, E, VU,DU by  at 8/11/2022 1437   Family Acceptance, E, VU,DU by  at 8/11/2022 1437                   Point: Home exercise program (Done)     Learning Progress Summary           Patient Acceptance, E, VU,DU by  at 8/11/2022 1437   Family Acceptance, E, VU,DU by  at 8/11/2022 1437                   Point: Body mechanics (Done)     Learning Progress Summary           Patient Acceptance, E, VU,DU by  at 8/11/2022 1437   Family Acceptance, E, VU,DU by  at 8/11/2022 1437                   Point: Precautions (Done)     Learning Progress Summary           Patient Acceptance, E, VU,DU by  at 8/11/2022 1437   Family Acceptance, E, VU,DU by  at 8/11/2022 1437                               User Key     Initials Effective Dates Name Provider Type Discipline     04/22/21 -  Elmira Villalba Physical Therapist PT              PT Recommendation and Plan  Planned Therapy Interventions (PT): bed mobility training, gait training, home exercise program, patient/family education, postural re-education, strengthening, transfer training  Plan of Care Reviewed With: patient, grandchild(raisa)  Outcome Evaluation: Physical therapy consult received. Patient evaluation complete. The patient requried additional  time to complete transfer and OOB transfer to chair. Patient required CGA for sit to stand transfer and CGA for transfer from EOB to chair with RW. Patient with forward flexed posture and limited activity tolerance. Patient with good family support at home. Patient would continue to benefit from skilled PT to progress strength, functional mobility and activity tolerance. At hospital discharge recommend IPR.     Time Calculation:    PT Charges     Row Name 08/11/22 1440             Time Calculation    Start Time 1338  -ML      PT Received On 08/11/22  -ML      PT Goal Re-Cert Due Date 08/21/22  -ML              Timed Charges    92091 - PT Therapeutic Activity Minutes 15  -ML              Untimed Charges    PT Eval/Re-eval Minutes 46  -ML              Total Minutes    Timed Charges Total Minutes 15  -ML      Untimed Charges Total Minutes 46  -ML       Total Minutes 61  -ML            User Key  (r) = Recorded By, (t) = Taken By, (c) = Cosigned By    Initials Name Provider Type     Elmira Villalba Physical Therapist              Therapy Charges for Today     Code Description Service Date Service Provider Modifiers Qty    66971816791 HC PT THERAPEUTIC ACT EA 15 MIN 8/11/2022 Elmira Villalba GP 1    72824743918 HC PT EVAL MOD COMPLEXITY 4 8/11/2022 Elmira Villalba GP 1          PT G-Codes  Outcome Measure Options: AM-PAC 6 Clicks Basic Mobility (PT)  AM-PAC 6 Clicks Score (PT): 16    Elmira Villalba  8/11/2022      Electronically signed by Elmira Villalba at 08/11/22 9377

## 2022-08-12 NOTE — MBS/VFSS/FEES
Acute Care - Speech Language Pathology   Swallow Initial Evaluation [facility]   Modified Barium Swallow Study (MBS)     Patient Name: Tila Cleveland  : 1940  MRN: [id number]  Today's Date: 2022               Admit Date: 8/10/2022    Visit Dx:     ICD-10-CM ICD-9-CM   1. 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 Oropharyngeal dysphagia  R13.12 787.22     Patient Active Problem List   Diagnosis   • Left lower lobe pulmonary nodule   • ST elevation myocardial infarction (STEMI), unspecified artery (HCC)   • STEMI (ST elevation myocardial infarction) ([facility])     Past Medical History:   Diagnosis Date   • Disease of thyroid gland    • Hypertension    • Hypothyroidism    • Lung nodule      Past Surgical History:   Procedure Laterality Date   • CARDIAC CATHETERIZATION N/A 2022    Procedure: Left Heart Cath;  Surgeon: Allen Jimenes MD;  Location:  [facility];  Service: Cardiovascular;  Laterality: N/A;   • CARDIAC CATHETERIZATION N/A 2022    Procedure: Percutaneous Coronary Intervention;  Surgeon: Allen Jimenes MD;  Location:  [facility];  Service: Cardiovascular;  Laterality: N/A;   • CARDIAC CATHETERIZATION N/A 8/10/2022    Procedure: PERCUTANEOUS CORONARY INTERVENTION of RCA with possible rota;  Surgeon: Diego Curtis MD;  Location:  [facility];  Service: Cardiology;  Laterality: N/A;   • THYROIDECTOMY, PARTIAL         SLP Recommendation and Plan  SLP Swallowing Diagnosis: mild, oral dysphagia, mild-moderate, pharyngeal dysphagia (22 1105)  SLP Diet Recommendation: mechanical soft with no mixed consistencies, nectar thick liquids, other (see comments) (may have regular/whole textures, but must avoid mixed consistencies) (22 1105)  Recommended Precautions and Strategies: upright posture during/after  eating, general aspiration precautions, no straw (08/12/22 1105)  SLP Rec. for Method of Medication Administration: meds whole, with thick liquids, with pudding or applesauce, as tolerated (08/12/22 1105)     Monitor for Signs of Aspiration: yes, notify SLP if any concerns (08/12/22 1105)     Swallow Criteria for Skilled Therapeutic Interventions Met: demonstrates skilled criteria (08/12/22 1105)  Anticipated Discharge Disposition (SLP): anticipate therapy at next level of care, home with home health (08/12/22 1105)  Rehab Potential/Prognosis, Swallowing: adequate, monitor progress closely (08/12/22 1105)  Therapy Frequency (Swallow): 5 days per week (08/12/22 1105)  Predicted Duration Therapy Intervention (Days): until discharge (08/12/22 1105)        Plan of Care Reviewed With: patient  Progress: no change      SWALLOW EVALUATION (last 72 hours)     SLP Adult Swallow Evaluation     Row Name 08/12/22 1105 08/11/22 0930                Rehab Evaluation    Document Type evaluation  - evaluation  -       Subjective Information no complaints  - no complaints  -       Patient Observations alert;cooperative  - alert;cooperative  -       Patient/Family/Caregiver Comments/Observations No family present.  - No family present  -       Patient Effort good  - good  -       Symptoms Noted During/After Treatment -- none  -                General Information    Patient Profile Reviewed yes  -AC yes  -       Pertinent History Of Current Problem See previous eval.  -AC Adm from OSH for further cardiac intervention 2/2 STEMI. Hx: RCA stenosis s/p atherectomy (8/10), CAD, left lower lobe pulmonary nodule, HTN, HLD, disease of thyroid gland.  -       Current Method of Nutrition mechanical soft, no mixed consistencies;nectar/syrup-thick liquids  no straws, no mixed consistencies  - regular textures;nectar/syrup-thick liquids  -       Precautions/Limitations, Vision -- WFL;for purposes of eval  -        Precautions/Limitations, Hearing -- WFL;for purposes of eval  -       Prior Level of Function-Communication -- unknown  -       Prior Level of Function-Swallowing -- regular textures;nectar thick liquids;other (see comments)  Recent MBS w/ recs for regular diet & NTL no straws  -       Plans/Goals Discussed with patient;agreed upon  - patient  -       Barriers to Rehab none identified  - none identified  -       Patient's Goals for Discharge patient did not state  - patient did not state  -       Family Goals for Discharge -- family did not state  -                Pain    Additional Documentation -- Pain Scale: FACES Pre/Post-Treatment (Group)  -                Pain Scale: FACES Pre/Post-Treatment    Pain: FACES Scale, Pretreatment 0-->no hurt  - 0-->no hurt  -       Posttreatment Pain Rating 0-->no hurt  -AC 0-->no hurt  -                Oral Motor Structure and Function    Dentition Assessment -- missing teeth;teeth are in poor condition  -       Secretion Management -- WNL/[facility]  -       Mucosal Quality -- moist, healthy  -                Oral Musculature and Cranial Nerve Assessment    Oral Motor General Assessment -- WFL  -                General Eating/Swallowing Observations    Respiratory Support Currently in Use room air  - room air  -       Eating/Swallowing Skills fed by SLP;self-fed;appropriate self-feeding skills observed  - self-fed;fed by SLP  [facility]       Positioning During Eating upright 90 degree;upright in chair  - upright in bed  -       Utensils Used -- spoon;cup;straw  -       Consistencies Trialed -- ice chips;thin liquids;pureed;nectar/syrup-thick liquids;regular textures  -                Clinical Swallow Eval    Pharyngeal Phase -- suspected pharyngeal impairment  -       Clinical Swallow Evaluation Summary -- Overt s/s of aspiration c/b intermittent throat clear w thin liquid via straw sip. No overt s/s w/ nectar thick liquids, pureed, or  regular solid trials. Per chart review, pt is on nectar thick liquids at baseline.  Pt had MBS (7/1), as an outpatient w/ recs for regular solids and NTL, no straws. Silent aspiration w/ thin liquid and NTL via straw noted on study. SLP rec: regular solids and nectar thick liquids. Plan to proceed w/ MBS tomorrow to further assess swallow function.  -                Pharyngeal Phase Concerns    Pharyngeal Phase Concerns -- throat clear  -MH       Throat Clear -- thin  -MH                MBS/VFSS    Utensils Used spoon;cup;straw  -AC --       Consistencies Trialed thin liquids;nectar/syrup-thick liquids;pudding thick;regular textures  -AC --                MBS/VFSS Interpretation    Oral Prep Phase WFL  -AC --       Oral Transit Phase impaired  -AC --       Oral Residue WFL  -AC --                Oral Transit Phase    Impaired Oral Transit Phase piecemeal oral transit;premature spillage of liquids into pharynx  -AC --       Piecemeal Oral Transit all consistencies tested;other (see comments)  ? behavioral  -AC --       Premature Spillage of Liquids into Pharynx thin liquids;nectar-thick liquids;secondary to reduced lingual control  -AC --                Initiation of Pharyngeal Swallow    Initiation of Pharyngeal Swallow bolus in pyriform sinuses  -AC --       Pharyngeal Phase impaired pharyngeal phase of swallowing  -AC --       Penetration During the Swallow thin liquids;nectar-thick liquids;secondary to delayed swallow initiation or mistiming;secondary to reduced laryngeal elevation;secondary to reduced vestibular closure  -AC --       Aspiration During the Swallow thin liquids;secondary to delayed swallow initiation or mistiming;secondary to reduced laryngeal elevation;secondary to reduced vestibular closure  -AC --       Response to Penetration no response  -AC --       Response to Aspiration no response, silent aspiration  -AC --       Rosenbek's Scale thin:;8--->level 8;nectar:;5--->level 5  -AC --        Pharyngeal Residue all consistencies tested;diffuse within pharynx;secondary to reduced posterior pharyngeal wall stripping;secondary to reduced base of tongue retraction;secondary to reduced laryngeal elevation;secondary to reduced hyolaryngeal excursion;other (see comments)  mild w/ thin, mild-mod w/ nectar, mod w/ pudding/solid; greatest in valleculae; affected by hyperlordotic c-spine curvature and partially occluded bolus flow through UES, which appeared to be r/t prominent cricopharyngeous  -AC --       Response to Residue cleared residue with spontaneous subsequent swallow;other (see comments)  partially  -AC --       Attempted Compensatory Maneuvers bolus size;bolus presentation style;chin tuck  -AC --       Response to Attempted Compensatory Maneuvers did not prevent aspiration  -AC --       Pharyngeal Phase, Comment There was silent aspiration of thin liquids and deep penetration that did not expel laryngeal vestibule w/ nectar-thick liquid via straw. Minimal transient penetration w/ nectar via cup. No penetration/aspiration w/ pudding or solid.  -AC --                Swallowing Quality of Life Assessment    Education and counseling provided Safest diet options;Compensatory strategy recommendations and rationale  Provided handouts/information for managing dysphagia & starting HEP program @ home. Also provided samples of thickener.  -AC --                SLP Evaluation Clinical Impression    SLP Swallowing Diagnosis mild;oral dysphagia;mild-moderate;pharyngeal dysphagia  - suspected pharyngeal dysphagia  -       Functional Impact risk of aspiration/pneumonia  - risk of aspiration/pneumonia  -       Rehab Potential/Prognosis, Swallowing adequate, monitor progress closely  -AC good, to achieve stated therapy goals  -       Swallow Criteria for Skilled Therapeutic Interventions Met demonstrates skilled criteria  - demonstrates skilled criteria  -                Recommendations    Therapy  Frequency (Swallow) 5 days per week  - --       Predicted Duration Therapy Intervention (Days) until discharge  - until discharge  -       SLP Diet Recommendation mechanical soft with no mixed consistencies;nectar thick liquids;other (see comments)  may have regular/whole textures, but must avoid mixed consistencies  - regular textures;nectar thick liquids  -       Recommended Diagnostics -- VFSS ([facility]);other (see comments)  8/12  -       Recommended Precautions and Strategies upright posture during/after eating;general aspiration precautions;no straw  - no straw;general aspiration precautions  -       Oral Care Recommendations Oral Care BID/PRN  - Oral Care BID/PRN  -       SLP Rec. for Method of Medication Administration meds whole;with thick liquids;with pudding or applesauce;as tolerated  - meds whole;with thick liquids;meds crushed;with pudding or applesauce;as tolerated  -       Monitor for Signs of Aspiration yes;notify SLP if any concerns  - yes;notify SLP if any concerns  -       Anticipated Discharge Disposition (SLP) anticipate therapy at next level of care;home with home health  - unknown  -             User Key  (r) = Recorded By, (t) = Taken By, (c) = Cosigned By    Initials Name Effective Dates     Ginette Taylor MS CCC-SLP 06/16/21 -      Nina Salinas MS, CFY-SLP 06/22/22 -                 EDUCATION  The patient has been educated in the following areas:   Dysphagia (Swallowing Impairment) Oral Care/Hydration Modified Diet Instruction.        SLP GOALS     Row Name 08/12/22 1105             (LTG) Patient will demonstrate functional swallow for    Diet Texture (Demonstrate functional swallow) regular textures  -      Liquid viscosity (Demonstrate functional swallow) thin liquids  -      [place] (Demonstrate functional swallow) with use of compensatory strategies  -      Time Frame (Demonstrate functional swallow) by discharge  -              (STG)  Patient will tolerate therapeutic trials of    Consistencies Trialed (Tolerate therapeutic trials) thin liquids;mixed consistencies  -AC      Desired Outcome (Tolerate therapeutic trials) without signs/symptoms of aspiration  -AC      [place] (Tolerate therapeutic trials) with minimal cues (75-90% accuracy)  -AC      Time Frame (Tolerate therapeutic trials) by discharge  -AC              (STG) Lingual Strengthening Goal 1 (SLP)    Activity (Lingual Strengthening Goal 1, SLP) increase tongue back strength  -AC      Increase Tongue Back Strength lingual resistance exercises  -AC      [place]/Accuracy (Lingual Strengthening Goal 1, SLP) with minimal cues (75-90% accuracy)  -AC      Time Frame (Lingual Strengthening Goal 1, SLP) short term goal (STG)  -AC              (STG) Pharyngeal Strengthening Exercise Goal 1 (SLP)    Activity (Pharyngeal Strengthening Goal 1, SLP) increase timing;increase superior movement of the hyolaryngeal complex;increase anterior movement of the hyolaryngeal complex;increase closure at entrance to airway/closure of airway at glottis;increase squeeze/positive pressure generation;increase tongue base retraction  -AC      Increase Timing prepping - 3 second prep or suck swallow or 3-step swallow  -AC      Increase Superior Movement of the Hyolaryngeal Complex effortful pitch glide (falsetto + pharyngeal squeeze)  -AC      Increase Anterior Movement of the Hyolaryngeal Complex chin tuck against resistance (CTAR)  -AC      Increase Closure at Entrance to Airway/Closure of Airway at Glottis super-supraglottic swallow  -AC      Increase Squeeze/Positive Pressure Generation magrie  -AC      Increase Tongue Base Retraction hard effortful swallow  -AC      [place]/Accuracy (Pharyngeal Strengthening Goal 1, SLP) with minimal cues (75-90% accuracy)  -AC      Time Frame (Pharyngeal Strengthening Goal 1, SLP) short term goal (STG)  -AC            User Key  (r) = Recorded By, (t) = Taken By,  (c) = Cosigned By    Initials Name Provider Type    Ginette Hicks MS CCC-SLP Speech and Language Pathologist                   Time Calculation:    Time Calculation- SLP     Row Name 08/12/22 1251             Time Calculation- SLP    SLP Start Time 1105  -AC      SLP Received On 08/12/22  -AC              Untimed Charges    12409-HE Motion Fluoro Eval Swallow Minutes 65  -AC              Total Minutes    Untimed Charges Total Minutes 65  -AC       Total Minutes 65  -AC            User Key  (r) = Recorded By, (t) = Taken By, (c) = Cosigned By    Initials Name Provider Type    FAWN Taylor Ginette EDDY MS CCC-SLP Speech and Language Pathologist                Therapy Charges for Today     Code Description Service Date Service Provider Modifiers Qty    26103815492 HC ST MOTION FLUORO EVAL SWALLOW 4 8/12/2022 Ginette Taylor MS CCC-SLP GN 1      Patient was not wearing a face mask and did exhibit coughing during this therapy encounter.  Procedure performed was aerosolizing, involved close contact (within 6 feet for at least 15 minutes or longer), and involved contact with infectious secretions or specimens.  Therapist used appropriate personal protective equipment including gloves, standard procedure mask and eye protection.  Appropriate PPE was worn during the entire therapy session.  Hand hygiene was completed before and after therapy session.            MS DANIKA Berrios  8/12/2022

## 2022-08-12 NOTE — CASE MANAGEMENT/SOCIAL WORK
Case Management Discharge Note      Final Note: Ms. Cleveland is being discharged home today with her grandson and Tiesha Pires. She has decided to go with home health care and [facility] will follow her at home with skilled nursing, physical, occupational, and speech therapies. Ms. Cleveland will be transported home by Tiesha Pires. No additional discharge needs have been identified.         Selected Continued Care - Admitted Since 8/10/2022     Destination    No services have been selected for the patient.              Durable Medical Equipment    No services have been selected for the patient.              Dialysis/Infusion    No services have been selected for the patient.              Home Medical Care Coordination complete.    Service Provider Selected Services Address Phone Fax Patient Preferred    [facility] [id number] [phone] --          Therapy    No services have been selected for the patient.              Community Resources    No services have been selected for the patient.              Community & DME    No services have been selected for the patient.                  Transportation Services  Private: Car    Final Discharge Disposition Code: 06 - home with home health care

## 2022-08-12 NOTE — CASE MANAGEMENT/SOCIAL WORK
Discharge Planning Assessment  [facility]     Patient Name: Tila Cleveland  MRN: [id number]  Today's Date: 8/12/2022    Admit Date: 8/10/2022     Discharge Needs Assessment     Row Name 08/12/22 0916       Living Environment    People in Home grandchild(raisa)    Current Living Arrangements home    Primary Care Provided by self    Provides Primary Care For no one, unable/limited ability to care for self    Family Caregiver if Needed grandchild(raisa), adult    Able to Return to Prior Arrangements yes       Transition Planning    Patient/Family Anticipates Transition to home;inpatient rehabilitation facility    Transportation Anticipated family or friend will provide       Discharge Needs Assessment    Readmission Within the Last 30 Days no previous admission in last 30 days    Equipment Currently Used at Home rollator;bath bench;wheelchair;commode    Discharge Facility/Level of Care Needs acute rehab;nursing facility, skilled               Discharge Plan     Row Name 08/12/22 0917       Plan    Plan Home versus rehab    Patient/Family in Agreement with Plan yes    Plan Comments I met with Ms. Cleveland at the bedside. She lives with her grandson, her POA, and his girlfriend, Tiesha Pires, [place] [facility]. She does have her own home but has been living with Tiesha Pires since April due to her health. She has a rollator, shower chair, wheelchair, and bedside commode at home. She is driven by Tiesha Pires when leaving the home and is not current with any home or outpatient services. She denies any difficulty in obtaining or affording her medications. Ms. Cleveland anticipates going home at the time of discharge and states that Tiesha Pires will transport her at that time. Therapy has recommended rehab and Ms. Cleveland and her family are deciding on rehab versus home with home health. Tiesha Pires will speak with Bhavin CORNEJO) and call me back today with a decision. Case management will continue to follow.    Final Discharge Disposition  Code 30 - still a patient              Continued Care and Services - Admitted Since 8/10/2022    Coordination has not been started for this encounter.       Expected Discharge Date and Time     Expected Discharge Date Expected Discharge Time    Aug 12, 2022          Demographic Summary     Row Name 08/12/22 0911       General Information    General Information Comments Confirmed Ms. Cleveland's PCP to be Jodi Guthrie and [employer] to be insurer.               Functional Status     Row Name 08/12/22 0913       Functional Status, IADL    Medications independent    Meal Preparation assistive person    Housekeeping assistive person    Laundry assistive person    Shopping assistive equipment and person               Psychosocial    No documentation.                Abuse/Neglect    No documentation.                Legal    No documentation.                Substance Abuse    No documentation.                Patient Forms    No documentation.                   German Whiting RN

## 2022-08-12 NOTE — DISCHARGE PLACEMENT REQUEST
"German CLAY, RN-BC  Case Management  P: [phone]  F: [phone]    Tila Cleveland (82 y.o. Female)             Date of Birth   1940    Social Security Number       Address   [address] [place] [address]    Home Phone   [phone]    MRN   [id number]       [facility]   Faith    Marital Status                               Admission Date   8/10/22    Admission Type   Urgent    Admitting Provider   Diego Curtis MD    Attending Provider   Diego Curtis MD    Department, Room/Bed   [address], S[address]/1       Discharge Date       Discharge Disposition   Home or Self Care    Discharge Destination                               Attending Provider: Diego Curtis MD    Allergies: Quinolones, Penicillins    Isolation: None   Infection: None   Code Status: CPR   Advance Care Planning Activity    Ht: 170.2 cm (67\")   Wt: 61.1 kg (134 lb 9.6 oz)    Admission Cmt: None   Principal Problem: None                Active Insurance as of 8/10/2022     Primary Coverage     Payor Plan Insurance Group Employer/Plan Group    AETNA MEDICARE REPLACEMENT AETNA MEDICARE REPLACEMENT [id number]     Payor Plan Address Payor Plan Phone Number Payor Plan Fax Number Effective Dates    [address] [phone]  2019 - None Entered    [facility] [id number]       Subscriber Name Subscriber Birth Date Member ID       Tila Cleveland 1940 [id number]                 [place] [id number] [facility] [address]  Phone:  [phone]  Fax:  [phone] Date: Aug 12, 2022      Ambulatory Referral to Home Health     Patient:  Tila Cleveland MRN:  [id number]   [address] [place] [address] :  1940  SSN:    Phone: [phone] Sex:  F      INSURANCE PAYOR PLAN GROUP # SUBSCRIBER ID   Primary:    AETNA MEDICARE REPLACEMENT [id number]      Referring Provider Information:  DIEGO CURTIS Phone: [phone] Fax: " [phone]       Referral Information:   # Visits:  999 Referral Type: Home Health [42]   Urgency:  Routine Referral Reason: Specialty Services Required   Start Date: Aug 12, 2022 End Date:  To be determined by Insurer   Diagnosis: 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 I will be treating the patient on an ongoing basis.  Please send me the Plan of Care for signature.  Follow-up provider: SALVATORE HAMM [[id number]]  Reason/Clinical Findings: STEMI  Describe mobility limitations that make leaving home difficult: impaired physical mobility and gait endruance  Nursing/Therapeutic Services Requested: Skilled Nursing  Nursing/Therapeutic Services Requested: Physical Therapy  Nursing/Therapeutic Services Requested: Occupational Therapy  Nursing/Therapeutic Services Requested: Speech Therapy  Skilled nursing orders: Medication education  PT orders: Therapeutic exercise  PT orders: Transfer training  PT orders: Strengthening  PT orders: Home safety assessment  Occupational orders: Activities of daily living  Occupational orders: Energy conservation  Occupational orders: Strengthening  Occupational orders: Home safety assessment  SLP orders: Dysphagia therapy  Frequency: 1 Week 1     This document serves as a request of services and does not constitute Insurance authorization or approval of services.  To determine eligibility, please contact the members Insurance carrier to verify and review coverage.     If you have medical questions regarding this request for services. Please contact [address] at [phone] during normal business hours.        Authorizing Provider:Diego Curtis MD  Authorizing Provider's NPI:  [id number]  Order Entered By: German Whiting RN 8/12/2022 11:32 AM     Electronically signed by: Diego Curtis MD 8/12/2022 11:32 AM       Emergency Contacts      (Rel.) Home Phone Work Phone Mobile Phone    SWATI (POA)ABY (Grandchild) [phone] -- [phone]    CEDRICK QUIÑONEZ (Relative) [phone] -- --    ColletteAmira (Grandchild) [phone] -- [phone]               History & Physical      Diego Curtis MD at 08/10/22 1100           [facility] Cardiology   [facility] [facility], [address]  [place], [place], [address] [phone]  [email or web address] [place]           HISTORY AND PHYSICAL NOTE    Patient Care Team:  Patient Care Team:  Jodi Guthrie APRN as PCP - General (Nurse Practitioner)      Chief complaint: STEMI         HPI:    Tila Cleveland is a 82 y.o. female.    Cardiac focused problem list:  1. STEMI  a. [facility] 8/9/2022: 100% stenosis of the RCA, 70 to 80% LAD stenosis, 40 to 50% OM stenosis, EF 50 to 55%.  Status post balloon angioplasty to the RCA.  b. Transfer to [facility] for possible atherectomy  2. CAD  a. Remote PCI approximately 20 years ago (data deficient)  3. Hypertension  4. Hypothyroidism  5. Lung nodule     Patient presents today as a transfer from [facility].  She presented to the ED at [facility] yesterday with onset of acute chest pain.  Initial EMS field EKG revealing inferior STEMI with sinus bradycardia.  Did have significant ST elevations in the inferior leads with reciprocal changes suggestive of inferior STEMI at [facility].  She was taken to the Cath Lab and underwent left heart catheterization revealing 70 to 80% stenosis in the LAD, 40 to 50% stenosis to the OM, 100% stenosis of the RCA, EF 50 to 55%.  Status post balloon angioplasty of the proximal RCA which continue to have dilatable heavy calcification needing atherectomy and debulking prior to PCI.  Case was discussed with Dr. Curtis who recommended patient be transferred to  [facility] for possible atherectomy of proximal RCA.    Patient reports she has had increased fatigue and weakness for the last several weeks.  She woke up yesterday morning with acute onset of chest pain which persisted so she called 911.  She currently denies chest pain, palpitations, shortness of breath, lower extremity edema, lightheadedness or syncope.     Review of Systems:  Positive for weakness, fatigue, chest pain.   All other systems reviewed and are negative.    PFSH:  Patient Active Problem List   Diagnosis   • Left lower lobe pulmonary nodule   • ST elevation myocardial infarction (STEMI), unspecified artery (HCC)       Current Facility-Administered Medications on File Prior to Encounter   Medication Dose Route Frequency Provider Last Rate Last Admin   • [COMPLETED] sodium chloride 0.9 % infusion    Continuous PRN SubramaniyamAllen MD 75 mL/hr at 08/09/22 1409 75 mL/hr at 08/09/22 1409   • [DISCONTINUED] acetaminophen (TYLENOL) tablet 650 mg  650 mg Oral Q4H PRN SubramaniyaAllen cornejo MD       • [DISCONTINUED] ALPRAZolam (XANAX) tablet 0.5 mg  0.5 mg Oral Nightly PRN BayronaniyamAllen MD       • [DISCONTINUED] ALPRAZolam (XANAX) tablet 1 mg  1 mg Oral Nightly PRN Subramaniyam, Allen Santacruz MD       • [DISCONTINUED] aspirin EC tablet 81 mg  81 mg Oral Daily Subramaniyam, Allen Santacruz MD       • [DISCONTINUED] atorvastatin (LIPITOR) tablet 40 mg  40 mg Oral Nightly SubramaniyamAllen MD   40 mg at 08/09/22 2101   • [DISCONTINUED] atropine injection    PRN MilaramaniyamAllen MD   0.5 mg at 08/09/22 1352   • [DISCONTINUED] fentaNYL citrate (PF) (SUBLIMAZE) injection    PRN MilaramaniyamAllen MD   25 mcg at 08/09/22 1355   • [DISCONTINUED] heparin (porcine) 5000 UNIT/ML injection 1,900 Units  30 Units/kg Intravenous PRN Allen Jimenes MD       • [DISCONTINUED] heparin (porcine) 5000 UNIT/ML injection 3,700 Units  60 Units/kg Intravenous  Once Jaill, Allen Santacruz MD       • [DISCONTINUED] heparin (porcine) 5000 UNIT/ML injection 3,900 Units  60 Units/kg Intravenous PRN Allen Jimenes MD       • [DISCONTINUED] heparin (porcine) injection    PRN Allen Jimenes MD   2,000 Units at 08/09/22 1441   • [DISCONTINUED] heparin 83678 units/250 mL (100 units/mL) in 0.9% NaCl infusion  12 Units/kg/hr Intravenous Titrated Allen Jimenes MD 7.7 mL/hr at 08/10/22 0307 12 Units/kg/hr at 08/10/22 0307   • [DISCONTINUED] HYDROcodone-acetaminophen (NORCO)  MG per tablet 1 tablet  1 tablet Oral Q8H PRN Allen Jimenes MD   1 tablet at 08/10/22 0306   • [DISCONTINUED] iopamidol (ISOVUE-370) 76 % injection    PRN Allen Jimenes MD   135 mL at 08/09/22 1443   • [DISCONTINUED] isosorbide dinitrate (ISORDIL) tablet 30 mg  30 mg Oral BID SubramaniAllen arora MD   30 mg at 08/09/22 2101   • [DISCONTINUED] levothyroxine (SYNTHROID, LEVOTHROID) tablet 75 mcg  75 mcg Oral Daily SubramcristinayamAllen MD       • [DISCONTINUED] levothyroxine (SYNTHROID, LEVOTHROID) tablet 75 mcg  75 mcg Oral Q AM Allen Jimenes MD       • [DISCONTINUED] lidocaine (XYLOCAINE) 2% injection    PRN Allen Jimenes MD   2 mL at 08/09/22 1354   • [DISCONTINUED] lisinopril (PRINIVIL,ZESTRIL) tablet 5 mg  5 mg Oral Daily SubramaniyamAllen MD       • [DISCONTINUED] midazolam (VERSED) injection    PRN Allen Jimenes MD   1 mg at 08/09/22 1355   • [DISCONTINUED] nitroglycerin 100 mcg/mL in D5W syringe    PRN Allen Jimenes MD   200 mcg at 08/09/22 1429   • [DISCONTINUED] O2 (OXYGEN)    PRN Allen Jimenes MD   2 L at 08/09/22 1346   • [DISCONTINUED] phenylephrine (JEWEL-SYNEPHRINE) injection    PRN Allen Jimenes MD   200 mcg at 08/09/22 1400   • [DISCONTINUED] sodium chloride 0.9 % flush 10 mL  10 mL Intravenous PRN  NbamAllen MD       • [DISCONTINUED] sodium chloride 0.9 % flush 10 mL  10 mL Intravenous Q12H NbamAllen MD   10 mL at 08/09/22 2106   • [DISCONTINUED] sodium chloride 0.9 % flush 10 mL  10 mL Intravenous PRN NbamAllen MD       • [DISCONTINUED] sodium chloride 0.9 % infusion  100 mL/hr Intravenous Continuous NbamAllen  mL/hr at 08/10/22 0626 100 mL/hr at 08/10/22 0626   • [DISCONTINUED] ticagrelor (BRILINTA) tablet 180 mg  180 mg Oral Once SubramAllen chamberlain MD       • [DISCONTINUED] ticagrelor (BRILINTA) tablet 90 mg  90 mg Oral BID Allen Jimenes MD   90 mg at 08/09/22 2101   • [DISCONTINUED] ticagrelor (BRILINTA) tablet    PRN Allen Jimenes MD   180 mg at 08/09/22 1436   • [DISCONTINUED] verapamil (ISOPTIN) injection    PRN Allen Jimenes MD   2.5 mg at 08/09/22 1355     Current Outpatient Medications on File Prior to Encounter   Medication Sig Dispense Refill   • ALPRAZolam (XANAX) 1 MG tablet Take 0.5 mg by mouth Every Night.     • amLODIPine (NORVASC) 5 MG tablet Take 5 mg by mouth Every Night.     • dextromethorphan polistirex ER (DELSYM) 30 MG/5ML Suspension Extended Release oral suspension Take 30 mg by mouth Every 12 (Twelve) Hours As Needed (cough).     • dicyclomine (Bentyl) 10 MG capsule Take 1 capsule by mouth 4 (Four) Times a Day Before Meals & at Bedtime. 120 capsule 0   • HYDROcodone-acetaminophen (NORCO)  MG per tablet Take 1 tablet by mouth Every 8 (Eight) Hours As Needed for Moderate Pain .     • isosorbide dinitrate (ISORDIL) 30 MG tablet Take 30 mg by mouth 2 (Two) Times a Day.     • levothyroxine (SYNTHROID, LEVOTHROID) 75 MCG tablet Take 1 tablet by mouth Daily. 30 tablet 0   • metoprolol succinate XL (TOPROL-XL) 25 MG 24 hr tablet Take 25 mg by mouth Daily.       Allergies   Allergen Reactions   • Quinolones Other (See Comments)     Can not recall  reaction but she had to go to the hospital.   • Penicillins Rash       Social History     Socioeconomic History   • Marital status:    • Number of children: 3   Tobacco Use   • Smoking status: Former Smoker     Packs/day: 1.00     Years: 20.00     Pack years: 20.00     Types: Cigarettes     Quit date:      Years since quittin.6   • Smokeless tobacco: Current User     Types: Snuff   Vaping Use   • Vaping Use: Never used   Substance and Sexual Activity   • Alcohol use: No   • Drug use: No   • Sexual activity: Defer     Family History   Problem Relation Age of Onset   • Diabetes Mother    • Stroke Mother    • Lung cancer Father             Objective:     Vital Sign Min/Max for last 24 hours  Temp  Min: 97.4 °F (36.3 °C)  Max: 98.4 °F (36.9 °C)   BP  Min: 95/87  Max: 154/95   Pulse  Min: 46  Max: 81   Resp  Min: 12  Max: 22   SpO2  Min: 83 %  Max: 100 %   Flow (L/min)  Min: 2  Max: 2    No intake or output data in the 24 hours ending 08/10/22 1100        There were no vitals filed for this visit.    CONSTITUTIONAL: Well-nourished. In no acute distress.   SKIN: Warm and dry. No rashes noted  HEENT: Head is normocephalic and atraumatic. Mucous membranes are pink and moist.   LUNGS: Normal effort. Clear to auscultation bilaterally without wheezing, rhonchi, or rales noted.   CARDIOVASCULAR: Regular rate and rhythm with a normal S1 and S2. There is no murmur, gallop, rub, or click appreciated.   PERIPHERAL VASCULAR: Carotid upstroke is 2+ bilaterally and without bruits. Radial pulses are 2+ bilaterally. Right femoral pulse 2+. There is no lower extremity edema. Right radial access site without bruising or bleeding. Right DP 1-2+  ABDOMEN: Normal bowel sounds.  Soft with no tenderness with palpitation.   MUSCULOSKELETAL:  No digital cyanosis  NEUROLOGICAL: Nonfocal.  PSYCHIATRIC: Alert, orientated x 3, appropriate affect and mood    Lab results reviewed by myself:  Lab Results   Component Value Date     CKTOTAL 58 04/21/2022    TROPONINT 0.029 08/09/2022       Lab Results   Component Value Date    CHOL 139 08/10/2022     Lab Results   Component Value Date    TRIG 200 (H) 08/10/2022     Lab Results   Component Value Date    HDL 41 08/10/2022     Lab Results   Component Value Date    LDL 65 08/10/2022     No components found for: LDLDIRECTC    Diagnostic Data:    EKG 8/09/2022:  NSR with T wave abnormality    Results for orders placed during the hospital encounter of 09/21/19    Adult Transthoracic Echo Complete W/ Cont if Necessary Per Protocol    Interpretation Summary  · Left ventricular systolic function is normal. Estimated EF appears to be in the range of 56 - 60%.  · Left ventricular diastolic dysfunction (grade I) consistent with impaired relaxation.  · Normal cardiac chamber dimensions.  · The aortic valve is structurally normal. No aortic valve regurgitation is present. No aortic valve stenosis is present.  · Trace-to-mild mitral valve regurgitation is present. No significant mitral valve stenosis is present  · Mild tricuspid valve regurgitation is present. No evidence of pulmonary hypertension is present  · The pulmonic valve is not well visualized  · There is no evidence of pericardial effusion  · The study is technically difficult for diagnosis.    [facility] 8/09/2022  · CAD proximal % occlusion s/ p POBA with 2.0/2.5 and 3.0 trek balloon, heavily calcified lesion, ballon undilatable, will need atherectomy prior to stenting.   · LAD mid high grade calcified 80% stenosis bifurcation lesion at D1 take off which also had tandem 79-80% stenosis in proximal, OM artery mild to moderate 50% tubular stenosis in mid.         Tele:  NSR         Assessment and Plan:     Problem list:    * No active hospital problems. *      ASSESSMENT:  1. STEMI/CAD  a. Prior remote PCI (data deficient)  b. [facility] 8/09/2022 revealing 100% RCA stenosis status post balloon angioplasty with 80% post PCI stenosis. Also with a residual  70% calcified mid LAD stenosis  2. Hypertension  3. Bradycardia   4. Hypothyroidism   5. Solitary pulmonary nodule  a. Stable per CT imaging.     PLAN:  1. The risks, benefits, and alternative options of cardiac catheterization were discussed with the patient and her daughter at the bedside.  The risks include death, MI, stroke, infection, vascular injury requiring surgical repair and/or blood transfusion, coronary dissection, renal dysfunction/failure, allergic reaction, emergent CABG.  If PCI is needed there is a 1-2% risk of emergent CABG. In particular, the patient's RCA is heavily calcified and was not dilatable by balloon angioplasty at [facility].  Coronary atherectomy is planned for today's procedure.  The unique risks associated with atherectomy were discussed with the patient and her daughter.  She and her daughter understand and are agreeable to proceed.  2. Left heart catheterization today with planned atherectomy to the RCA and possible temporary pacer wire insertion with Dr. Curtis today via right femoral approach. Right femoral pulse 2+.   3. Echocardiogram to assess for structural or functional abnormalities.   4. Aspirin 81 mg daily.  5. Continue Brilinta 90 mg twice daily.  6. Add Crestor 10 mg   7. Gentle hydration with NS at 75 ml/hr.   8. Stop heparin gtt   9. Will admit to cardiology service after cath.      Scribed for Dr. Curtis by Linette Faye, YAMILET. 8/10/2022  11:00 EDT    I, Diego Curtis MD, personally performed the services as scribed by the above named individual. I have made any necessary edits and it is both accurate and complete.     Diego Curtis MD, MSc, FACC, [facility]  Interventional Cardiology  [facility]              Electronically signed by Diego Curtis MD at 08/10/22 6513          Physical Therapy Notes (most recent note)      Elmira Villalba at 22 1338  Version 1 of 1         Patient Name: Tila Cleveland  : 1940    MRN: [id number]                               Today's Date: 8/11/2022       Admit Date: 8/10/2022    Visit Dx:     ICD-10-CM ICD-9-CM   1. 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 (HCC)  I25.700 414.05     411.1     Patient Active Problem List   Diagnosis   • Left lower lobe pulmonary nodule   • ST elevation myocardial infarction (STEMI), unspecified artery (HCC)   • STEMI (ST elevation myocardial infarction) (HCC)     Past Medical History:   Diagnosis Date   • Disease of thyroid gland    • Hypertension    • Hypothyroidism    • Lung nodule      Past Surgical History:   Procedure Laterality Date   • CARDIAC CATHETERIZATION N/A 08/09/2022    Procedure: Left Heart Cath;  Surgeon: Allen Jimenes MD;  Location:  [facility];  Service: Cardiovascular;  Laterality: N/A;   • CARDIAC CATHETERIZATION N/A 08/09/2022    Procedure: Percutaneous Coronary Intervention;  Surgeon: Allen Jimenes MD;  Location:  [facility];  Service: Cardiovascular;  Laterality: N/A;   • CARDIAC CATHETERIZATION N/A 8/10/2022    Procedure: PERCUTANEOUS CORONARY INTERVENTION of RCA with possible rota;  Surgeon: Diego Curtis MD;  Location:  [facility];  Service: Cardiology;  Laterality: N/A;   • THYROIDECTOMY, PARTIAL        General Information     Row Name 08/11/22 1423          Physical Therapy Time and Intention    Document Type evaluation  -ML     Mode of Treatment physical therapy  -ML     Row Name 08/11/22 1423          General Information    Patient Profile Reviewed yes  -ML     Prior Level of Function independent:;all household mobility;community mobility;gait;transfer  Patient ambulated with rollator, assist from family with transfer in and out of tub. HHA when ascending/descending stairs  -ML     Existing Precautions/Restrictions fall  -ML     Barriers to Rehab medically complex  -ML     Row Name 08/11/22 1423          Living  Environment    People in Home grandchild(raisa)  -ML     Row Name 08/11/22 1423          Home Main Entrance    Number of Stairs, Main Entrance five  -ML     Stair Railings, Main Entrance railing on left side (ascending)  -ML     Row Name 08/11/22 1423          Stairs Within Home, Primary    Number of Stairs, Within Home, Primary none  -ML     Row Name 08/11/22 1423          Cognition    Orientation Status (Cognition) oriented x 4  -ML     Row Name 08/11/22 1423          Safety Issues, Functional Mobility    Safety Issues Affecting Function (Mobility) awareness of need for assistance;insight into deficits/self-awareness  -     Impairments Affecting Function (Mobility) balance;endurance/activity tolerance;shortness of breath;strength  -ML           User Key  (r) = Recorded By, (t) = Taken By, (c) = Cosigned By    Initials Name Provider Type     Elmira Villalba Physical Therapist               Mobility     Row Name 08/11/22 1425          Bed Mobility    Bed Mobility supine-sit  -ML     Supine-Sit [place] (Bed Mobility) contact guard;verbal cues;nonverbal cues (demo/gesture);1 person assist  -ML     Assistive Device (Bed Mobility) bed rails;head of bed elevated  -ML     Row Name 08/11/22 1425          Bed-Chair Transfer    Bed-Chair [place] (Transfers) contact guard;verbal cues;nonverbal cues (demo/gesture);1 person assist  -ML     Assistive Device (Bed-Chair Transfers) walker, front-wheeled  -ML     Comment, (Bed-Chair Transfer) Patient took several steps to transfer from EOB to chair with RW.  -ML     Row Name 08/11/22 1425          Sit-Stand Transfer    Sit-Stand [place] (Transfers) verbal cues;contact guard;1 person assist  -ML     Assistive Device (Sit-Stand Transfers) walker, front-wheeled  -ML     Comment, (Sit-Stand Transfer) Verbal cues for corerct hand placement  -ML     Row Name 08/11/22 1425          Gait/Stairs (Locomotion)    [place] Level (Gait) contact guard;1 person assist  -ML      Assistive Device (Gait) walker, front-wheeled  -ML     Distance in Feet (Gait) 3  -ML     Deviations/Abnormal Patterns (Gait) bilateral deviations;lacey decreased;gait speed decreased  -ML     Bilateral Gait Deviations forward flexed posture  -ML     Comment, (Gait/Stairs) Patient required additional time with transfers and ambulation to chair.  -ML           User Key  (r) = Recorded By, (t) = Taken By, (c) = Cosigned By    Initials Name Provider Type    ML Elmira Villalba Physical Therapist               Obj/Interventions     Row Name 08/11/22 1429          Range of Motion Comprehensive    General Range of Motion no range of motion deficits identified  -ML     Row Name 08/11/22 1429          Strength Comprehensive (MMT)    General Manual Muscle Testing (MMT) Assessment lower extremity strength deficits identified  -ML     Comment, General Manual Muscle Testing (MMT) Assessment BLE grossly 3/5  -ML     Row Name 08/11/22 1429          Motor Skills    Motor Skills functional endurance  -ML     Functional Endurance decreased funcitonal endurance, unable to ambulate out of room  -ML     Row Name 08/11/22 1429          Balance    Balance Assessment sitting static balance;sitting dynamic balance;sit to stand dynamic balance;standing static balance;standing dynamic balance  -ML     Static Sitting Balance supervision  -ML     Dynamic Sitting Balance contact guard  -ML     Position, Sitting Balance unsupported;sitting edge of bed  -ML     Sit to Stand Dynamic Balance contact guard;verbal cues;1-person assist  -ML     Static Standing Balance contact guard;verbal cues  -ML     Dynamic Standing Balance contact guard;verbal cues  -ML     Position/Device Used, Standing Balance supported;walker, rolling  -ML     Balance Interventions sitting;standing;sit to stand;supported;static;dynamic  -ML           User Key  (r) = Recorded By, (t) = Taken By, (c) = Cosigned By    Initials Name Provider Type    ML Elmira Villalba Physical Therapist                Goals/Plan     Row Name 08/11/22 1436          Bed Mobility Goal 1 (PT)    Activity/Assistive Device (Bed Mobility Goal 1, PT) sit to supine/supine to sit  -ML     Drew Level/Cues Needed (Bed Mobility Goal 1, PT) modified independence  -ML     Time Frame (Bed Mobility Goal 1, PT) 10 days  -ML     Row Name 08/11/22 1436          Transfer Goal 1 (PT)    Activity/Assistive Device (Transfer Goal 1, PT) sit-to-stand/stand-to-sit;bed-to-chair/chair-to-bed;walker, rolling  -ML     Drew Level/Cues Needed (Transfer Goal 1, PT) modified independence  -ML     Time Frame (Transfer Goal 1, PT) 10 days  -ML     Row Name 08/11/22 1436          Gait Training Goal 1 (PT)    Activity/Assistive Device (Gait Training Goal 1, PT) gait (walking locomotion);improve balance and speed;increase endurance/gait distance;walker, rolling  -ML     Drew Level (Gait Training Goal 1, PT) modified independence  -ML     Distance (Gait Training Goal 1, PT) 200  -ML     Time Frame (Gait Training Goal 1, PT) 10 days  -ML     Row Name 08/11/22 1436          Stairs Goal 1 (PT)    Activity/Assistive Device (Stairs Goal 1, PT) ascending stairs;descending stairs;using handrail, left  -ML     Drew Level/Cues Needed (Stairs Goal 1, PT) modified independence  -ML     Number of Stairs (Stairs Goal 1, PT) 5  -ML     Time Frame (Stairs Goal 1, PT) 10 days  -ML     Row Name 08/11/22 1436          Therapy Assessment/Plan (PT)    Planned Therapy Interventions (PT) bed mobility training;gait training;home exercise program;patient/family education;postural re-education;strengthening;transfer training  -ML           User Key  (r) = Recorded By, (t) = Taken By, (c) = Cosigned By    Initials Name Provider Type    Elmira Dickson Physical Therapist               Clinical Impression     Row Name 08/11/22 1430          Pain    Pretreatment Pain Rating 0/10 - no pain  -ML     Posttreatment Pain Rating 0/10 - no pain  -ML     Row  Name 08/11/22 1430          Plan of Care Review    Plan of Care Reviewed With patient;grandchild(raisa)  -ML     Outcome Evaluation Physical therapy consult received. Patient evaluation complete. The patient requried additional time to complete transfer and OOB transfer to chair. Patient required CGA for sit to stand transfer and CGA for transfer from EOB to chair with RW. Patient with forward flexed posture and limited activity tolerance. Patient with good family support at home. Patient would continue to benefit from skilled PT to progress strength, functional mobility and activity tolerance. At hospital discharge recommend IPR.  -ML     Row Name 08/11/22 1430          Therapy Assessment/Plan (PT)    Patient/Family Therapy Goals Statement (PT) improve mobility and strength  -ML     Rehab Potential (PT) good, to achieve stated therapy goals  -ML     Criteria for Skilled Interventions Met (PT) yes;meets criteria;skilled treatment is necessary  -ML     Therapy Frequency (PT) daily  -ML     Row Name 08/11/22 1430          Vital Signs    Pre Systolic BP Rehab 106  -ML     Pre Treatment Diastolic BP 59  -ML     Intra Systolic BP Rehab 106  -ML     Intra Treatment Diastolic BP 70  -ML     Post Systolic BP Rehab 115  -ML     Post Treatment Diastolic BP 75  -ML     Pretreatment Heart Rate (beats/min) 73  -ML     Posttreatment Heart Rate (beats/min) 78  -ML     Pre SpO2 (%) 95  -ML     O2 Delivery Pre Treatment room air  -ML     Post SpO2 (%) 94  -ML     O2 Delivery Post Treatment room air  -ML     Pre Patient Position Supine  -ML     Intra Patient Position Standing  -ML     Post Patient Position Sitting  -ML     Row Name 08/11/22 1430          Positioning and Restraints    Pre-Treatment Position in bed  -ML     Post Treatment Position chair  -ML     In Chair notified nsg;sitting;call light within reach;encouraged to call for assist;exit alarm on;with family/caregiver  -           User Key  (r) = Recorded By, (t) = Taken  By, (c) = Cosigned By    Initials Name Provider Type    Elmira Dickson Physical Therapist               Outcome Measures     Row Name 08/11/22 1437 08/11/22 0800       How much help from another person do you currently need...    Turning from your back to your side while in flat bed without using bedrails? 3  -ML 3  -KZ    Moving from lying on back to sitting on the side of a flat bed without bedrails? 3  -ML 3  -KZ    Moving to and from a bed to a chair (including a wheelchair)? 3  -ML 2  -KZ    Standing up from a chair using your arms (e.g., wheelchair, bedside chair)? 3  -ML 3  -KZ    Climbing 3-5 steps with a railing? 2  -ML 2  -KZ    To walk in hospital room? 2  -ML 2  -KZ    AM-PAC 6 Clicks Score (PT) 16  -ML 15  -KZ    Highest level of mobility 5 --> Static standing  -ML 4 --> Transferred to chair/commode  -KZ    Row Name 08/11/22 1437          Functional Assessment    Outcome Measure Options AM-PAC 6 Clicks Basic Mobility (PT)  -ML           User Key  (r) = Recorded By, (t) = Taken By, (c) = Cosigned By    Initials Name Provider Type    Germania Vega RN Registered Nurse    Elmira Dickson Physical Therapist                             Physical Therapy Education                 Title: PT OT SLP Therapies (Done)     Topic: Physical Therapy (Done)     Point: Mobility training (Done)     Learning Progress Summary           Patient Acceptance, E, VU,DU by ML at 8/11/2022 1437   Family Acceptance, E, VU,DU by ML at 8/11/2022 1437                   Point: Home exercise program (Done)     Learning Progress Summary           Patient Acceptance, E, VU,DU by ML at 8/11/2022 1437   Family Acceptance, E, VU,DU by ML at 8/11/2022 1437                   Point: Body mechanics (Done)     Learning Progress Summary           Patient Acceptance, E, VU,DU by ML at 8/11/2022 1437   Family Acceptance, E, VU,DU by ML at 8/11/2022 1437                   Point: Precautions (Done)     Learning Progress Summary            Patient Acceptance, E, VU,DU by  at 8/11/2022 1437   Family Acceptance, E, VU,DU by  at 8/11/2022 1437                               User Key     Initials Effective Dates Name Provider Type Discipline     04/22/21 -  Elmira Villalba Physical Therapist PT              PT Recommendation and Plan  Planned Therapy Interventions (PT): bed mobility training, gait training, home exercise program, patient/family education, postural re-education, strengthening, transfer training  Plan of Care Reviewed With: patient, grandchild(raisa)  Outcome Evaluation: Physical therapy consult received. Patient evaluation complete. The patient requried additional time to complete transfer and OOB transfer to chair. Patient required CGA for sit to stand transfer and CGA for transfer from EOB to chair with RW. Patient with forward flexed posture and limited activity tolerance. Patient with good family support at home. Patient would continue to benefit from skilled PT to progress strength, functional mobility and activity tolerance. At hospital discharge recommend IPR.     Time Calculation:    PT Charges     Row Name 08/11/22 1440             Time Calculation    Start Time 1338  -ML      PT Received On 08/11/22  -ML      PT Goal Re-Cert Due Date 08/21/22  -ML              Timed Charges    01806 - PT Therapeutic Activity Minutes 15  -ML              Untimed Charges    PT Eval/Re-eval Minutes 46  -ML              Total Minutes    Timed Charges Total Minutes 15  -ML      Untimed Charges Total Minutes 46  -ML       Total Minutes 61  -ML            User Key  (r) = Recorded By, (t) = Taken By, (c) = Cosigned By    Initials Name Provider Type     Elmira Villalba Physical Therapist              Therapy Charges for Today     Code Description Service Date Service Provider Modifiers Qty    31023445381 HC PT THERAPEUTIC ACT EA 15 MIN 8/11/2022 Elmira Villalba GP 1    18588499161 HC PT EVAL MOD COMPLEXITY 4 8/11/2022 Elmira Villalba GP 1          PT  G-Codes  Outcome Measure Options: AM-PAC 6 Clicks Basic Mobility (PT)  AM-PAC 6 Clicks Score (PT): 16    Elmira Villalba  2022      Electronically signed by Elmira Villalba at 22 1441            Speech Language Pathology Notes (most recent note)      Nina Salinas MS, CFY-SLP at 22 1444          [facility] - Speech Language Pathology   Swallow Initial Evaluation [facility]   Clinical Swallow Evaluation       Patient Name: Tila Cleveland  : 1940  MRN: [id number]  Today's Date: 2022               Admit Date: 8/10/2022    Visit Dx:     ICD-10-CM ICD-9-CM   1. 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 Dysphagia, unspecified type  R13.10 787.20     Patient Active Problem List   Diagnosis   • Left lower lobe pulmonary nodule   • ST elevation myocardial infarction (STEMI), unspecified artery (HCC)   • STEMI (ST elevation myocardial infarction) ([facility])     Past Medical History:   Diagnosis Date   • Disease of thyroid gland    • Hypertension    • Hypothyroidism    • Lung nodule      Past Surgical History:   Procedure Laterality Date   • CARDIAC CATHETERIZATION N/A 2022    Procedure: Left Heart Cath;  Surgeon: Allen Jimenes MD;  Location:  [facility];  Service: Cardiovascular;  Laterality: N/A;   • CARDIAC CATHETERIZATION N/A 2022    Procedure: Percutaneous Coronary Intervention;  Surgeon: Allen Jimenes MD;  Location:  [facility] CATH [facility];  Service: Cardiovascular;  Laterality: N/A;   • CARDIAC CATHETERIZATION N/A 8/10/2022    Procedure: PERCUTANEOUS CORONARY INTERVENTION of RCA with possible rota;  Surgeon: Diego Curtis MD;  Location:  [facility];  Service: Cardiology;  Laterality: N/A;   • THYROIDECTOMY, PARTIAL         SLP Recommendation and Plan  SLP Swallowing Diagnosis: suspected pharyngeal dysphagia (22  0930)  SLP Diet Recommendation: regular textures, nectar thick liquids (08/11/22 0930)  Recommended Precautions and Strategies: no straw, general aspiration precautions (08/11/22 0930)  SLP Rec. for Method of Medication Administration: meds whole, with thick liquids, meds crushed, with pudding or applesauce, as tolerated (08/11/22 0930)     Monitor for Signs of Aspiration: yes, notify SLP if any concerns (08/11/22 0930)  Recommended Diagnostics: VFSS ([facility]), other (see comments) (8/12) (08/11/22 0930)  Swallow Criteria for Skilled Therapeutic Interventions Met: demonstrates skilled criteria (08/11/22 0930)  Anticipated Discharge Disposition (SLP): unknown (08/11/22 0930)  Rehab Potential/Prognosis, Swallowing: good, to achieve stated therapy goals (08/11/22 0930)     Predicted Duration Therapy Intervention (Days): until discharge (08/11/22 0930)                                         SWALLOW EVALUATION (last 72 hours)     SLP Adult Swallow Evaluation     Row Name 08/11/22 0930                   Rehab Evaluation    Document Type evaluation  -        Subjective Information no complaints  -        Patient Observations alert;cooperative  -        Patient/Family/Caregiver Comments/Observations No family present  -        Patient Effort good  -        Symptoms Noted During/After Treatment none  -                  General Information    Patient Profile Reviewed yes  -        Pertinent History Of Current Problem Adm from OSH for further cardiac intervention 2/2 STEMI. Hx: RCA stenosis s/p atherectomy (8/10), CAD, left lower lobe pulmonary nodule, HTN, HLD, disease of thyroid gland.  -        Current Method of Nutrition regular textures;nectar/syrup-thick liquids  -        Precautions/Limitations, Vision WFL;for purposes of eval  -        Precautions/Limitations, Hearing WFL;for purposes of eval  -        Prior Level of Function-Communication unknown  -        Prior Level of Function-Swallowing  regular textures;nectar thick liquids;other (see comments)  Recent MBS w/ recs for regular diet & NTL no straws  -        Plans/Goals Discussed with patient  -        Barriers to Rehab none identified  -        Patient's Goals for Discharge patient did not state  -        Family Goals for Discharge family did not state  -                  Pain    Additional Documentation Pain Scale: FACES Pre/Post-Treatment (Group)  -                  Pain Scale: FACES Pre/Post-Treatment    Pain: FACES Scale, Pretreatment 0-->no hurt  -        Posttreatment Pain Rating 0-->no hurt  -                  Oral Motor Structure and Function    Dentition Assessment missing teeth;teeth are in poor condition  -        Secretion Management WNL/WFL  -        Mucosal Quality moist, healthy  -                  Oral Musculature and Cranial Nerve Assessment    Oral Motor General Assessment WFL  -MH                  General Eating/Swallowing Observations    Respiratory Support Currently in Use room air  -        Eating/Swallowing Skills self-fed;fed by SLP  -        Positioning During Eating upright in bed  -        Utensils Used spoon;cup;straw  -        Consistencies Trialed ice chips;thin liquids;pureed;nectar/syrup-thick liquids;regular textures  -                  Clinical Swallow Eval    Pharyngeal Phase suspected pharyngeal impairment  -        Clinical Swallow Evaluation Summary Overt s/s of aspiration c/b intermittent throat clear w thin liquid via straw sip. No overt s/s w/ nectar thick liquids, pureed, or regular solid trials. Per chart review, pt is on nectar thick liquids at baseline.  Pt had MBS (7/1), as an outpatient w/ recs for regular solids and NTL, no straws. Silent aspiration w/ thin liquid and NTL via straw noted on study. SLP rec: regular solids and nectar thick liquids. Plan to proceed w/ MBS tomorrow to further assess swallow function.  -                  Pharyngeal Phase Concerns     Pharyngeal Phase Concerns throat clear  -        Throat Clear thin  -                  SLP Evaluation Clinical Impression    SLP Swallowing Diagnosis suspected pharyngeal dysphagia  -        Functional Impact risk of aspiration/pneumonia  -        Rehab Potential/Prognosis, Swallowing good, to achieve stated therapy goals  -        Swallow Criteria for Skilled Therapeutic Interventions Met demonstrates skilled criteria  -                  Recommendations    Predicted Duration Therapy Intervention (Days) until discharge  -        SLP Diet Recommendation regular textures;nectar thick liquids  -        Recommended Diagnostics VFSS (MBS);other (see comments)  8/12  -        Recommended Precautions and Strategies no straw;general aspiration precautions  -        Oral Care Recommendations Oral Care BID/PRN  -        SLP Rec. for Method of Medication Administration meds whole;with thick liquids;meds crushed;with pudding or applesauce;as tolerated  -        Monitor for Signs of Aspiration yes;notify SLP if any concerns  -        Anticipated Discharge Disposition (SLP) unknown  -              User Key  (r) = Recorded By, (t) = Taken By, (c) = Cosigned By    Initials Name Effective Dates     Nina Salinas MS, CFY-SLP 06/22/22 -                 EDUCATION  The patient has been educated in the following areas:   Dysphagia (Swallowing Impairment) Modified Diet Instruction.              Time Calculation:    Time Calculation- SLP     Row Name 08/11/22 1444             Time Calculation- [facility]    SLP Start Time 0910  -      SLP Received On 08/11/22  -              Untimed Charges    10854-WW Eval Oral Pharyng Swallow Minutes 50  -              Total Minutes    Untimed Charges Total Minutes 50  -       Total Minutes 50  -            User Key  (r) = Recorded By, (t) = Taken By, (c) = Cosigned By    Initials Name Provider Type    Nina hSultz MS, JEANCARLOS-SLP Speech and Language Pathologist                 Therapy Charges for Today     Code Description Service Date Service Provider Modifiers Qty    86334864495 HC ST EVAL ORAL PHARYNG SWALLOW 3 8/11/2022 Nina Salinas MS, CFY-SLP GN 1            Patient was not wearing a face mask and did not exhibit coughing during this therapy encounter.  Procedure performed was not aerosolizing, did not involve close contact (within 6 feet for at least 15 minutes or longer), and did not involve contact with infectious secretions or specimens.  Therapist used appropriate personal protective equipment including gloves, standard procedure mask and eye protection.  Appropriate PPE was worn during the entire therapy session.  Hand hygiene was completed before and after therapy session.       Nina Salinas MS, CFY-SLP  8/11/2022    Electronically signed by Nina Salinas MS, CFY-SLP at 08/11/22 4239

## 2022-08-12 NOTE — PLAN OF CARE
Goal Outcome Evaluation:  Plan of Care Reviewed With: patient        Progress: improving  Outcome Evaluation: Pt alert and oriented x3, disoriented to situation at times. VSS on room air. Pt has gotten up with standby to bed side commode this shift. NSR. Tolerating thickened liquids, pills whole. Pt voices no needs at this time, call light within reach.

## 2022-08-12 NOTE — DISCHARGE SUMMARY
Cardiology Discharge Note    Patient Name:  Tila Cleveland  Date of Admission:  8/10/2022  Date of Discharge:  8/12/2022    Discharge Diagnosis: Status post inferior STEMI    Problem List:    STEMI (ST elevation myocardial infarction) (HCC)      Presenting Problem/History of Present Illness:  History of ST elevation myocardial infarction (STEMI) [I25.2]  STEMI (ST elevation myocardial infarction) (HCC) [I21.3]    Chest pain    Hospital Course:  Patient is a 82 y.o. female presented with with chest pain on the day of admission to [facility].  There she was found in route to be having an acute inferior STEMI.  She was taken to the cardiac Cath Lab there and had a balloon angioplasty to open the artery but the patient had heavy calcification.  She was transferred here and underwent an atherectomy balloon angioplasty and stenting of the right coronary artery with excellent angiographic result.  The note from yesterday stated the patient was short of breath.  She states she feels well now.  She is not short of breath at all.  She would like to go home..        Physical Exam:  Temp:  [98 °F (36.7 °C)-98.4 °F (36.9 °C)] 98 °F (36.7 °C)  Heart Rate:  [72-94] 79  Resp:  [15-19] 18  BP: (101-150)/(56-84) 150/83    Neck: No JVP    Cardiovascular: Regular rate and rhythm        Respiratory: Clear bilaterally    Neurologic: Facial expressions are symmetrical      Procedures Performed: 1 cardiac cath with rotational atherectomy, stenting of the right coronary artery      Consults:   Consults     No orders found from 7/12/2022 to 8/11/2022.            Condition on Discharge: Improved      Discharge Disposition: Home    Discharge Medications:     Discharge Medications      New Medications      Instructions Start Date   aspirin 81 MG EC tablet   81 mg, Oral, Daily      rosuvastatin 10 MG tablet  Commonly known as: CRESTOR   10 mg, Oral, Nightly      ticagrelor 90 MG tablet tablet  Commonly known as: BRILINTA   90 mg, Oral,  Every 12 Hours Scheduled         Changes to Medications      Instructions Start Date   metoprolol succinate XL 25 MG 24 hr tablet  Commonly known as: TOPROL-XL  What changed: how much to take   12.5 mg, Oral, Daily         Continue These Medications      Instructions Start Date   ALPRAZolam 1 MG tablet  Commonly known as: XANAX   0.5 mg, Oral, Nightly      amLODIPine 5 MG tablet  Commonly known as: NORVASC   5 mg, Oral, Nightly      dextromethorphan polistirex ER 30 MG/5ML Suspension Extended Release oral suspension  Commonly known as: DELSYM   30 mg, Oral, Every 12 Hours PRN      dicyclomine 10 MG capsule  Commonly known as: Bentyl   10 mg, Oral, 4 Times Daily Before Meals & Nightly      HYDROcodone-acetaminophen  MG per tablet  Commonly known as: NORCO   1 tablet, Oral, Every 8 Hours PRN      isosorbide dinitrate 30 MG tablet  Commonly known as: ISORDIL   30 mg, Oral, 2 Times Daily      levothyroxine 75 MCG tablet  Commonly known as: SYNTHROID, LEVOTHROID   75 mcg, Oral, Daily             Discharge Diet: Healthy heart    Activity at Discharge: No restrictions    Follow-up Appointments: Follow-up with Dr. Avitia 4 weeks to be scheduled for catheter-based intervention of the LAD      Time: 20 minutes    Eddie Bashir MD,  8/12/2022 - 08:05 EDT

## 2022-08-12 NOTE — PLAN OF CARE
Goal Outcome Evaluation:  Plan of Care Reviewed With: patient        Progress: no change   SLP evaluation completed. Will continue to address dysphagia in tx. Please see note for further details and recommendations.

## 2022-08-13 NOTE — OUTREACH NOTE
Prep Survey    Flowsheet Row Responses   [facility] facility patient discharged from? [facility]   Is LACE score < 7 ? No   Emergency Room discharge w/ pulse ox? No   Eligibility Readm Mgmt   Discharge diagnosis  STEMI   Does the patient have one of the following disease processes/diagnoses(primary or secondary)? Acute MI (STEMI,NSTEMI)   Does the patient have Home health ordered? Yes   What is the Home health agency?  [facility]   Is there a DME ordered? No   Prep survey completed? Yes          RADHA EDDY - Registered Nurse

## 2022-08-15 ENCOUNTER — READMISSION MANAGEMENT (OUTPATIENT)
Dept: CALL CENTER | Facility: HOSPITAL | Age: 82
End: 2022-08-15

## 2022-08-15 NOTE — OUTREACH NOTE
AMI Week 1 Survey    Flowsheet Row Responses   [facility] patient discharged from? [facility]   Does the patient have one of the following disease processes/diagnoses(primary or secondary)? Acute MI (STEMI,NSTEMI)   Week 1 attempt successful? Yes   Call start time 0907   Call end time 0910   Discharge diagnosis  STEMI   Is patient permission given to speak with other caregiver? Yes   List who call center can speak with Nisreen   Person spoke with today (if not patient) and relationship Nisreen   Meds reviewed with patient/caregiver? Yes   Is the patient having any side effects they believe may be caused by any medication additions or changes? No   Does the patient have all prescriptions related to this admission filled (includes statins,anticoagulants,HTN meds,anti-arrhythmia meds) N/A   Is the patient taking all medications as directed (includes completed medication regime)? Yes   Does the patient have a primary care provider?  Yes   Does the patient have an appointment with their PCP,cardiologist,or clinic within 7 days of discharge? No   What is preventing the patient from scheduling follow up appointments within 7 days of discharge? Haven't had time   Nursing Interventions Educated patient on importance of making appointment, Advised patient to make appointment   Has the patient kept scheduled appointments due by today? N/A   What is the Home health agency?  [facility]   Has home health visited the patient within 72 hours of discharge? Yes   Psychosocial issues? No   Did the patient receive a copy of their discharge instructions? Yes   Nursing interventions Reviewed instructions with patient   What is the patient's perception of their health status since discharge? Improving   Nursing interventions Nurse provided patient education   Is the patient/caregiver able to teach back signs and symptoms of when to call for help immediately: Sudden chest discomfort, Sudden discomfort in  arms, back, neck or jaw, Shortness of breath at any time, Irregular or rapid heart rate, Dizziness or lightheadedness, Nausea or vomiting, Sudden sweating or clammy skin   Nursing interventions Nurse provided patient education   Is the pateint /caregiver able to teach back the importance of cardiac rehab? Yes   Nursing interventions Provided education on importance of cardiac rehab   Is the patient/caregiver able to teach back lifestyle changes to help prevent MIs Heart healthy diet   Is the patient/caregiver able to teach back ways to prevent a second heart attack: Take medications, Follow up with MD   If the patient is a current smoker, are they able to teach back resources for cessation? Not a smoker   Is the patient/caregiver able to teach back the hierarchy of who to call/visit for symptoms/problems? PCP, Specialist, Home health nurse, Urgent Care, ED, 911 Yes   Week 1 call completed? Yes          JOSUE KIM - Registered Nurse

## 2022-08-17 NOTE — PAYOR COMM NOTE
"Ref # [id number]  Gabi West RN, BSN, CMGT-BC  Phone # [phone]  Fax # [phone]  Tila Cleveland (82 y.o. Female)             Date of Birth   1940    Social Security Number       Address   [address] [facility] [place]    Home Phone   [phone]    MRN   [id number]       [facility]    Marital Status                               Admission Date   8/10/22    Admission Type   Urgent    Admitting Provider   Diego Curtis MD    Attending Provider       Department, Room/Bed   [address], S[address]/1       Discharge Date   8/12/2022    Discharge Disposition   Home or Self Care    Discharge Destination                               Attending Provider: (none)   Allergies: Quinolones, Penicillins    Isolation: None   Infection: None   Code Status: Prior   Advance Care Planning Activity    Ht: 170.2 cm (67\")   Wt: 61.1 kg (134 lb 9.6 oz)    Admission Cmt: None   Principal Problem: None                Active Insurance as of 8/10/2022     Primary Coverage     Payor Plan Insurance Group Employer/Plan Group    AETNA MEDICARE REPLACEMENT AETNA MEDICARE REPLACEMENT [id number]     Payor Plan Address Payor Plan Phone Number Payor Plan Fax Number Effective Dates    [address] [phone]  1/1/2019 - None Entered    [facility] [id number]       Subscriber Name Subscriber Birth Date Member ID       Tila Cleveland 1940 [id number]                    Discharge Summary      Eddie Bashir MD at 08/12/22 0805          Cardiology Discharge Note    Patient Name:  Tila Cleveland  Date of Admission:  8/10/2022  Date of Discharge:  8/12/2022    Discharge Diagnosis: Status post inferior STEMI    Problem List:    STEMI (ST elevation myocardial infarction) (HCC)      Presenting Problem/History of Present Illness:  History of ST elevation myocardial infarction (STEMI) [I25.2]  STEMI (ST elevation myocardial infarction) (HCC) [I21.3]    Chest pain    Hospital Course:  Patient " is a 82 y.o. female presented with with chest pain on the day of admission to [facility].  There she was found in route to be having an acute inferior STEMI.  She was taken to the cardiac Cath Lab there and had a balloon angioplasty to open the artery but the patient had heavy calcification.  She was transferred here and underwent an atherectomy balloon angioplasty and stenting of the right coronary artery with excellent angiographic result.  The note from yesterday stated the patient was short of breath.  She states she feels well now.  She is not short of breath at all.  She would like to go home..        Physical Exam:  Temp:  [98 °F (36.7 °C)-98.4 °F (36.9 °C)] 98 °F (36.7 °C)  Heart Rate:  [72-94] 79  Resp:  [15-19] 18  BP: (101-150)/(56-84) 150/83    Neck: No JVP    Cardiovascular: Regular rate and rhythm        Respiratory: Clear bilaterally    Neurologic: Facial expressions are symmetrical      Procedures Performed: 1 cardiac cath with rotational atherectomy, stenting of the right coronary artery      Consults:   Consults     No orders found from 7/12/2022 to 8/11/2022.            Condition on Discharge: Improved      Discharge Disposition: Home    Discharge Medications:     Discharge Medications      New Medications      Instructions Start Date   aspirin 81 MG EC tablet   81 mg, Oral, Daily      rosuvastatin 10 MG tablet  Commonly known as: CRESTOR   10 mg, Oral, Nightly      ticagrelor 90 MG tablet tablet  Commonly known as: BRILINTA   90 mg, Oral, Every 12 Hours Scheduled         Changes to Medications      Instructions Start Date   metoprolol succinate XL 25 MG 24 hr tablet  Commonly known as: TOPROL-XL  What changed: how much to take   12.5 mg, Oral, Daily         Continue These Medications      Instructions Start Date   ALPRAZolam 1 MG tablet  Commonly known as: XANAX   0.5 mg, Oral, Nightly      amLODIPine 5 MG tablet  Commonly known as: NORVASC   5 mg, Oral, Nightly      dextromethorphan  polistirex ER 30 MG/5ML Suspension Extended Release oral suspension  Commonly known as: DELSYM   30 mg, Oral, Every 12 Hours PRN      dicyclomine 10 MG capsule  Commonly known as: Bentyl   10 mg, Oral, 4 Times Daily Before Meals & Nightly      HYDROcodone-acetaminophen  MG per tablet  Commonly known as: NORCO   1 tablet, Oral, Every 8 Hours PRN      isosorbide dinitrate 30 MG tablet  Commonly known as: ISORDIL   30 mg, Oral, 2 Times Daily      levothyroxine 75 MCG tablet  Commonly known as: SYNTHROID, LEVOTHROID   75 mcg, Oral, Daily             Discharge Diet: Healthy heart    Activity at Discharge: No restrictions    Follow-up Appointments: Follow-up with Dr. Avitia 4 weeks to be scheduled for catheter-based intervention of the LAD      Time: 20 minutes    Eddie Bashir MD,  8/12/2022 - 08:05 EDT      Electronically signed by Eddie Bashir MD at 08/12/22 0807       Discharge Order (From admission, onward)     Start     Ordered    08/12/22 0801  Discharge patient  Once        Expected Discharge Date: 08/12/22    Expected Discharge Time: Morning    Discharge Disposition: Home or Self Care    Physician of Record for Attribution - Please select from Treatment Team: DEE PARDO [[id number]]    Review needed by CMO to determine Physician of Record: No       Question Answer Comment   Physician of Record for Attribution - Please select from Treatment Team DEE PARDO    Review needed by CMO to determine Physician of Record No        08/12/22 0804

## 2022-08-19 ENCOUNTER — OFFICE VISIT (OUTPATIENT)
Dept: CARDIAC SURGERY | Facility: CLINIC | Age: 82
End: 2022-08-19

## 2022-08-19 DIAGNOSIS — R91.1 LEFT LOWER LOBE PULMONARY NODULE: Primary | ICD-10-CM

## 2022-08-19 PROCEDURE — 99442 PR PHYS/QHP TELEPHONE EVALUATION 11-20 MIN: CPT | Performed by: NURSE PRACTITIONER

## 2022-08-19 NOTE — PROGRESS NOTES
You have chosen to receive care through a telephone visit. Do you consent to use a telephone visit for your medical care today? Yes     Telehealth E-Visit      Patient Name: Tila Cleveland  : 1940   MRN: [id number]     The patient has consented to a Telehealth Visit.     Chief Complaint:    Chief Complaint   Patient presents with   • Follow-up     6 MO FU with CT Chest-Left Lung Nodule-Recent Hospitalization and Heart Cath       History of Present Illness:   Tila Cleveland is a 82 y.o. female former smoker, with a history of hypertension, CAD s/p stenting and lung nodules followed by Dr. Sainz. Patient was initially seen in clinic 2021 for evaluation of left lower lobe lung nodule (previously 9 hypermetabolic on PET imaging performed 2019).  At that time, Dr. Sainz recommended proceeding with CT-guided needle biopsy, but patient declined and wished to pursue surveillance imaging.  Patient was last seen in clinic 2021 with stable 1.2/1.3 cm parenchymal left lower lung nodule and again patient declined CT-guided needle biopsy.  Recommendations were made for continued 6-month surveillance imaging.  Patient presents today for follow-up.  She has had 2 recent hospitalizations for chest pain where she underwent POBA to RCA in [place], transfer to [place] where patient received two successful stents to RCA via arterectomy. Patient denies any further chest pain but is still having some SOA. Plans to follow with cardiology Dr. Curtis in the coming weeks. Patient denies any unintentional weight loss, hemoptysis, fevers, chills, or body aches.       Subjective      Review of Systems:   Review of Systems   Constitutional: Negative for chills, fever, malaise/fatigue, night sweats and weight loss.   HENT: Positive for hoarse voice. Negative for hearing loss, odynophagia and sore throat.    Cardiovascular: Positive for leg swelling (right leg). Negative for chest pain, dyspnea on exertion,  orthopnea and palpitations.   Respiratory: Positive for cough, shortness of breath and wheezing. Negative for hemoptysis.    Endocrine: Negative for cold intolerance, heat intolerance, polydipsia, polyphagia and polyuria.   Hematologic/Lymphatic: Bruises/bleeds easily.   Skin: Negative for itching and rash.   Musculoskeletal: Positive for back pain. Negative for joint pain, joint swelling and myalgias.   Gastrointestinal: Negative for abdominal pain, constipation, diarrhea, hematemesis, hematochezia, melena, nausea and vomiting.   Genitourinary: Negative for dysuria, frequency and hematuria.   Neurological: Negative for focal weakness, headaches, numbness and seizures.   Psychiatric/Behavioral: Negative for suicidal ideas.   All other systems reviewed and are negative.      I have reviewed the following portions of the patient's history: allergies, current medications, past family history, past medical history, past social history, past surgical history, problem list and ROS and confirm it's accurate.    Medications:     Current Outpatient Medications:   •  ALPRAZolam (XANAX) 1 MG tablet, Take 0.5 mg by mouth Every Night., Disp: , Rfl:   •  amLODIPine (NORVASC) 5 MG tablet, Take 5 mg by mouth Every Night., Disp: , Rfl:   •  aspirin 81 MG EC tablet, Take 1 tablet by mouth Daily., Disp: 90 tablet, Rfl: 3  •  dextromethorphan polistirex ER (DELSYM) 30 MG/5ML Suspension Extended Release oral suspension, Take 30 mg by mouth Every 12 (Twelve) Hours As Needed (cough)., Disp: , Rfl:   •  dicyclomine (Bentyl) 10 MG capsule, Take 1 capsule by mouth 4 (Four) Times a Day Before Meals & at Bedtime., Disp: 120 capsule, Rfl: 0  •  HYDROcodone-acetaminophen (NORCO)  MG per tablet, Take 1 tablet by mouth Every 8 (Eight) Hours As Needed for Moderate Pain ., Disp: , Rfl:   •  isosorbide dinitrate (ISORDIL) 30 MG tablet, Take 30 mg by mouth 2 (Two) Times a Day., Disp: , Rfl:   •  levothyroxine (SYNTHROID, LEVOTHROID) 75 MCG  tablet, Take 1 tablet by mouth Daily., Disp: 30 tablet, Rfl: 0  •  metoprolol succinate XL (TOPROL-XL) 25 MG 24 hr tablet, Take 0.5 tablets by mouth Daily., Disp: 50 tablet, Rfl: 3  •  rosuvastatin (CRESTOR) 10 MG tablet, Take 1 tablet by mouth Every Night., Disp: 90 tablet, Rfl: 3  •  ticagrelor (BRILINTA) 90 MG tablet tablet, Take 1 tablet by mouth Every 12 (Twelve) Hours for 180 days., Disp: 60 tablet, Rfl: 3    Allergies:   Allergies   Allergen Reactions   • Quinolones Other (See Comments)     Can not recall reaction but she had to go to the hospital.   • Penicillins Rash       Objective     Physical Exam: [facility] via telehealth   Vital Signs: There were no vitals filed for this visit.  There is no height or weight on file to calculate BMI.    Physical Exam  Constitutional:       General: She is awake.   Neurological:      Mental Status: She is alert.   Psychiatric:         Behavior: Behavior is cooperative.         Imaging/Labs:    CT Chest w/wo contrast 4/1/22:   IMPRESSION:  1.  Stable chest CT demonstrating no change in a partially calcified  nodule left lower lobe that is again 13 mm in diameter.  2.  Other nonacute/incidental findings as above appear stable from  Previous.    CT Chest without contrast   IMPRESSION:  1.  1.3 cm parenchymal nodule in the left lung is stable.  2.  No new parenchymal nodules or masses.  3.  Coronary artery calcifications.     CT Chest without contrast 3/25/21:   CT FINDINGS: The lungs continue to be generally hyperinflated. There are  numerous granulomas in the lungs. There continues to be a solid lung  nodule in the left lower lobe. The lung nodule on today's study measures  12.8 mm, slightly larger than on the earlier exam. There are granulomas  and other small nodules in the lungs as well. There were no inflammatory  infiltrates or pleural effusions. No enlarged lymph nodes are identified  in the mediastinum or hilar areas. The heart was not enlarged, there  were no pericardial  effusions. There is a small hiatal hernia.  IMPRESSION:  Slight interval increase in the lung nodule in the left  lower lobe by approximately 0.8 mm. There continue to be changes of  interstitial lung disease throughout both lungs with hyperinflation. A  small hiatal hernia in the stomach was noted in the lower mediastinum.  Assessment / Plan      Assessment/Plan:  Diagnoses and all orders for this visit:    1. Left lower lobe pulmonary nodule (Primary)       1.  Left lower lobe lung nodule followed by Dr. Sainz: She has had 2 recent hospitalizations for chest pain where she underwent POBA to RCA in [place], transfer to [place] where patient received two successful stents to RCA via arterectomy. Patient denies any further chest pain but is still having some SOA. Plans to follow with cardiology Dr. Curtis in the coming weeks. Patient denies any unintentional weight loss, hemoptysis, fevers, chills, or body aches.  Discussed results of April 2022 CT chest with patient.  Personally reviewed, calcified nodularities/opacifications in RUL stable compared to previous imaging.  Patient with stable left lower lobe partially calcified nodule favoring granuloma or hamartoma about 1.3 cm.  Due to patient having to every 6 month stable imaging over the last year, will plan to continue surveillance with CT chest in 1 year.  Instructed patient to continue following closely with cardiology.    Follow Up:   Return in about 1 year (around 8/19/2023) for F/u with imaging.  Or sooner for any further concerns or worsening sign and symptoms. If unable to reach us in the office please dial 911 or go to the nearest emergency department.    This visit has been rescheduled as a phone visit to comply with patient safety concerns in accordance with CDC recommendations. Total time of discussion was 17 minutes.     YAMILET Alcantara  [facility] Cardiothoracic Surgery

## 2022-08-24 ENCOUNTER — READMISSION MANAGEMENT (OUTPATIENT)
Dept: CALL CENTER | Facility: HOSPITAL | Age: 82
End: 2022-08-24

## 2022-08-24 NOTE — OUTREACH NOTE
AMI Week 2 Survey    Flowsheet Row Responses   [facility] patient discharged from? [facility]   Does the patient have one of the following disease processes/diagnoses(primary or secondary)? Acute MI (STEMI,NSTEMI)   Week 2 attempt successful? No   Unsuccessful attempts Attempt 1          CURTIS EDDY - Registered Nurse

## 2022-09-06 ENCOUNTER — READMISSION MANAGEMENT (OUTPATIENT)
Dept: CALL CENTER | Facility: HOSPITAL | Age: 82
End: 2022-09-06

## 2022-09-06 NOTE — OUTREACH NOTE
AMI Week 4 Survey    Flowsheet Row Responses   [facility] patient discharged from? [facility]   Does the patient have one of the following disease processes/diagnoses(primary or secondary)? Acute MI (STEMI,NSTEMI)   Week 4 attempt successful? Yes   Call start time 1714   Call end time 1717   Discharge diagnosis  STEMI   Meds reviewed with patient/caregiver? Yes   Is the patient having any side effects they believe may be caused by any medication additions or changes? No   Is the patient taking all medications as directed (includes completed medication regime)? Yes   Is the patient still receiving Home Health Services? Yes   Psychosocial issues? No   What is the patient's perception of their health status since discharge? Improving   Nursing interventions Nurse provided patient education   Is the patient/caregiver able to teach back signs and symptoms of when to call for help immediately: Sudden chest discomfort, Sudden discomfort in arms, back, neck or jaw, Shortness of breath at any time, Sudden sweating or clammy skin, Nausea or vomiting, Dizziness or lightheadedness, Irregular or rapid heart rate   Nursing interventions Nurse provided patient education   Is the pateint /caregiver able to teach back the importance of cardiac rehab? Yes   Is the patient/caregiver able to teach back lifestyle changes to help prevent MIs Heart healthy diet, Regular exercise as approved by provider, Maintaining a healthy weight, Reducing stress   Is the patient/caregiver able to teach back ways to prevent a second heart attack: Take medications, Follow up with MD, Participate in Cardiac Rehab, Manage risk factors   Is the patient/caregiver able to teach back the hierarchy of who to call/visit for symptoms/problems? PCP, Specialist, Home health nurse, Urgent Care, ED, 911 Yes   Additional teach back comments states tries to stay active, states  PT is helpful, states appetite is good   Week 4 call completed? Yes   Would the patient  like one additional call? No   Graduated Yes   Is the patient interested in additional calls from an ambulatory ?  NOTE:  applies to high risk patients requiring additional follow-up. No   Did the patient feel the follow up calls were helpful during their recovery period? Yes   Was the number of calls appropriate? Yes          KEREN EDDY - Registered Nurse

## 2022-09-09 ENCOUNTER — DOCUMENTATION (OUTPATIENT)
Dept: CARDIAC REHAB | Facility: HOSPITAL | Age: 82
End: 2022-09-09

## 2022-09-09 NOTE — PROGRESS NOTES
Thank you for your referral to Cardiac Rehab.  Family will call back when they figure out when they can bring her.

## 2022-11-21 RX ORDER — TICAGRELOR 90 MG/1
TABLET ORAL
Qty: 60 TABLET | Refills: 3 | OUTPATIENT
Start: 2022-11-21

## 2023-01-11 ENCOUNTER — OFFICE VISIT (OUTPATIENT)
Dept: CARDIOLOGY | Facility: CLINIC | Age: 83
End: 2023-01-11
Payer: MEDICARE

## 2023-01-11 VITALS
BODY MASS INDEX: 20.53 KG/M2 | DIASTOLIC BLOOD PRESSURE: 68 MMHG | OXYGEN SATURATION: 96 % | HEART RATE: 76 BPM | SYSTOLIC BLOOD PRESSURE: 118 MMHG | WEIGHT: 130.8 LBS | HEIGHT: 67 IN

## 2023-01-11 DIAGNOSIS — I10 ESSENTIAL HYPERTENSION: Chronic | ICD-10-CM

## 2023-01-11 DIAGNOSIS — I25.10 ASCVD (ARTERIOSCLEROTIC CARDIOVASCULAR DISEASE): Primary | Chronic | ICD-10-CM

## 2023-01-11 DIAGNOSIS — E78.5 DYSLIPIDEMIA: Chronic | ICD-10-CM

## 2023-01-11 PROCEDURE — 99214 OFFICE O/P EST MOD 30 MIN: CPT | Performed by: NURSE PRACTITIONER

## 2023-01-11 PROCEDURE — 93000 ELECTROCARDIOGRAM COMPLETE: CPT | Performed by: NURSE PRACTITIONER

## 2023-01-11 RX ORDER — ISOSORBIDE DINITRATE 30 MG/1
30 TABLET ORAL 3 TIMES DAILY
Qty: 270 TABLET | Refills: 3 | Status: SHIPPED | OUTPATIENT
Start: 2023-01-11

## 2023-01-11 NOTE — PROGRESS NOTES
"Chief Complaint  Follow-up (HOSPITAL F/U CATH, right CP and left shoulder pain, mild SOA on exertion, leg/ankle swelling, fatigue) and MEDICATION REVIEW (PT REVIEW)    Subjective          Tila Cleveland presents to [facility] for follow up.    History of Present Illness    On 8/9/2022 patient presented with an acute inferior STEMI.  Cardiac catheterization revealed diffuse coronary artery disease.  RCA was culprit lesion.  Dr. Jimenes proceeded with a balloon angioplasty to the calcified RCA lesion and sent patient to [place] for further intervention.  On 8/10/2022 patient underwent a rotational atherectomy, Cutting Balloon angioplasty, intravascular ultrasound and stenting of the RCA.  This procedure was completed by Dr. Curtis.    Patient is here today with her first follow-up.    Patient reports some chest pain.  She reports that she has pain on the right side and then left shoulder and arm pain.  She reports dyspnea on exertion and some lower extremity swelling.  Blood pressures at home have been variable.  According to the patient she has had blood pressures as high as 180s systolically    Objective     Vital Signs:   /68   Pulse 76   Ht 170.2 cm (67\")   Wt 59.3 kg (130 lb 12.8 oz)   SpO2 96%   BMI 20.49 kg/m²       Physical Exam  Vitals reviewed.   Constitutional:       Appearance: Normal appearance. She is well-developed.   Cardiovascular:      Rate and Rhythm: Normal rate and regular rhythm.      Heart sounds: No murmur heard.    No friction rub. No gallop.   Pulmonary:      Effort: Pulmonary effort is normal. No respiratory distress.      Breath sounds: Normal breath sounds. No wheezing or rales.   Skin:     General: Skin is warm and dry.   Neurological:      Mental Status: She is alert and oriented to person, place, and time.   Psychiatric:         Mood and Affect: Mood normal.         Behavior: Behavior normal.          Result Review :            ECG 12 " Lead    Date/Time: 1/11/2023 3:04 PM  Performed by: Ely Cox APRN  Authorized by: Ely Cox APRN   Comparison: compared with previous ECG from 8/10/2022  Comparison to previous ECG: Normal sinus rhythm, ST and T wave abnormalities in the inferior and anterior lateral leads, prolonged   Rhythm: sinus rhythm  BPM: 68  T inversion: III and aVF  T flattening: V6 and II               Current Outpatient Medications   Medication Sig Dispense Refill   • ALPRAZolam (XANAX) 1 MG tablet Take 0.5 mg by mouth Every Night. 0.5  IN THE MORNING AND 0.5 AT NIGHT     • amLODIPine (NORVASC) 5 MG tablet Take 5 mg by mouth Every Night.     • aspirin 81 MG EC tablet Take 1 tablet by mouth Daily. 90 tablet 3   • dicyclomine (Bentyl) 10 MG capsule Take 1 capsule by mouth 4 (Four) Times a Day Before Meals & at Bedtime. 120 capsule 0   • HYDROcodone-acetaminophen (NORCO)  MG per tablet Take 1 tablet by mouth Every 8 (Eight) Hours As Needed for Moderate Pain .     • isosorbide dinitrate (ISORDIL) 30 MG tablet Take 1 tablet by mouth 3 (Three) Times a Day. 270 tablet 3   • levothyroxine (SYNTHROID, LEVOTHROID) 75 MCG tablet Take 1 tablet by mouth Daily. 30 tablet 0   • metoprolol succinate XL (TOPROL-XL) 25 MG 24 hr tablet Take 0.5 tablets by mouth Daily. 50 tablet 3   • rosuvastatin (CRESTOR) 10 MG tablet Take 1 tablet by mouth Every Night. 90 tablet 3   • ticagrelor (BRILINTA) 90 MG tablet tablet Take 1 tablet by mouth Every 12 (Twelve) Hours for 180 days. 60 tablet 3   • dextromethorphan polistirex ER (DELSYM) 30 MG/5ML Suspension Extended Release oral suspension Take 30 mg by mouth Every 12 (Twelve) Hours As Needed (cough).       No current facility-administered medications for this visit.            Assessment and Plan    Problem List Items Addressed This Visit        Cardiac and Vasculature    ASCVD (arteriosclerotic cardiovascular disease) - Primary (Chronic)    Overview     · 8/9/2022 C for STEMI: POBA to  the RCA, LAD is diffusely calcified, diagonal with 70 to 80% stenosis, bifurcation lesion involving the ostium of the diagonal 1 also had 2 tandem 70 to 80% stenosis in the proximal portion, LCx with mild luminal irregularities, OM had a proximal long tubular 40 to 50% stenosis  · 8/10/2022 LHC: Rotational atherectomy, Cutting Balloon angioplasty, intravascular ultrasound and stenting of the RCA  · 8/11/2022 TTE: LVEF 56 to 60%, hypokinesia of the basal inferior lateral and basal inferior wall, grade 1a diastolic dysfunction with high LAP, aortic valve sclerosis and trace MR         Essential hypertension (Chronic)    Dyslipidemia (Chronic)    Overview     · Total cholesterol 139, triglycerides 200, HDL 41, and LDL 65                Follow Up     Medications were reviewed with the patient.    Isordil added for anginal symptoms.  Patient instructed to take twice daily and use third dose as needed for elevated blood pressure or anginal pain.  Continue GDMT for ASCVD including aspirin, Brilinta, rosuvastatin, metoprolol succinate.    Continue amlodipine for pretension.    With regard to dyslipidemia, continue rosuvastatin.    Return in about 6 months (around 7/11/2023).    Patient was given instructions and counseling regarding her condition or for health maintenance advice. Please see specific information pulled into the AVS if appropriate.

## 2023-01-13 PROBLEM — I10 ESSENTIAL HYPERTENSION: Chronic | Status: ACTIVE | Noted: 2023-01-13

## 2023-01-13 PROBLEM — I10 ESSENTIAL HYPERTENSION: Status: ACTIVE | Noted: 2023-01-13

## 2023-01-13 PROBLEM — E78.5 DYSLIPIDEMIA: Status: ACTIVE | Noted: 2023-01-13

## 2023-01-13 PROBLEM — I25.10 ASCVD (ARTERIOSCLEROTIC CARDIOVASCULAR DISEASE): Status: ACTIVE | Noted: 2023-01-13

## 2023-01-13 PROBLEM — I25.10 ASCVD (ARTERIOSCLEROTIC CARDIOVASCULAR DISEASE): Chronic | Status: ACTIVE | Noted: 2023-01-13

## 2023-01-13 PROBLEM — E78.5 DYSLIPIDEMIA: Chronic | Status: ACTIVE | Noted: 2023-01-13

## 2023-07-21 ENCOUNTER — OFFICE VISIT (OUTPATIENT)
Dept: CARDIOLOGY | Facility: CLINIC | Age: 83
End: 2023-07-21
Payer: MEDICARE

## 2023-07-21 VITALS
BODY MASS INDEX: 20.5 KG/M2 | OXYGEN SATURATION: 97 % | WEIGHT: 130.6 LBS | DIASTOLIC BLOOD PRESSURE: 75 MMHG | SYSTOLIC BLOOD PRESSURE: 132 MMHG | HEART RATE: 81 BPM | HEIGHT: 67 IN

## 2023-07-21 DIAGNOSIS — I10 ESSENTIAL HYPERTENSION: Chronic | ICD-10-CM

## 2023-07-21 DIAGNOSIS — E78.5 DYSLIPIDEMIA: Chronic | ICD-10-CM

## 2023-07-21 DIAGNOSIS — I25.10 ASCVD (ARTERIOSCLEROTIC CARDIOVASCULAR DISEASE): Primary | Chronic | ICD-10-CM

## 2023-07-21 PROCEDURE — 99214 OFFICE O/P EST MOD 30 MIN: CPT | Performed by: INTERNAL MEDICINE

## 2023-07-21 PROCEDURE — 3075F SYST BP GE 130 - 139MM HG: CPT | Performed by: INTERNAL MEDICINE

## 2023-07-21 PROCEDURE — 3078F DIAST BP <80 MM HG: CPT | Performed by: INTERNAL MEDICINE

## 2023-07-21 RX ORDER — TICAGRELOR 90 MG/1
1 TABLET ORAL 2 TIMES DAILY
COMMUNITY
Start: 2023-06-09 | End: 2023-07-21

## 2023-07-21 RX ORDER — RANOLAZINE 500 MG/1
500 TABLET, EXTENDED RELEASE ORAL 2 TIMES DAILY
Qty: 90 TABLET | Refills: 3 | Status: SHIPPED | OUTPATIENT
Start: 2023-07-21

## 2023-07-21 RX ORDER — HYDROCHLOROTHIAZIDE 12.5 MG/1
12.5 CAPSULE, GELATIN COATED ORAL DAILY
Qty: 30 CAPSULE | Refills: 11 | Status: SHIPPED | OUTPATIENT
Start: 2023-07-21

## 2023-07-21 RX ORDER — TRIAMCINOLONE ACETONIDE 1 MG/G
CREAM TOPICAL EVERY 12 HOURS
COMMUNITY
Start: 2023-06-09

## 2023-07-21 RX ORDER — METOPROLOL SUCCINATE 25 MG/1
25 TABLET, EXTENDED RELEASE ORAL DAILY
Qty: 50 TABLET | Refills: 3 | Status: SHIPPED | OUTPATIENT
Start: 2023-07-21

## 2023-07-21 NOTE — PROGRESS NOTES
[facility] CARDIOLOGY  [address] [facility] [place] [address]  Phone: [phone]  Fax: [phone]    2023    Chief Complaint   Patient presents with    Follow-up     Intermittent CP, SOA, leg/ankle swelling  Right leg sores and swelling     Med Review     Tolerating all medications well.        History:     Tila Cleveland is a 83 y.o. female presenting for  follow-up evaluation   Clinically stable from cardiac standpoint   Symptoms:  CP typical  SOB stable    Orthopnea No  Lower extremity edema stable    Palpitations no   Compliant with medications yes  Claudication no      Past Medical History:   Diagnosis Date    Coronary artery disease     Disease of thyroid gland     Hypertension     Hypothyroidism     Lung nodule        Past Surgical History:   Procedure Laterality Date    CARDIAC CATHETERIZATION N/A 2022    Procedure: Left Heart Cath;  Surgeon: Allen Jimenes MD;  Location:  [facility];  Service: Cardiovascular;  Laterality: N/A;    CARDIAC CATHETERIZATION N/A 2022    Procedure: Percutaneous Coronary Intervention;  Surgeon: Allen Jimenes MD;  Location:  [facility];  Service: Cardiovascular;  Laterality: N/A;    CARDIAC CATHETERIZATION N/A 08/10/2022    Procedure: PERCUTANEOUS CORONARY INTERVENTION of RCA with possible rota;  Surgeon: Diego Curtis MD;  Location:  [facility];  Service: Cardiology;  Laterality: N/A;    CORONARY STENT PLACEMENT      THYROIDECTOMY, PARTIAL          Past Social History:  Social History     Socioeconomic History    Marital status:     Number of children: 3   Tobacco Use    Smoking status: Former     Packs/day: 1.00     Years: 20.00     Pack years: 20.00     Types: Cigarettes     Quit date:      Years since quittin.5    Smokeless tobacco: Current     Types: Snuff   Vaping Use    Vaping Use: Never used   Substance and Sexual Activity     Monitor for worrisome signs such as redness, warmth, pus drainage, or swelling in the area. Keep the area clean and dry, gently soaking the area in plain warm water twice daily for the next 3-5 days.   Foreign Object Under the Skin (Removed)  An object has been removed from under your skin. Although care was taken to remove all of it, there is always a chance that a small piece may have been left behind.  Most skin wounds heal without problems. But there can be an increased risk of infection if anything stays under the skin. Sometimes the pieces work their way out on their own, and sometimes they can cause an infection. Very small pieces that stay under the skin usually don t cause a problem or need further treatment.  Home care  Wound care    Keep the wound clean and dry.    If there is a dressing or bandage, change it when it gets wet or dirty. Otherwise, leave it on for the first 24 hours, then change it once a day or as often as you were instructed.    If stitches or staples were used, clean the wound every day:  ? After taking off the dressing, wash the area gently with soap and water.  ? Apply a thin layer of antibiotic ointment to the cut. This will keep the wound clean and make it easier to remove the stitches. If it is oozing a lot, you can put a nonstick dressing over it. Then reapply the bandage or dressing as you were instructed.  ? You can get it wet, just like when you clean it. This means you can shower as usual for the first 24 hours, but do not soak the area in water (no baths or swimming) until the stitches or staples are taken out.    If surgical tape or strips were used, keep the area clean and dry. If it becomes wet, blot it dry with a towel.    Medicine    You can take over-the-counter medicine for pain, unless you were given a different pain medicine to use. If you have chronic liver or kidney disease or ever had a stomach ulcer, or gastrointestinal bleeding or are taking blood thinner  Alcohol use: Yes     Comment: OCC.    Drug use: No    Sexual activity: Defer       Past Family History:  Family History   Problem Relation Age of Onset    Diabetes Mother     Stroke Mother     Lung cancer Father        Review of Systems:   ROS       Current Outpatient Medications   Medication Sig Dispense Refill    ALPRAZolam (XANAX) 1 MG tablet Take 0.5 mg by mouth Every Night. 0.5  IN THE MORNING AND 0.5 AT NIGHT      amLODIPine (NORVASC) 5 MG tablet Take 1 tablet by mouth Every Night.      aspirin 81 MG EC tablet Take 1 tablet by mouth Daily. 90 tablet 3    dicyclomine (Bentyl) 10 MG capsule Take 1 capsule by mouth 4 (Four) Times a Day Before Meals & at Bedtime. 120 capsule 0    HYDROcodone-acetaminophen (NORCO)  MG per tablet Take 1 tablet by mouth Every 8 (Eight) Hours As Needed for Moderate Pain.      isosorbide dinitrate (ISORDIL) 30 MG tablet Take 1 tablet by mouth 3 (Three) Times a Day. (Patient taking differently: Take 1 tablet by mouth 3 (Three) Times a Day. 2times daily) 270 tablet 3    levothyroxine (SYNTHROID, LEVOTHROID) 75 MCG tablet Take 1 tablet by mouth Daily. 30 tablet 0    metoprolol succinate XL (TOPROL-XL) 25 MG 24 hr tablet Take 1 tablet by mouth Daily. 50 tablet 3    rosuvastatin (CRESTOR) 10 MG tablet Take 1 tablet by mouth Every Night. 90 tablet 3    triamcinolone (KENALOG) 0.1 % cream Apply  topically to the appropriate area as directed Every 12 (Twelve) Hours.      dextromethorphan polistirex ER (DELSYM) 30 MG/5ML Suspension Extended Release oral suspension Take 30 mg by mouth Every 12 (Twelve) Hours As Needed (cough). (Patient not taking: Reported on 7/21/2023)      hydroCHLOROthiazide (MICROZIDE) 12.5 MG capsule Take 1 capsule by mouth Daily. 30 capsule 11    ranolazine (Ranexa) 500 MG 12 hr tablet Take 1 tablet by mouth 2 (Two) Times a Day. 90 tablet 3    ticagrelor (Brilinta) 60 MG tablet tablet Take 1 tablet by mouth 2 (Two) Times a Day. 60 tablet 11     No current  medicines, talk with your healthcare provider before using these medicines.    If you were given antibiotics, take them until they are used up. It is important to finish the antibiotics even if the wound looks better to make sure the infection clears.  Follow-up care  Follow up your healthcare provider, or as advised. Keep in mind the following:    Watch for any signs of infection, such as increasing pain, redness, swelling, or pus drainage. If this happens, don t wait for your scheduled visit, rather see your healthcare provider sooner.    Stitches or staples are usually taken out within 5 to 14 days. This varies depending on what part of your body they are on, and the type of wound. The healthcare provider will tell you how long they should be left in.    If surgical tape or strips were used, they are usually left on for 7 to 10 days. You can remove them after that unless you were told otherwise. If you try to remove them, and it is too difficult, soaking can help. If the edges of the cut pull apart, then stop removing the tape, and follow up with your healthcare provider.    If X-rays were taken, you will be told if there are new findings that may affect your care.  When to seek medical care  Call your healthcare provider right away if any of these occur:    Fever of 100.4 F (38 C) or higher, or as directed by your healthcare provider    Increasing pain in the wound    Redness, swelling or pus coming from the wound  Date Last Reviewed: 10/1/2016    6735-0901 [employer]. [address] [place] [address], [place] [address]. All rights reserved. This information is not intended as a substitute for professional medical care. Always follow your healthcare professional's instructions.         "facility-administered medications for this visit.        Allergies   Allergen Reactions    Quinolones Other (See Comments)     Can not recall reaction but she had to go to the hospital.    Penicillins Rash       Objective     /75   Pulse 81   Ht 170.2 cm (67\")   Wt 59.2 kg (130 lb 9.6 oz)   SpO2 97%   BMI 20.45 kg/m²     Physical Exam       DATA:  Results for orders placed during the hospital encounter of 09/21/19    SCANNED - TELEMETRY     Results for orders placed during the hospital encounter of 08/10/22    Adult Transthoracic Echo Complete W/ Cont if Necessary Per Protocol    Interpretation Summary  · Left ventricular ejection fraction appears to be 56 - 60%. Left ventricular systolic function is normal.  · The following left ventricular wall segments are hypokinetic: basal inferolateral and basal inferior.  · Left ventricular diastolic function is consistent with (grade Ia w/high LAP) impaired relaxation.     Results for orders placed during the hospital encounter of 08/10/22    Cardiac Catheterization/Vascular Study    Narrative  Table formatting from the original result was not included.  Images from the original result were not included.  [facility] Cardiology  [address] [place] [address], [address]  [place], [place], [id number] [phone]  WWW.[facility] ServicesUXCam[facility]          CARDIAC CATHETERIZATION PROCEDURE NOTE    Impression  · The 90% heavily calcified proximal RCA stenosis is status post rotational atherectomy, cutting balloon angioplasty, and stenting with a Synergy 3.5 x 28 mm drug-eluting stent.  Proximal optimization up to 4.5 mm.  · The 70% calcified mid RCA stenosis is status post cutting balloon angioplasty and stenting with a Synergy 3.0 x 12 mm drug-eluting stent and postdilatation to 3.25 mm    RECOMMENDATIONS:  · Clinical monitoring and telemetry  · Echocardiogram, EKG, labs as ordered  · DAPT, statin, and additional CAD medications as needed/tolerated  · Lifestyle and risk " factor modifications for CAD as able    ----------------------------------------------------------------------------------------------------------------------    Indication(s) for this Procedure:  Acute chest pain, found to have inferior STEMI upon presentation at [facility] yesterday, underwent emergent heart cath with balloon angioplasty only restoring CROW-3 flow to the RCA.  Further intervention was not performed due to a non-dilatable heavily calcified proximal segment.  Patient transferred to [facility] for staged intervention with atherectomy.    Procedure(s) Performed:  1. Right femoral artery access  2. Right femoral venous  3. Temporary pacemaker wire placement in the right ventricle  4. Selective coronary angiography  5. Rotational atherectomy, cutting balloon angioplasty, intravascular ultrasound, and stenting of the RCA    Description of the Procedure:  Informed consent was obtained with the goals, rationale, alternatives, risks and benefits of the procedure explained to the patient.    A 7Fr [place] sheath was placed in the right femoral artery with ultrasound guidance.  A 5 Nepalese [place] sheath was placed in the right femoral vein with ultrasound guidance.  A temporary pacemaker wire was placed in the right ventricle in the standard fashion.  Heparin was given for anticoagulation.  A 7 Nepalese AL 0.75 guide catheter was inserted.  A Rota drive guidewire within a Mamba microcatheter was used to cross the proximal mid segment stenoses.  The microcatheter was removed.  Atherectomy was performed with a 1.5 mm Rota Pro kerri with multiple passes.  A Jovan Blue guidewire was used to cross the lesions.  A 3.0 mm cutting balloon was used to cross the proximal segment lesions and inflated multiple times.  Intravascular ultrasound was performed.  A Synergy 3.5 x 28 mm drug-eluting stent was inserted in the proximal segment.  The stent was postdilated with a 4.0 mm noncompliant balloon.  A  guide liner was inserted.  The mid segment stenosis was predilated with a 3.0 mm cutting balloon.  A Synergy 3.0 x 12 mm drug-eluting stent was placed.  The stent was postdilated with a 3.25 mm noncompliant balloon.  Repeat intravascular ultrasound revealed an underexpanded proximal segment of the proximal segment stent.  The stent was postdilated further with a 4.5 mm noncompliant balloon.    The procedure was completed and the arterial sheath was removed.  An AngioSeal VIP was placed.  The venous sheath was sutured in place.    Angiographic Findings:  Please see the complete diagnostic angiogram report from August 9, 2022, from [facility], for complete angiographic details.  Below represents today's findings prior to intervention.    Right dominant coronary circulation  · Right coronary artery: The vessel is large sized and bifurcates into an RPL S and RPDA.  There is a heavily calcified, 90% proximal segment stenosis with what appears to be some residual thrombus.  There is a 70% calcified mid segment stenosis.  The RPL S is medium sized and without significant disease.  The RPDA is medium sized and without significant disease.    Intravascular imaging:   Intravascular ultrasound was performed to size a vessel for stenting.  Repeat intravascular ultrasound after initial intervention revealed an underexpanded proximal segment of the proximally placed stent.  This segment was further postdilated    Hemodynamic Findings:  · Ao pressure:  145/85 mmHg    Post-interventional Results:  Lesion #1  · ACC AHA class lesion: Type C  · Location:    Proximal and mid RCA  · Stenosis pre-PCI:  90% heavily calcified proximal segment stenosis, 70% calcified mid segment stenosis %  · Stenosis post-PCI:  0%  · CROW flow pre-PCI:  2.5  · CROW flow post-PCI:  3    Estimated Blood Loss:  Minimal    Specimen(s):  None obtained    Sheath:  Arterial sheath removed, AngioSeal VIP.  Venous sheath sutured in  place.    Complications:  There were no apparent early complications.    Diego Curtis MD, MSc, FACC, [facility]  Interventional Cardiology  [facility]    Procedures     Lab Results   Component Value Date    CHOL 139 08/10/2022     Lab Results   Component Value Date    TRIG 200 (H) 08/10/2022     Lab Results   Component Value Date    HDL 41 08/10/2022     Lab Results   Component Value Date    LDL 65 08/10/2022       Lab Results   Component Value Date    TSH 33.180 (H) 04/21/2022               Invalid input(s): LABALBU, PROT        Assessment and Plan    Assessment and Plan    Problem List Items Addressed This Visit          Cardiac and Vasculature    ASCVD (arteriosclerotic cardiovascular disease) - Primary (Chronic)    Overview     8/9/2022 [facility] for STEMI: POBA to the RCA, LAD is diffusely calcified, diagonal with 70 to 80% stenosis, bifurcation lesion involving the ostium of the diagonal 1 also had 2 tandem 70 to 80% stenosis in the proximal portion, LCx with mild luminal irregularities, OM had a proximal long tubular 40 to 50% stenosis  8/10/2022 [facility]: Rotational atherectomy, Cutting Balloon angioplasty, intravascular ultrasound and stenting of the RCA  8/11/2022 [facility]: LVEF 56 to 60%, hypokinesia of the basal inferior lateral and basal inferior wall, grade 1a diastolic dysfunction with high LAP, aortic valve sclerosis and trace MR         Relevant Orders    Compression Stockings    Essential hypertension (Chronic)    Relevant Medications    metoprolol succinate XL (TOPROL-XL) 25 MG 24 hr tablet    hydroCHLOROthiazide (MICROZIDE) 12.5 MG capsule    Dyslipidemia (Chronic)    Overview     Total cholesterol 139, triglycerides 200, HDL 41, and LDL 65                Recommended increase activity to 30 minutes of walking daily, most days of the week    Thank you for allowing me to participate in the care of Tila Cleveland. Feel free to contact me directly with any further questions or  concerns.          Allen Jimenes MD, FACC  Interventional Cardiology

## 2023-07-26 DIAGNOSIS — R91.1 LEFT LOWER LOBE PULMONARY NODULE: Primary | ICD-10-CM

## 2023-09-15 ENCOUNTER — HOSPITAL ENCOUNTER (OUTPATIENT)
Dept: CT IMAGING | Facility: HOSPITAL | Age: 83
Discharge: HOME | End: 2023-09-15
Admitting: NURSE PRACTITIONER
Payer: MEDICARE

## 2023-09-15 DIAGNOSIS — R91.1 LEFT LOWER LOBE PULMONARY NODULE: ICD-10-CM

## 2023-09-15 LAB — CREAT BLDA-MCNC: 0.9 MG/DL (ref 0.6–1.3)

## 2023-09-15 PROCEDURE — 82565 ASSAY OF CREATININE: CPT

## 2023-09-15 PROCEDURE — 71270 CT THORAX DX C-/C+: CPT

## 2023-09-15 PROCEDURE — 25510000001 IOPAMIDOL 61 % SOLUTION: Performed by: NURSE PRACTITIONER

## 2023-09-15 RX ADMIN — IOPAMIDOL 60 ML: 612 INJECTION, SOLUTION INTRAVENOUS at 13:43

## 2023-10-16 ENCOUNTER — OFFICE VISIT (OUTPATIENT)
Dept: CARDIOLOGY | Facility: CLINIC | Age: 83
End: 2023-10-16
Payer: MEDICARE

## 2023-10-16 VITALS
HEIGHT: 67 IN | SYSTOLIC BLOOD PRESSURE: 108 MMHG | WEIGHT: 133.8 LBS | BODY MASS INDEX: 21 KG/M2 | HEART RATE: 80 BPM | DIASTOLIC BLOOD PRESSURE: 66 MMHG | OXYGEN SATURATION: 98 %

## 2023-10-16 DIAGNOSIS — E78.5 DYSLIPIDEMIA: Chronic | ICD-10-CM

## 2023-10-16 DIAGNOSIS — I10 ESSENTIAL HYPERTENSION: Primary | Chronic | ICD-10-CM

## 2023-10-16 DIAGNOSIS — I25.10 ASCVD (ARTERIOSCLEROTIC CARDIOVASCULAR DISEASE): Chronic | ICD-10-CM

## 2023-10-16 PROBLEM — I21.3 STEMI (ST ELEVATION MYOCARDIAL INFARCTION): Status: RESOLVED | Noted: 2022-08-10 | Resolved: 2023-10-16

## 2023-10-16 PROBLEM — I21.3 ST ELEVATION MYOCARDIAL INFARCTION (STEMI), UNSPECIFIED ARTERY: Status: RESOLVED | Noted: 2022-08-09 | Resolved: 2023-10-16

## 2023-10-16 PROCEDURE — 99214 OFFICE O/P EST MOD 30 MIN: CPT | Performed by: NURSE PRACTITIONER

## 2023-10-16 PROCEDURE — 3074F SYST BP LT 130 MM HG: CPT | Performed by: NURSE PRACTITIONER

## 2023-10-16 PROCEDURE — 1160F RVW MEDS BY RX/DR IN RCRD: CPT | Performed by: NURSE PRACTITIONER

## 2023-10-16 PROCEDURE — 3078F DIAST BP <80 MM HG: CPT | Performed by: NURSE PRACTITIONER

## 2023-10-16 PROCEDURE — 1159F MED LIST DOCD IN RCRD: CPT | Performed by: NURSE PRACTITIONER

## 2023-10-16 RX ORDER — ROSUVASTATIN CALCIUM 10 MG/1
10 TABLET, COATED ORAL NIGHTLY
Qty: 90 TABLET | Refills: 3 | Status: SHIPPED | OUTPATIENT
Start: 2023-10-16

## 2023-10-16 RX ORDER — ISOSORBIDE DINITRATE 30 MG/1
30 TABLET ORAL 2 TIMES DAILY
Qty: 180 TABLET | Refills: 3 | Status: SHIPPED | OUTPATIENT
Start: 2023-10-16

## 2023-10-16 RX ORDER — AMLODIPINE BESYLATE 5 MG/1
5 TABLET ORAL NIGHTLY
Qty: 90 TABLET | Refills: 3 | Status: SHIPPED | OUTPATIENT
Start: 2023-10-16

## 2023-10-16 RX ORDER — HYDROCHLOROTHIAZIDE 12.5 MG/1
12.5 CAPSULE, GELATIN COATED ORAL DAILY
Qty: 90 CAPSULE | Refills: 3 | Status: SHIPPED | OUTPATIENT
Start: 2023-10-16

## 2023-10-16 RX ORDER — METOPROLOL SUCCINATE 25 MG/1
25 TABLET, EXTENDED RELEASE ORAL DAILY
Qty: 50 TABLET | Refills: 3 | Status: SHIPPED | OUTPATIENT
Start: 2023-10-16

## 2023-10-16 RX ORDER — RANOLAZINE 500 MG/1
500 TABLET, EXTENDED RELEASE ORAL 2 TIMES DAILY
Qty: 90 TABLET | Refills: 3 | Status: SHIPPED | OUTPATIENT
Start: 2023-10-16

## 2023-10-16 NOTE — PROGRESS NOTES
"Chief Complaint  Follow-up (Pt denies CP, SOA on exertion, Leg/ankle swelling,chronic fatigue) and Med Review (Tolerating all current medications.)    Subjective          Tila Cleveland presents to [facility] CARDIOLOGY for follow up.    History of Present Illness  Patient presents for routine follow-up of ASCVD, hypertension, and hyperlipidemia.  She was last seen in clinic 07/21/2023 by Dr. Julien.  At that time no changes were made to her medications.    She reports that she is doing well.  She denies chest pain, shortness of breath on exertion, or increase in her baseline lower extremity edema.  She has not been wearing compression stockings.  Tobacco Use: High Risk (10/17/2023)    Patient History     Smoking Tobacco Use: Former     Smokeless Tobacco Use: Current     Passive Exposure: Not on file       Objective     Vital Signs:   /66   Pulse 80   Ht 170.2 cm (67\")   Wt 60.7 kg (133 lb 12.8 oz)   SpO2 98%   BMI 20.96 kg/m²       Physical Exam  Vitals and nursing note reviewed. Exam conducted with a chaperone present.   Constitutional:       Appearance: She is ill-appearing (Chronically).   HENT:      Head: Normocephalic and atraumatic.   Cardiovascular:      Rate and Rhythm: Normal rate and regular rhythm.   Pulmonary:      Effort: Pulmonary effort is normal.      Breath sounds: Normal breath sounds.   Musculoskeletal:         General: No swelling.   Skin:     Coloration: Skin is pale.   Neurological:      Mental Status: She is alert. Mental status is at baseline.   Psychiatric:         Mood and Affect: Mood normal.          Result Review :   The following data was reviewed by: YAMILET Doran on 10/16/2023:  Common labs          9/15/2023    13:27   Common Labs   Creatinine 0.90    Labs requested from [facility] taken 10/13/2023 reveal a total cholesterol of 117, glycerides 176, HDL of 56, LDL of 33  Data reviewed : Cardiology studies as detailed below  "     Last Cardiac Cath  Results for orders placed during the hospital encounter of 08/10/22    Cardiac Catheterization/Vascular Study    Narrative  Table formatting from the original result was not included.  Images from the original result were not included.  [facility]  [address], [address]  [place], [place], [address] [phone]  [email or web address] HospitalDream[facility]          CARDIAC CATHETERIZATION PROCEDURE NOTE    Impression  · The 90% heavily calcified proximal RCA stenosis is status post rotational atherectomy, cutting balloon angioplasty, and stenting with a Synergy 3.5 x 28 mm drug-eluting stent.  Proximal optimization up to 4.5 mm.  · The 70% calcified mid RCA stenosis is status post cutting balloon angioplasty and stenting with a Synergy 3.0 x 12 mm drug-eluting stent and postdilatation to 3.25 mm    RECOMMENDATIONS:  · Clinical monitoring and telemetry  · Echocardiogram, EKG, labs as ordered  · DAPT, statin, and additional CAD medications as needed/tolerated  · Lifestyle and risk factor modifications for CAD as able    ----------------------------------------------------------------------------------------------------------------------    Indication(s) for this Procedure:  Acute chest pain, found to have inferior STEMI upon presentation at [facility] yesterday, underwent emergent heart cath with balloon angioplasty only restoring CROW-3 flow to the RCA.  Further intervention was not performed due to a non-dilatable heavily calcified proximal segment.  Patient transferred to [facility] for staged intervention with atherectomy.    Procedure(s) Performed:  1. Right femoral artery access  2. Right femoral venous  3. Temporary pacemaker wire placement in the right ventricle  4. Selective coronary angiography  5. Rotational atherectomy, cutting balloon angioplasty, intravascular ultrasound, and stenting of the RCA    Description of the Procedure:  Informed consent  was obtained with the goals, rationale, alternatives, risks and benefits of the procedure explained to the patient.    A 7Fr [place] sheath was placed in the right femoral artery with ultrasound guidance.  A 5 Fijian [place] sheath was placed in the right femoral vein with ultrasound guidance.  A temporary pacemaker wire was placed in the right ventricle in the standard fashion.  Heparin was given for anticoagulation.  A 7 Fijian AL 0.75 guide catheter was inserted.  A Rota drive guidewire within a [place] microcatheter was used to cross the proximal mid segment stenoses.  The microcatheter was removed.  Atherectomy was performed with a 1.5 mm Rota Pro kerri with multiple passes.  A Jovan Blue guidewire was used to cross the lesions.  A 3.0 mm cutting balloon was used to cross the proximal segment lesions and inflated multiple times.  Intravascular ultrasound was performed.  A Synergy 3.5 x 28 mm drug-eluting stent was inserted in the proximal segment.  The stent was postdilated with a 4.0 mm noncompliant balloon.  A guide liner was inserted.  The mid segment stenosis was predilated with a 3.0 mm cutting balloon.  A Synergy 3.0 x 12 mm drug-eluting stent was placed.  The stent was postdilated with a 3.25 mm noncompliant balloon.  Repeat intravascular ultrasound revealed an underexpanded proximal segment of the proximal segment stent.  The stent was postdilated further with a 4.5 mm noncompliant balloon.    The procedure was completed and the arterial sheath was removed.  An AngioSeal VIP was placed.  The venous sheath was sutured in place.    Angiographic Findings:  Please see the complete diagnostic angiogram report from August 9, 2022, from [facility], for complete angiographic details.  Below represents today's findings prior to intervention.    Right dominant coronary circulation  · Right coronary artery: The vessel is large sized and bifurcates into an RPL S and RPDA.  There is a heavily calcified, 90%  proximal segment stenosis with what appears to be some residual thrombus.  There is a 70% calcified mid segment stenosis.  The RPL S is medium sized and without significant disease.  The RPDA is medium sized and without significant disease.    Intravascular imaging:   Intravascular ultrasound was performed to size a vessel for stenting.  Repeat intravascular ultrasound after initial intervention revealed an underexpanded proximal segment of the proximally placed stent.  This segment was further postdilated    Hemodynamic Findings:  · Ao pressure:  145/85 mmHg    Post-interventional Results:  Lesion #1  · ACC AHA class lesion: Type C  · Location:    Proximal and mid RCA  · Stenosis pre-PCI:  90% heavily calcified proximal segment stenosis, 70% calcified mid segment stenosis %  · Stenosis post-PCI:  0%  · CROW flow pre-PCI:  2.5  · CROW flow post-PCI:  3    Estimated Blood Loss:  Minimal    Specimen(s):  None obtained    Sheath:  Arterial sheath removed, AngioSeal VIP.  Venous sheath sutured in place.    Complications:  There were no apparent early complications.    Diego Curtis MD, MSc, FACC, [facility]  Interventional Cardiology  [facility]           Last Echo  Results for orders placed during the hospital encounter of 08/10/22    Adult Transthoracic Echo Complete W/ Cont if Necessary Per Protocol    Interpretation Summary  · Left ventricular ejection fraction appears to be 56 - 60%. Left ventricular systolic function is normal.  · The following left ventricular wall segments are hypokinetic: basal inferolateral and basal inferior.  · Left ventricular diastolic function is consistent with (grade Ia w/high LAP) impaired relaxation.             Current Outpatient Medications   Medication Sig Dispense Refill    ALPRAZolam (XANAX) 1 MG tablet Take 0.5 tablets by mouth Every Night. 0.5  IN THE MORNING AND 0.5 AT NIGHT      amLODIPine (NORVASC) 5 MG tablet Take 1 tablet by mouth Every Night. 90  tablet 3    aspirin 81 MG EC tablet Take 1 tablet by mouth Daily. 90 tablet 3    dicyclomine (Bentyl) 10 MG capsule Take 1 capsule by mouth 4 (Four) Times a Day Before Meals & at Bedtime. (Patient taking differently: Take 1 capsule by mouth 2 (Two) Times a Day.) 120 capsule 0    hydroCHLOROthiazide (MICROZIDE) 12.5 MG capsule Take 1 capsule by mouth Daily. 90 capsule 3    HYDROcodone-acetaminophen (NORCO) 7.5-325 MG per tablet Take 1 tablet by mouth Every 8 (Eight) Hours As Needed for Moderate Pain.      isosorbide dinitrate (ISORDIL) 30 MG tablet Take 1 tablet by mouth 2 (Two) Times a Day. 180 tablet 3    levothyroxine (SYNTHROID, LEVOTHROID) 75 MCG tablet Take 1 tablet by mouth Daily. 30 tablet 0    metoprolol succinate XL (TOPROL-XL) 25 MG 24 hr tablet Take 1 tablet by mouth Daily. 50 tablet 3    ranolazine (Ranexa) 500 MG 12 hr tablet Take 1 tablet by mouth 2 (Two) Times a Day. 90 tablet 3    rosuvastatin (CRESTOR) 10 MG tablet Take 1 tablet by mouth Every Night. 90 tablet 3    ticagrelor (Brilinta) 60 MG tablet tablet Take 1 tablet by mouth 2 (Two) Times a Day. 180 tablet 3    vitamin D3 125 MCG (5000 UT) capsule capsule Take 1 capsule by mouth Daily.       No current facility-administered medications for this visit.            Assessment and Plan    Problem List Items Addressed This Visit       ASCVD (arteriosclerotic cardiovascular disease) (Chronic)    Overview     8/9/2022 [facility] for STEMI: POBA to the RCA, LAD is diffusely calcified, diagonal with 70 to 80% stenosis, bifurcation lesion involving the ostium of the diagonal 1 also had 2 tandem 70 to 80% stenosis in the proximal portion, LCx with mild luminal irregularities, OM had a proximal long tubular 40 to 50% stenosis  8/10/2022 [facility]: Rotational atherectomy, Cutting Balloon angioplasty, intravascular ultrasound and stenting of the RCA  8/11/2022 [facility]: LVEF 56 to 60%, hypokinesia of the basal inferior lateral and basal inferior wall, grade 1a diastolic  dysfunction with high LAP, aortic valve sclerosis and trace MR         Relevant Medications    isosorbide dinitrate (ISORDIL) 30 MG tablet    metoprolol succinate XL (TOPROL-XL) 25 MG 24 hr tablet    ranolazine (Ranexa) 500 MG 12 hr tablet    ticagrelor (Brilinta) 60 MG tablet tablet    Essential hypertension - Primary (Chronic)    Relevant Medications    amLODIPine (NORVASC) 5 MG tablet    hydroCHLOROthiazide (MICROZIDE) 12.5 MG capsule    metoprolol succinate XL (TOPROL-XL) 25 MG 24 hr tablet    Dyslipidemia (Chronic)    Overview     Total cholesterol 139, triglycerides 200, HDL 41, and LDL 65  10/13/2023 total cholesterol 117, triglycerides 176, HDL 56, LDL 33         Relevant Medications    rosuvastatin (CRESTOR) 10 MG tablet     Diagnoses and all orders for this visit:    1. Essential hypertension (Primary)  -     amLODIPine (NORVASC) 5 MG tablet; Take 1 tablet by mouth Every Night.  Dispense: 90 tablet; Refill: 3  -     hydroCHLOROthiazide (MICROZIDE) 12.5 MG capsule; Take 1 capsule by mouth Daily.  Dispense: 90 capsule; Refill: 3    2. ASCVD (arteriosclerotic cardiovascular disease)  -     isosorbide dinitrate (ISORDIL) 30 MG tablet; Take 1 tablet by mouth 2 (Two) Times a Day.  Dispense: 180 tablet; Refill: 3  -     metoprolol succinate XL (TOPROL-XL) 25 MG 24 hr tablet; Take 1 tablet by mouth Daily.  Dispense: 50 tablet; Refill: 3  -     ranolazine (Ranexa) 500 MG 12 hr tablet; Take 1 tablet by mouth 2 (Two) Times a Day.  Dispense: 90 tablet; Refill: 3  -     ticagrelor (Brilinta) 60 MG tablet tablet; Take 1 tablet by mouth 2 (Two) Times a Day.  Dispense: 180 tablet; Refill: 3    3. Dyslipidemia  -     rosuvastatin (CRESTOR) 10 MG tablet; Take 1 tablet by mouth Every Night.  Dispense: 90 tablet; Refill: 3      Continue current medications at current doses.  Recommend compression stockings and bilateral lower extremity elevation above the level of the heart to alleviate edema.      Follow Up     Return in  about 6 months (around 4/16/2024) for Next scheduled follow up.    Patient was given instructions and counseling regarding her condition or for health maintenance advice. Please see specific information pulled into the AVS if appropriate.       Electronically signed by YAMILET Doran, 10/17/23, 4:31 PM EDT.      Dictated Utilizing Dragon Dictation: Part of this note may be an electronic transcription/translation of spoken language to printed text using the Dragon Dictation System

## 2023-10-31 NOTE — PROGRESS NOTES
[facility] Cardiothoracic Surgery Office Follow Up Note     Date of Encounter: 2023     Name: Tila Cleveland  : 1940     Referred By: No ref. provider found  PCP: Jodi Guthrie APRN    Chief Complaint:    Chief Complaint   Patient presents with    Follow-up     1 YR FU with CT Chest for Left Lung Nodule       Subjective      History of Present Illness:    Tila Cleveland is a 83 y.o. female former smoker followed by Dr. Sainz for lung nodules.  PMH: HTN, CAD s/p PCI, HTN, thyroid disease, hyperlipidemia, and left lower lobe pulmonary nodule.  Patient last seen via telemedicine on 2022 with stable CTA chest demonstrating partially calcified nodule favoring granuloma or hamartoma 1.3 cm.  She returns to clinic today for review of surveillance 1 year CT chest.  Patient complains of nonproductive intermittent cough which she relates to dysphagia but no hemoptysis, unintentional weight loss, fever/chills, or lymphadenopathy.    Review of Systems:  Review of Systems   Constitutional: Positive for malaise/fatigue. Negative for chills, decreased appetite, diaphoresis, fever, night sweats, weight gain and weight loss.   HENT:  Negative for hoarse voice.    Eyes:  Negative for blurred vision, double vision and visual disturbance.   Cardiovascular:  Positive for chest pain (in the mornings when she is still lying down), dyspnea on exertion, leg swelling (right leg) and palpitations. Negative for claudication, irregular heartbeat, near-syncope, orthopnea, paroxysmal nocturnal dyspnea and syncope.   Respiratory:  Positive for wheezing. Negative for cough, hemoptysis, shortness of breath and sputum production.    Hematologic/Lymphatic: Negative for adenopathy and bleeding problem. Bruises/bleeds easily.   Skin:  Negative for color change, nail changes, poor wound healing and rash.   Musculoskeletal:  Positive for back pain. Negative for falls and muscle cramps.   Gastrointestinal:  Positive for  dysphagia. Negative for abdominal pain and heartburn.   Genitourinary:  Negative for flank pain.   Neurological:  Negative for brief paralysis, disturbances in coordination, dizziness, focal weakness, headaches, light-headedness, loss of balance, numbness, paresthesias, sensory change, vertigo and weakness.   Psychiatric/Behavioral:  Negative for depression and suicidal ideas.    Allergic/Immunologic: Negative for persistent infections.       I have reviewed the following portions of the patient's history: problem list, current medications, allergies, past surgical history, past medical history, past social history, past family history, and ROS and confirm it's accurate.    Allergies:  Allergies   Allergen Reactions    Quinolones Other (See Comments)     Can not recall reaction but she had to go to the hospital.    Penicillins Rash       Medications:      Current Outpatient Medications:     ALPRAZolam (XANAX) 1 MG tablet, Take 0.5 tablets by mouth Every Night. 0.5  IN THE MORNING AND 0.5 AT NIGHT, Disp: , Rfl:     amLODIPine (NORVASC) 5 MG tablet, Take 1 tablet by mouth Every Night., Disp: 90 tablet, Rfl: 3    aspirin 81 MG EC tablet, Take 1 tablet by mouth Daily., Disp: 90 tablet, Rfl: 3    dicyclomine (Bentyl) 10 MG capsule, Take 1 capsule by mouth 4 (Four) Times a Day Before Meals & at Bedtime. (Patient taking differently: Take 1 capsule by mouth 2 (Two) Times a Day.), Disp: 120 capsule, Rfl: 0    hydroCHLOROthiazide (MICROZIDE) 12.5 MG capsule, Take 1 capsule by mouth Daily., Disp: 90 capsule, Rfl: 3    HYDROcodone-acetaminophen (NORCO) 7.5-325 MG per tablet, Take 1 tablet by mouth Every 8 (Eight) Hours As Needed for Moderate Pain., Disp: , Rfl:     isosorbide dinitrate (ISORDIL) 30 MG tablet, Take 1 tablet by mouth 2 (Two) Times a Day. (Patient taking differently: Take 2 tablets by mouth 2 (Two) Times a Day.), Disp: 180 tablet, Rfl: 3    levothyroxine (SYNTHROID, LEVOTHROID) 75 MCG tablet, Take 1 tablet by  mouth Daily., Disp: 30 tablet, Rfl: 0    metoprolol succinate XL (TOPROL-XL) 25 MG 24 hr tablet, Take 1 tablet by mouth Daily., Disp: 50 tablet, Rfl: 3    ranolazine (Ranexa) 500 MG 12 hr tablet, Take 1 tablet by mouth 2 (Two) Times a Day., Disp: 90 tablet, Rfl: 3    rosuvastatin (CRESTOR) 10 MG tablet, Take 1 tablet by mouth Every Night., Disp: 90 tablet, Rfl: 3    ticagrelor (Brilinta) 60 MG tablet tablet, Take 1 tablet by mouth 2 (Two) Times a Day., Disp: 180 tablet, Rfl: 3    vitamin D3 125 MCG (5000 UT) capsule capsule, Take 2,000 Units by mouth Daily., Disp: , Rfl:     History:   Past Medical History:   Diagnosis Date    Coronary artery disease     Disease of thyroid gland     Hypertension     Hypothyroidism     Lung nodule     ST elevation myocardial infarction (STEMI), unspecified artery        Past Surgical History:   Procedure Laterality Date    CARDIAC CATHETERIZATION N/A 2022    Procedure: Left Heart Cath;  Surgeon: Allen Jimenes MD;  Location:  [facility];  Service: Cardiovascular;  Laterality: N/A;    CARDIAC CATHETERIZATION N/A 2022    Procedure: Percutaneous Coronary Intervention;  Surgeon: Allen Jimenes MD;  Location:  [facility];  Service: Cardiovascular;  Laterality: N/A;    CARDIAC CATHETERIZATION N/A 08/10/2022    Procedure: PERCUTANEOUS CORONARY INTERVENTION of RCA with possible rota;  Surgeon: Diego Curtis MD;  Location:  [facility];  Service: Cardiology;  Laterality: N/A;    CORONARY STENT PLACEMENT      THYROIDECTOMY, PARTIAL         Social History     Socioeconomic History    Marital status:     Number of children: 3   Tobacco Use    Smoking status: Former     Packs/day: 1.00     Years: 20.00     Additional pack years: 0.00     Total pack years: 20.00     Types: Cigarettes     Quit date:      Years since quittin.8    Smokeless tobacco: Current     Types: Snuff   Vaping Use     "Vaping Use: Never used   Substance and Sexual Activity    Alcohol use: Yes     Comment: OCC.    Drug use: No    Sexual activity: Defer        Family History   Problem Relation Age of Onset    Diabetes Mother     Stroke Mother     Lung cancer Father        Objective   Physical Exam:  Vitals:    11/01/23 1129 11/01/23 1130   BP: 120/60 118/50   BP Location: Left arm Right arm   Patient Position: Sitting Sitting   Pulse: 78    Temp: 97.1 °F (36.2 °C)    SpO2: 95%    Weight: 61.2 kg (135 lb)    Height: 165.1 cm (65\")  Comment: patient reported       Body mass index is 22.47 kg/m².    Physical Exam  Vitals reviewed.   Constitutional:       General: She is not in acute distress.     Appearance: She is not toxic-appearing.      Comments: Frail, elderly female.  Ambulates with rolling walker   HENT:      Head: Normocephalic and atraumatic.   Eyes:      General: Lids are normal.      Conjunctiva/sclera: Conjunctivae normal.      Pupils: Pupils are equal, round, and reactive to light.   Cardiovascular:      Rate and Rhythm: Normal rate and regular rhythm.      Pulses:           Dorsalis pedis pulses are 1+ on the right side and 1+ on the left side.        Posterior tibial pulses are 1+ on the right side and 1+ on the left side.      Heart sounds: S1 normal and S2 normal. No murmur heard.  Pulmonary:      Effort: Pulmonary effort is normal. No respiratory distress.      Breath sounds: Normal breath sounds.   Musculoskeletal:         General: Normal range of motion.      Cervical back: Normal range of motion and neck supple.      Right lower leg: No edema.      Left lower leg: No edema.   Lymphadenopathy:      Cervical: No cervical adenopathy.      Upper Body:      Right upper body: No supraclavicular or axillary adenopathy.      Left upper body: No supraclavicular or axillary adenopathy.   Skin:     General: Skin is warm and dry.      Capillary Refill: Capillary refill takes less than 2 seconds.   Neurological:      General: " No focal deficit present.      Mental Status: She is alert and oriented to person, place, and time.   Psychiatric:         Attention and Perception: Attention normal.         Mood and Affect: Mood normal.         Speech: Speech normal.         Behavior: Behavior normal. Behavior is cooperative.         Imaging/Labs:  CT Chest Without and With Intravenous Contrast: 9/15/2023  (Personally reviewed and agree w/ Radiologist assessment)  IMPRESSION:  1.  Partially calcified left lower lobe 1.3 cm pulmonary nodule is  stable.  2.  Coronary artery calcifications.  3.  Degenerative changes thoracic spine as described.   This report was finalized on 9/15/2023 1:50 PM by Dr. Robert Pro MD.    CT Chest w/wo contrast 4/1/22: (Personally reviewed and compared to film 9/15/23)  IMPRESSION:  1.  Stable chest CT demonstrating no change in a partially calcified  nodule left lower lobe that is again 13 mm in diameter.  2.  Other nonacute/incidental findings as above appear stable from  Previous.     CT Chest without contrast   IMPRESSION:  1.  1.3 cm parenchymal nodule in the left lung is stable.  2.  No new parenchymal nodules or masses.  3.  Coronary artery calcifications.      CT Chest without contrast 3/25/21:   CT FINDINGS: The lungs continue to be generally hyperinflated. There are  numerous granulomas in the lungs. There continues to be a solid lung  nodule in the left lower lobe. The lung nodule on today's study measures  12.8 mm, slightly larger than on the earlier exam. There are granulomas  and other small nodules in the lungs as well. There were no inflammatory  infiltrates or pleural effusions. No enlarged lymph nodes are identified  in the mediastinum or hilar areas. The heart was not enlarged, there  were no pericardial effusions. There is a small hiatal hernia.  IMPRESSION:  Slight interval increase in the lung nodule in the left  lower lobe by approximately 0.8 mm. There continue to be changes of  interstitial  lung disease throughout both lungs with hyperinflation. A  small hiatal hernia in the stomach was noted in the lower mediastinum.    Assessment / Plan      Assessment / Plan:  1. Left lower lobe pulmonary nodule (Primary)  - 83 y.o. female former smoker followed by Dr. Sainz for lung nodules.    - PMH: HTN, CAD s/p PCI, HTN, thyroid disease, hyperlipidemia, and left lower lobe pulmonary nodule.    - Annual CT chest reviewed w/ patient and family today: stable partially calcified nodule favoring granuloma or hamartoma 1.3 cm.    - Patient complains of nonproductive intermittent cough which she relates to dysphagia but no hemoptysis, unintentional weight loss, fever/chills, or lymphadenopathy.  - Follows with Cardiology, Dr. Julien, for CAD  - Will plan periodic surveillance w/ CT chest again in 12 months  - Advised to report if any constitutional symptoms develop.      Follow Up:   Return in about 1 year (around 11/1/2024) for CT chest.   Or sooner for any further concerns or worsening sign and symptoms. If unable to reach us in the office please dial 911 or go to the nearest emergency department.      YAMILET Urias  [facility] Cardiothoracic Surgery    Time Spent: I spent 25 minutes caring for Tila on this date of service. This time includes time spent by me in the following activities: preparing for the visit, reviewing tests, obtaining and/or reviewing a separately obtained history, performing a medically appropriate examination and/or evaluation, counseling and educating the patient/family/caregiver, ordering medications, tests, or procedures, documenting information in the medical record, independently interpreting results and communicating that information with the patient/family/caregiver, and care coordination.

## 2023-11-01 ENCOUNTER — OFFICE VISIT (OUTPATIENT)
Dept: CARDIAC SURGERY | Facility: CLINIC | Age: 83
End: 2023-11-01
Payer: MEDICARE

## 2023-11-01 VITALS
HEIGHT: 65 IN | TEMPERATURE: 97.1 F | OXYGEN SATURATION: 95 % | WEIGHT: 135 LBS | DIASTOLIC BLOOD PRESSURE: 50 MMHG | BODY MASS INDEX: 22.49 KG/M2 | SYSTOLIC BLOOD PRESSURE: 118 MMHG | HEART RATE: 78 BPM

## 2023-11-01 DIAGNOSIS — R91.1 LEFT LOWER LOBE PULMONARY NODULE: Primary | ICD-10-CM

## 2023-11-01 PROCEDURE — 3074F SYST BP LT 130 MM HG: CPT | Performed by: NURSE PRACTITIONER

## 2023-11-01 PROCEDURE — 99213 OFFICE O/P EST LOW 20 MIN: CPT | Performed by: NURSE PRACTITIONER

## 2023-11-01 PROCEDURE — 3078F DIAST BP <80 MM HG: CPT | Performed by: NURSE PRACTITIONER

## 2024-04-22 ENCOUNTER — TRANSCRIBE ORDERS (OUTPATIENT)
Dept: ADMINISTRATIVE | Facility: HOSPITAL | Age: 84
End: 2024-04-22
Payer: MEDICARE

## 2024-04-22 ENCOUNTER — LAB (OUTPATIENT)
Dept: LAB | Facility: HOSPITAL | Age: 84
End: 2024-04-22
Payer: MEDICARE

## 2024-04-22 DIAGNOSIS — R06.02 SHORTNESS OF BREATH: ICD-10-CM

## 2024-04-22 DIAGNOSIS — R60.0 LOCALIZED EDEMA: ICD-10-CM

## 2024-04-22 DIAGNOSIS — R60.0 LOCALIZED EDEMA: Primary | ICD-10-CM

## 2024-04-22 LAB — NT-PROBNP SERPL-MCNC: 698.2 PG/ML (ref 0–1800)

## 2024-04-22 PROCEDURE — 83880 ASSAY OF NATRIURETIC PEPTIDE: CPT

## 2024-04-22 PROCEDURE — 36415 COLL VENOUS BLD VENIPUNCTURE: CPT

## 2024-07-16 DIAGNOSIS — I25.10 ASCVD (ARTERIOSCLEROTIC CARDIOVASCULAR DISEASE): Chronic | ICD-10-CM

## 2024-07-17 RX ORDER — RANOLAZINE 500 MG/1
500 TABLET, EXTENDED RELEASE ORAL 2 TIMES DAILY
Qty: 90 TABLET | Refills: 3 | Status: SHIPPED | OUTPATIENT
Start: 2024-07-17

## 2024-09-24 DIAGNOSIS — R91.1 LEFT LOWER LOBE PULMONARY NODULE: Primary | ICD-10-CM

## 2024-11-15 ENCOUNTER — HOSPITAL ENCOUNTER (OUTPATIENT)
Dept: CT IMAGING | Facility: HOSPITAL | Age: 84
Discharge: HOME | End: 2024-11-15
Payer: MEDICARE

## 2024-11-15 DIAGNOSIS — R91.1 LEFT LOWER LOBE PULMONARY NODULE: ICD-10-CM

## 2024-11-15 LAB — CREAT BLDA-MCNC: 1 MG/DL (ref 0.6–1.3)

## 2024-11-15 PROCEDURE — 71270 CT THORAX DX C-/C+: CPT

## 2024-11-15 PROCEDURE — 82565 ASSAY OF CREATININE: CPT

## 2024-11-15 PROCEDURE — 25510000001 IOPAMIDOL 61 % SOLUTION: Performed by: NURSE PRACTITIONER

## 2024-11-15 RX ORDER — IOPAMIDOL 612 MG/ML
100 INJECTION, SOLUTION INTRAVASCULAR
Status: COMPLETED | OUTPATIENT
Start: 2024-11-15 | End: 2024-11-15

## 2024-11-15 RX ADMIN — IOPAMIDOL 100 ML: 612 INJECTION, SOLUTION INTRAVENOUS at 14:24

## 2025-03-18 NOTE — PROGRESS NOTES
[facility] Cardiothoracic Surgery Office Follow Up Note     Date of Encounter: 2025     Name: Tila Cleveland  : 1940     Referred By: No ref. provider found  PCP: Jodi Guthrie APRN    Chief Complaint:    Chief Complaint   Patient presents with    Follow-up     1 yr follow up with CT chest for lung nodule. PT states that she gets fatigued and SOA easily. Bilateral leg swelling, Dizzy from time to time.        Subjective      History of Present Illness:    Tila Cleveland is a 85 y.o. female former smoker followed by Dr. Sainz for lung nodules.  PMH: HTN, CAD s/p PCI, HTN, thyroid disease, diastolic heart failure, Hyperlipidemia, degenerative disc disease, and left lower lobe pulmonary nodule.  Patient last seen 2023 with stable CTA chest which demonstrated partially calcified nodule favoring granuloma or hamartoma 1.3 cm.  She returns to clinic today for review of surveillance 1 year CT chest.  Continues to follow with cardiology, Dr. Jasso.  Remote smoker.  Currently uses snuff.    Review of Systems:  Review of Systems   Constitutional: Positive for malaise/fatigue. Negative for chills, fever, night sweats and weight loss.   HENT:  Positive for congestion. Negative for hearing loss, nosebleeds and odynophagia.    Cardiovascular:  Positive for dyspnea on exertion and leg swelling (bilateral). Negative for chest pain, claudication, orthopnea, palpitations and syncope.   Respiratory:  Negative for cough, hemoptysis, shortness of breath and wheezing.    Endocrine: Negative for cold intolerance, heat intolerance, polydipsia, polyphagia and polyuria.   Hematologic/Lymphatic: Bruises/bleeds easily.   Skin:  Negative for itching, poor wound healing and rash.   Musculoskeletal:  Positive for arthritis and back pain (lower back pain). Negative for joint pain, joint swelling and myalgias.   Gastrointestinal:  Negative for abdominal pain, constipation, diarrhea, hematemesis, melena, nausea and  vomiting.   Genitourinary:  Negative for dysuria, frequency, hematuria, nocturia and urgency.   Neurological:  Positive for weakness (back problems). Negative for dizziness (with great exertion), light-headedness, loss of balance and numbness.   Psychiatric/Behavioral:  Negative for depression and suicidal ideas. The patient is not nervous/anxious.    Allergic/Immunologic: Positive for environmental allergies. Negative for HIV exposure.       I have reviewed the following portions of the patient's history: problem list, current medications, allergies, past surgical history, past medical history, past social history, past family history, and ROS and confirm it's accurate.    Allergies:  Allergies   Allergen Reactions    Quinolones Other (See Comments)     Can not recall reaction but she had to go to the hospital.    Penicillins Rash       Medications:      Current Outpatient Medications:     ALPRAZolam (XANAX) 1 MG tablet, Take 0.5 tablets by mouth Every Night. 0.5  IN THE MORNING AND 0.5 AT NIGHT, Disp: , Rfl:     aspirin 81 MG EC tablet, Take 1 tablet by mouth Daily., Disp: 90 tablet, Rfl: 3    dicyclomine (Bentyl) 10 MG capsule, Take 1 capsule by mouth 4 (Four) Times a Day Before Meals & at Bedtime. (Patient taking differently: Take 1 capsule by mouth 2 (Two) Times a Day.), Disp: 120 capsule, Rfl: 0    furosemide (LASIX) 20 MG tablet, Take 1 tablet by mouth Daily., Disp: , Rfl:     HYDROcodone-acetaminophen (NORCO) 7.5-325 MG per tablet, Take 1 tablet by mouth Every 8 (Eight) Hours As Needed for Moderate Pain., Disp: , Rfl:     levothyroxine (SYNTHROID, LEVOTHROID) 75 MCG tablet, Take 1 tablet by mouth Daily. (Patient taking differently: Take 88 mcg by mouth Daily.), Disp: 30 tablet, Rfl: 0    metoprolol succinate XL (TOPROL-XL) 25 MG 24 hr tablet, Take 1 tablet by mouth Daily. (Patient taking differently: Take 0.5 tablets by mouth Daily.), Disp: 50 tablet, Rfl: 3    ranolazine (RANEXA) 500 MG 12 hr tablet, TAKE 1  TABLET BY MOUTH TWICE DAILY, Disp: 90 tablet, Rfl: 3    rosuvastatin (CRESTOR) 10 MG tablet, Take 1 tablet by mouth Every Night., Disp: 90 tablet, Rfl: 3    vitamin D3 125 MCG (5000 UT) capsule capsule, Take 2,000 Units by mouth Daily., Disp: , Rfl:     amLODIPine (NORVASC) 5 MG tablet, Take 1 tablet by mouth Every Night. (Patient not taking: Reported on 3/19/2025), Disp: 90 tablet, Rfl: 3    hydroCHLOROthiazide (MICROZIDE) 12.5 MG capsule, Take 1 capsule by mouth Daily. (Patient not taking: Reported on 3/19/2025), Disp: 90 capsule, Rfl: 3    isosorbide dinitrate (ISORDIL) 30 MG tablet, Take 1 tablet by mouth 2 (Two) Times a Day. (Patient not taking: Reported on 3/19/2025), Disp: 180 tablet, Rfl: 3    ticagrelor (Brilinta) 60 MG tablet tablet, Take 1 tablet by mouth 2 (Two) Times a Day. (Patient not taking: Reported on 3/19/2025), Disp: 180 tablet, Rfl: 3    History:   Past Medical History:   Diagnosis Date    Coronary artery disease     Disease of thyroid gland     Hypertension     Hypothyroidism     Lung nodule     ST elevation myocardial infarction (STEMI), unspecified artery 8/9/2022       Past Surgical History:   Procedure Laterality Date    CARDIAC CATHETERIZATION N/A 08/09/2022    Procedure: Left Heart Cath;  Surgeon: Allen Jimenes MD;  Location: [facility] CATH INVASIVE LOCATION;  Service: Cardiovascular;  Laterality: N/A;    CARDIAC CATHETERIZATION N/A 08/09/2022    Procedure: Percutaneous Coronary Intervention;  Surgeon: Allen Jimenes MD;  Location: [facility] CATH INVASIVE LOCATION;  Service: Cardiovascular;  Laterality: N/A;    CARDIAC CATHETERIZATION N/A 08/10/2022    Procedure: PERCUTANEOUS CORONARY INTERVENTION of RCA with possible rota;  Surgeon: Diego Curtis MD;  Location:  [facility] CATH INVASIVE LOCATION;  Service: Cardiology;  Laterality: N/A;    CORONARY STENT PLACEMENT      THYROIDECTOMY, PARTIAL         Social History     Socioeconomic History    Marital status:      Number of children: 3   Tobacco Use    Smoking status: Former     Current packs/day: 0.00     Average packs/day: 1 pack/day for 20.0 years (20.0 ttl pk-yrs)     Types: Cigarettes     Start date:      Quit date:      Years since quittin.2    Smokeless tobacco: Current     Types: Snuff   Vaping Use    Vaping status: Never Used   Substance and Sexual Activity    Alcohol use: Yes     Comment: OCC.    Drug use: No    Sexual activity: Defer        Family History   Problem Relation Age of Onset    Diabetes Mother     Stroke Mother     Lung cancer Father        Objective   Physical Exam:  Vitals:    25 1022   BP: 116/62   BP Location: Right arm   Pulse: 83   Temp: 98.3 °F (36.8 °C)   SpO2: 96%   Weight: 64.4 kg (142 lb)      Body mass index is 23.63 kg/m².    Physical Exam  Vitals reviewed.   Constitutional:       General: She is not in acute distress.     Appearance: She is not toxic-appearing.   HENT:      Head: Normocephalic and atraumatic.   Eyes:      General: Lids are normal.      Conjunctiva/sclera: Conjunctivae normal.      Pupils: Pupils are equal, round, and reactive to light.   Neck:      Vascular: No carotid bruit.   Cardiovascular:      Rate and Rhythm: Normal rate and regular rhythm.      Heart sounds: S1 normal and S2 normal. No murmur heard.  Pulmonary:      Effort: Pulmonary effort is normal. No respiratory distress.      Breath sounds: No decreased breath sounds, wheezing, rhonchi or rales.   Musculoskeletal:         General: Normal range of motion.      Cervical back: Normal range of motion and neck supple.      Right lower leg: No edema.      Left lower leg: No edema.   Lymphadenopathy:      Cervical: No cervical adenopathy.      Upper Body:      Right upper body: No supraclavicular adenopathy.      Left upper body: No supraclavicular adenopathy.   Skin:     General: Skin is warm and dry.      Capillary Refill: Capillary refill takes less than 2 seconds.   Neurological:      General:  No focal deficit present.      Mental Status: She is alert and oriented to person, place, and time.   Psychiatric:         Attention and Perception: Attention normal.         Mood and Affect: Mood normal.         Speech: Speech normal.         Behavior: Behavior normal. Behavior is cooperative.         Imaging/Labs:  CT Chest Without and With Intravenous Contrast:11/15/2024 (personally reviewed and agree with radiologist assessment of calcified granulomatous lesion 1.3 cm left lower lobe and calcified left upper lobe granuloma 0.8 cm, and calcified right upper lobe nodule)   COMPARISON:  9/15/2023  IMPRESSION:  1.  No consolidative airspace disease identified.  2.  Calcified granuloma left lower lobe is stable, 12.4 mm.  3.  Linear opacities right lower lobe likely atelectasis.  4.  Large hiatal hernia.  5.  Stable mild cardiomegaly and severe coronary artery calcifications.    CT Chest Without and With Intravenous Contrast: 9/15/2023 (Personally reviewed and agree w/ Radiologist assessment)  IMPRESSION:  1.  Partially calcified left lower lobe 1.3 cm pulmonary nodule is  stable.  2.  Coronary artery calcifications.  3.  Degenerative changes thoracic spine as described.   This report was finalized on 9/15/2023 1:50 PM by Dr. Robert Pro MD.     CT Chest w/wo contrast 4/1/22:  IMPRESSION:  1.  Stable chest CT demonstrating no change in a partially calcified  nodule left lower lobe that is again 13 mm in diameter.  2.  Other nonacute/incidental findings as above appear stable from  Previous.     CT Chest without contrast 10/5/2021  IMPRESSION:  1.  1.3 cm parenchymal nodule in the left lung is stable.  2.  No new parenchymal nodules or masses.  3.  Coronary artery calcifications.      CT Chest without contrast 3/25/21:   IMPRESSION:  Slight interval increase in the lung nodule in the left  lower lobe by approximately 0.8 mm. There continue to be changes of interstitial lung disease throughout both lungs with  hyperinflation. A small hiatal hernia in the stomach was noted in the lower mediastinum.      Assessment / Plan      Assessment / Plan:  1. Left lower lobe pulmonary nodule  - 85 y.o. female former smoker followed by Dr. Sainz for lung nodules.    - PMH: HTN, CAD s/p PCI, HTN, thyroid disease, diastolic heart failure, Hyperlipidemia, degenerative disc disease, and left lower lobe pulmonary nodule.    - Patient last seen 11/1/2023 with stable CT chest which demonstrated partially calcified nodule favoring granuloma or hamartoma 1.3 cm.    - She returns to clinic today for review of surveillance 1 year CT chest: Imaging demonstrates stable calcified nodule 1.3 cm LLL, 0.8 cm calcified nodule left upper lobe and stable appearing calcified right upper lobe nodule.  - Continues to follow with cardiology, Dr. Jasso.   -Reports HOOD related to CHF but denies any chronic cough, hemoptysis, weight loss, or lymphadenopathy  - Remote smoker.  Currently uses snuff.  -Will plan one additional surveillance scan in 18 months.  Should this imaging be stable and continue to reflect calcified nodules consistent with granulomatous disease, patient will be released from chronic surveillance and report back to primary care.      Follow Up:   Return in about 18 months (around 9/19/2026) for CT Chest w/wo.   Or sooner for any further concerns or worsening sign and symptoms. If unable to reach us in the office please dial 911 or go to the nearest emergency department.      YAMILET Urias  [facility] Cardiothoracic Surgery    Time Spent: I spent 24 minutes caring for Tila on this date of service. This time includes time spent by me in the following activities: preparing for the visit, reviewing tests, obtaining and/or reviewing a separately obtained history, performing a medically appropriate examination and/or evaluation, counseling and educating the patient/family/caregiver, documenting information in the medical record,  independently interpreting results and communicating that information with the patient/family/caregiver, and care coordination.

## 2025-03-19 ENCOUNTER — OFFICE VISIT (OUTPATIENT)
Dept: CARDIAC SURGERY | Facility: CLINIC | Age: 85
End: 2025-03-19
Payer: MEDICARE

## 2025-03-19 VITALS
DIASTOLIC BLOOD PRESSURE: 62 MMHG | OXYGEN SATURATION: 96 % | HEART RATE: 83 BPM | WEIGHT: 142 LBS | TEMPERATURE: 98.3 F | SYSTOLIC BLOOD PRESSURE: 116 MMHG | BODY MASS INDEX: 23.63 KG/M2

## 2025-03-19 DIAGNOSIS — R91.1 LEFT LOWER LOBE PULMONARY NODULE: Primary | ICD-10-CM

## 2025-03-19 PROCEDURE — 1159F MED LIST DOCD IN RCRD: CPT | Performed by: NURSE PRACTITIONER

## 2025-03-19 PROCEDURE — 99213 OFFICE O/P EST LOW 20 MIN: CPT | Performed by: NURSE PRACTITIONER

## 2025-03-19 PROCEDURE — 1160F RVW MEDS BY RX/DR IN RCRD: CPT | Performed by: NURSE PRACTITIONER

## 2025-03-19 RX ORDER — FUROSEMIDE 20 MG/1
20 TABLET ORAL DAILY
COMMUNITY
Start: 2025-01-07

## 2025-07-23 DIAGNOSIS — E78.5 DYSLIPIDEMIA: Chronic | ICD-10-CM

## 2025-07-23 RX ORDER — ROSUVASTATIN CALCIUM 10 MG/1
10 TABLET, COATED ORAL NIGHTLY
Qty: 90 TABLET | Refills: 3 | OUTPATIENT
Start: 2025-07-23

## 2025-07-23 NOTE — TELEPHONE ENCOUNTER
Spoke to patient's grandson regarding need for patient to come in for visit as we have not seen her in over a year. He will call office back to set up appointment (he was on his way to work)

## (undated) DEVICE — MODEL AT P65, P/N 701554-001KIT CONTENTS: HAND CONTROLLER, 3-WAY HIGH-PRESSURE STOPCOCK WITH ROTATING END AND PREMIUM HIGH-PRESSURE TUBING: Brand: ANGIOTOUCH® KIT

## (undated) DEVICE — DRAPE, RADIAL, STERILE: Brand: MEDLINE

## (undated) DEVICE — HI-TORQUE WHISPER MS GUIDE WIRE .014 STRAIGHT TIP 3.0 CM X 190 CM: Brand: HI-TORQUE WHISPER

## (undated) DEVICE — MINI TREK CORONARY DILATATION CATHETER 2.0 MM X 15 MM / RAPID-EXCHANGE: Brand: MINI TREK

## (undated) DEVICE — RUNTHROUGH NS EXTRA FLOPPY PTCA GUIDEWIRE: Brand: RUNTHROUGH

## (undated) DEVICE — GW LUGE .014 182 CM

## (undated) DEVICE — CATH F5 INF PIG145 110CM 6SH: Brand: INFINITI

## (undated) DEVICE — PINNACLE INTRODUCER SHEATH: Brand: PINNACLE

## (undated) DEVICE — GUIDELINER CATHETERS ARE INTENDED TO BE USED IN CONJUNCTION WITH GUIDE CATHETERS TO ACCESS DISCRETE REGIONS OF THE CORONARY AND/OR PERIPHERAL VASCULATURE, AND TO FACILITATE PLACEMENT OF INTERVENTIONAL DEVICES.: Brand: GUIDELINER® V3 CATHETER

## (undated) DEVICE — GLIDESHEATH SLENDER STAINLESS STEEL KIT: Brand: GLIDESHEATH SLENDER

## (undated) DEVICE — CORONARY IMAGING CATHETER: Brand: OPTICROSS™ 6 HD

## (undated) DEVICE — Device: Brand: ASAHI SION BLUE

## (undated) DEVICE — SLV REPOSTNG CATH STRL 60CM

## (undated) DEVICE — ST ACC MICROPUNCTURE .018 TRANSLSS/SS/TP 5F/10CM 21G/7CM

## (undated) DEVICE — NC TREK CORONARY DILATATION CATHETER 4.5 MM X 8 MM / RAPID-EXCHANGE: Brand: NC TREK

## (undated) DEVICE — MICROCATHETER: Brand: MAMBA™ FLEX

## (undated) DEVICE — ANGIO-SEAL EVOLUTION VASCULAR CLOSURE DEVICE: Brand: ANGIO-SEAL

## (undated) DEVICE — TR BAND RADIAL ARTERY COMPRESSION DEVICE: Brand: TR BAND

## (undated) DEVICE — MICROSURGICAL DILATATION DEVICE: Brand: WOLVERINE CORONARY CUTTING BALLOON

## (undated) DEVICE — RUNWAY RADL W/TOP PAD

## (undated) DEVICE — NC TREK CORONARY DILATATION CATHETER 4.0 MM X 12 MM / RAPID-EXCHANGE: Brand: NC TREK

## (undated) DEVICE — ADULT, W/LG. BACK PAD, RADIOTRANSPARENT ELEMENT AND LEAD WIRE: Brand: DEFIBRILLATION ELECTRODES

## (undated) DEVICE — 8 FOOT DISPOSABLE BI-VENTRICULAR PACING CABLE WITH SAFE CONNECT / ALLIGATOR CLIP

## (undated) DEVICE — GW ATHRCTMY ROTAWIRE DRV FLOP W/WIRECLIP/TORQ FLX 330CM

## (undated) DEVICE — DEV INFL MONARCH 25W

## (undated) DEVICE — 6F .070 JR 4 100CM: Brand: CORDIS

## (undated) DEVICE — TREK CORONARY DILATATION CATHETER 3.0 MM X 8 MM / RAPID-EXCHANGE: Brand: TREK

## (undated) DEVICE — PK CATH CARD 70

## (undated) DEVICE — TREK CORONARY DILATATION CATHETER 2.50 MM X 12 MM / RAPID-EXCHANGE: Brand: TREK

## (undated) DEVICE — LN FLTR ORL/NASL MICROSTREAM NONINTUB A/LNG

## (undated) DEVICE — ST INF PRI SMRTSTE 20DRP 2VLV 24ML 117

## (undated) DEVICE — COPILOT BLEEDBACK CONTROL VALVE: Brand: COPILOT

## (undated) DEVICE — CVR PROB ULTRASND/TRANSD W/GEL 7X11IN STRL

## (undated) DEVICE — CATH PACE PACEL BIPOL 5F

## (undated) DEVICE — ST EXT IV SMARTSITE 2VLV SP M LL 5ML IV1

## (undated) DEVICE — PRE-CONNECTED EXCHANGEABLE BURR CATHETER AND BURR ADVANCING DEVICE: Brand: ROTAPRO™

## (undated) DEVICE — RADIFOCUS OPTITORQUE ANGIOGRAPHIC CATHETER: Brand: OPTITORQUE

## (undated) DEVICE — TREK CORONARY DILATATION CATHETER 3.0 MM X 15 MM / RAPID-EXCHANGE: Brand: TREK

## (undated) DEVICE — A2000 MULTI-USE SYRINGE KIT, P/N 701277-003KIT CONTENTS: 100ML CONTRAST RESERVOIR AND TUBING WITH CONTRAST SPIKE AND CLAMP: Brand: A2000 MULTI-USE SYRINGE KIT

## (undated) DEVICE — GW INQW FIX/CORE PTFE J/3MM .035 260CM

## (undated) DEVICE — TREK CORONARY DILATATION CATHETER 3.0 MM X 12 MM / RAPID-EXCHANGE: Brand: TREK

## (undated) DEVICE — PAD, DEFIB, ADULT, RADIOTRANS, ZOLL: Brand: MEDLINE

## (undated) DEVICE — DRSNG SURESITE WNDW 4X4.5

## (undated) DEVICE — GUIDE CATHETER: Brand: MACH1™